# Patient Record
Sex: FEMALE | Race: WHITE | Employment: OTHER | URBAN - METROPOLITAN AREA
[De-identification: names, ages, dates, MRNs, and addresses within clinical notes are randomized per-mention and may not be internally consistent; named-entity substitution may affect disease eponyms.]

---

## 2019-01-01 ENCOUNTER — APPOINTMENT (OUTPATIENT)
Dept: GENERAL RADIOLOGY | Age: 59
DRG: 025 | End: 2019-01-01
Payer: COMMERCIAL

## 2019-01-01 ENCOUNTER — HOSPITAL ENCOUNTER (INPATIENT)
Age: 59
LOS: 11 days | DRG: 025 | End: 2019-04-22
Attending: EMERGENCY MEDICINE | Admitting: SURGERY
Payer: COMMERCIAL

## 2019-01-01 ENCOUNTER — APPOINTMENT (OUTPATIENT)
Dept: GENERAL RADIOLOGY | Age: 59
End: 2019-01-01
Payer: COMMERCIAL

## 2019-01-01 ENCOUNTER — HOSPITAL ENCOUNTER (INPATIENT)
Age: 59
LOS: 2 days | Discharge: PSYCHIATRIC OR SUBSTANCE USE FACILITY | DRG: 084 | End: 2019-04-06
Attending: EMERGENCY MEDICINE | Admitting: SURGERY
Payer: COMMERCIAL

## 2019-01-01 ENCOUNTER — APPOINTMENT (OUTPATIENT)
Dept: CT IMAGING | Age: 59
DRG: 025 | End: 2019-01-01
Payer: COMMERCIAL

## 2019-01-01 ENCOUNTER — HOSPITAL ENCOUNTER (OUTPATIENT)
Age: 59
Discharge: HOME | End: 2019-04-03
Payer: COMMERCIAL

## 2019-01-01 ENCOUNTER — APPOINTMENT (OUTPATIENT)
Dept: CT IMAGING | Age: 59
DRG: 084 | End: 2019-01-01
Payer: COMMERCIAL

## 2019-01-01 ENCOUNTER — HOSPITAL ENCOUNTER (INPATIENT)
Age: 59
LOS: 4 days | Discharge: HOME | DRG: 881 | End: 2019-04-10
Attending: PSYCHIATRY & NEUROLOGY | Admitting: PSYCHIATRY & NEUROLOGY
Payer: COMMERCIAL

## 2019-01-01 ENCOUNTER — APPOINTMENT (OUTPATIENT)
Dept: GENERAL RADIOLOGY | Age: 59
DRG: 084 | End: 2019-01-01
Payer: COMMERCIAL

## 2019-01-01 ENCOUNTER — APPOINTMENT (OUTPATIENT)
Dept: CT IMAGING | Age: 59
End: 2019-01-01
Payer: COMMERCIAL

## 2019-01-01 ENCOUNTER — HOSPITAL ENCOUNTER (EMERGENCY)
Age: 59
Discharge: ANOTHER HOSPITAL | End: 2019-04-04
Attending: EMERGENCY MEDICINE
Payer: COMMERCIAL

## 2019-01-01 ENCOUNTER — ANESTHESIA (OUTPATIENT)
Dept: OPERATING ROOM | Age: 59
DRG: 025 | End: 2019-01-01
Payer: COMMERCIAL

## 2019-01-01 ENCOUNTER — ANESTHESIA EVENT (OUTPATIENT)
Dept: OPERATING ROOM | Age: 59
DRG: 025 | End: 2019-01-01
Payer: COMMERCIAL

## 2019-01-01 VITALS
BODY MASS INDEX: 21.74 KG/M2 | HEIGHT: 66 IN | WEIGHT: 135.3 LBS | RESPIRATION RATE: 50 BRPM | OXYGEN SATURATION: 88 % | HEART RATE: 129 BPM | SYSTOLIC BLOOD PRESSURE: 102 MMHG | DIASTOLIC BLOOD PRESSURE: 64 MMHG | TEMPERATURE: 100 F

## 2019-01-01 VITALS
RESPIRATION RATE: 18 BRPM | BODY MASS INDEX: 21.69 KG/M2 | SYSTOLIC BLOOD PRESSURE: 152 MMHG | TEMPERATURE: 98.6 F | HEIGHT: 66 IN | OXYGEN SATURATION: 100 % | WEIGHT: 135 LBS | HEART RATE: 70 BPM | DIASTOLIC BLOOD PRESSURE: 93 MMHG

## 2019-01-01 VITALS
TEMPERATURE: 97.2 F | OXYGEN SATURATION: 100 % | DIASTOLIC BLOOD PRESSURE: 83 MMHG | SYSTOLIC BLOOD PRESSURE: 121 MMHG | RESPIRATION RATE: 11 BRPM

## 2019-01-01 VITALS
WEIGHT: 135 LBS | SYSTOLIC BLOOD PRESSURE: 139 MMHG | TEMPERATURE: 99.2 F | OXYGEN SATURATION: 96 % | RESPIRATION RATE: 18 BRPM | BODY MASS INDEX: 21.69 KG/M2 | HEIGHT: 66 IN | DIASTOLIC BLOOD PRESSURE: 85 MMHG | HEART RATE: 92 BPM

## 2019-01-01 VITALS
HEART RATE: 81 BPM | SYSTOLIC BLOOD PRESSURE: 96 MMHG | HEIGHT: 66 IN | RESPIRATION RATE: 18 BRPM | OXYGEN SATURATION: 96 % | BODY MASS INDEX: 21.69 KG/M2 | TEMPERATURE: 97.3 F | DIASTOLIC BLOOD PRESSURE: 63 MMHG | WEIGHT: 135 LBS

## 2019-01-01 DIAGNOSIS — T07.XXXA MULTIPLE TRAUMA: Primary | ICD-10-CM

## 2019-01-01 DIAGNOSIS — S06.5XAA SDH (SUBDURAL HEMATOMA): Primary | ICD-10-CM

## 2019-01-01 DIAGNOSIS — S06.5XAA SUBDURAL HEMATOMA: ICD-10-CM

## 2019-01-01 DIAGNOSIS — J96.01 ACUTE RESPIRATORY FAILURE WITH HYPOXIA (HCC): ICD-10-CM

## 2019-01-01 DIAGNOSIS — J96.01 ACUTE RESPIRATORY FAILURE WITH HYPOXIA AND HYPERCAPNIA (HCC): ICD-10-CM

## 2019-01-01 DIAGNOSIS — S06.5XAA SUBDURAL HEMATOMA: Primary | ICD-10-CM

## 2019-01-01 DIAGNOSIS — J96.00 ACUTE RESPIRATORY FAILURE, UNSPECIFIED WHETHER WITH HYPOXIA OR HYPERCAPNIA (HCC): ICD-10-CM

## 2019-01-01 DIAGNOSIS — J96.02 ACUTE RESPIRATORY FAILURE WITH HYPOXIA AND HYPERCAPNIA (HCC): ICD-10-CM

## 2019-01-01 LAB
AADO2: 100.8 MMHG
AADO2: 101.4 MMHG
AADO2: 103.2 MMHG
AADO2: 114.4 MMHG
AADO2: 115.8 MMHG
AADO2: 173.7 MMHG
AADO2: 183.9 MMHG
AADO2: 54.1 MMHG
AADO2: 58.3 MMHG
AADO2: 63 MMHG
AADO2: 65 MMHG
AADO2: 69.5 MMHG
AADO2: 71.1 MMHG
AADO2: 73.9 MMHG
AADO2: 83.9 MMHG
AADO2: 90.3 MMHG
AADO2: 91.2 MMHG
AADO2: 92.2 MMHG
AADO2: 92.7 MMHG
AADO2: 95.7 MMHG
AADO2: 96.1 MMHG
ABO/RH: NORMAL
ACETAMINOPHEN LEVEL: <5 MCG/ML (ref 10–30)
ALBUMIN SERPL-MCNC: 2.3 G/DL (ref 3.5–5.2)
ALBUMIN SERPL-MCNC: 2.4 G/DL (ref 3.5–5.2)
ALBUMIN SERPL-MCNC: 2.4 G/DL (ref 3.5–5.2)
ALBUMIN SERPL-MCNC: 2.5 G/DL (ref 3.5–5.2)
ALBUMIN SERPL-MCNC: 2.6 G/DL (ref 3.5–5.2)
ALBUMIN SERPL-MCNC: 2.7 G/DL (ref 3.5–5.2)
ALBUMIN SERPL-MCNC: 2.8 G/DL (ref 3.5–5.2)
ALBUMIN SERPL-MCNC: 2.9 G/DL (ref 3.5–5.2)
ALBUMIN SERPL-MCNC: 3.8 G/DL (ref 3.5–5.2)
ALBUMIN SERPL-MCNC: 4 G/DL (ref 3.5–5.2)
ALP BLD-CCNC: 106 U/L (ref 35–104)
ALP BLD-CCNC: 115 U/L (ref 35–104)
ALP BLD-CCNC: 117 U/L (ref 35–104)
ALP BLD-CCNC: 126 U/L (ref 35–104)
ALP BLD-CCNC: 126 U/L (ref 35–104)
ALP BLD-CCNC: 128 U/L (ref 35–104)
ALP BLD-CCNC: 128 U/L (ref 35–104)
ALP BLD-CCNC: 132 U/L (ref 35–104)
ALP BLD-CCNC: 132 U/L (ref 35–104)
ALP BLD-CCNC: 133 U/L (ref 35–104)
ALP BLD-CCNC: 134 U/L (ref 35–104)
ALP BLD-CCNC: 70 U/L (ref 35–104)
ALP BLD-CCNC: 73 U/L (ref 35–104)
ALP BLD-CCNC: 79 U/L (ref 35–104)
ALP BLD-CCNC: 84 U/L (ref 35–104)
ALP BLD-CCNC: 87 U/L (ref 35–104)
ALP BLD-CCNC: 90 U/L (ref 35–104)
ALP BLD-CCNC: 96 U/L (ref 35–104)
ALT SERPL-CCNC: 16 U/L (ref 0–32)
ALT SERPL-CCNC: 17 U/L (ref 0–32)
ALT SERPL-CCNC: 18 U/L (ref 0–32)
ALT SERPL-CCNC: 18 U/L (ref 0–32)
ALT SERPL-CCNC: 19 U/L (ref 0–32)
ALT SERPL-CCNC: 20 U/L (ref 0–32)
ALT SERPL-CCNC: 20 U/L (ref 0–32)
ALT SERPL-CCNC: 21 U/L (ref 0–32)
ALT SERPL-CCNC: 23 U/L (ref 0–32)
ALT SERPL-CCNC: 23 U/L (ref 0–32)
ALT SERPL-CCNC: 24 U/L (ref 0–32)
ALT SERPL-CCNC: 25 U/L (ref 0–32)
ALT SERPL-CCNC: 27 U/L (ref 0–32)
ALT SERPL-CCNC: 28 U/L (ref 0–32)
AMOBARBITAL URINE QUANT: <50 NG/ML
AMPHETAMINE SCREEN, URINE: NOT DETECTED
ANGLE (CLOT STRENGTH): 75.8 DEGREE (ref 59–74)
ANION GAP SERPL CALCULATED.3IONS-SCNC: 10 MMOL/L (ref 7–16)
ANION GAP SERPL CALCULATED.3IONS-SCNC: 10 MMOL/L (ref 7–16)
ANION GAP SERPL CALCULATED.3IONS-SCNC: 11 MMOL/L (ref 7–16)
ANION GAP SERPL CALCULATED.3IONS-SCNC: 13 MMOL/L (ref 7–16)
ANION GAP SERPL CALCULATED.3IONS-SCNC: 14 MMOL/L (ref 7–16)
ANION GAP SERPL CALCULATED.3IONS-SCNC: 16 MMOL/L (ref 7–16)
ANION GAP SERPL CALCULATED.3IONS-SCNC: 5 MMOL/L (ref 7–16)
ANION GAP SERPL CALCULATED.3IONS-SCNC: 6 MMOL/L (ref 7–16)
ANION GAP SERPL CALCULATED.3IONS-SCNC: 7 MMOL/L (ref 7–16)
ANION GAP SERPL CALCULATED.3IONS-SCNC: 8 MMOL/L (ref 7–16)
ANION GAP SERPL CALCULATED.3IONS-SCNC: 8 MMOL/L (ref 7–16)
ANION GAP SERPL CALCULATED.3IONS-SCNC: 9 MMOL/L (ref 7–16)
ANISOCYTOSIS: ABNORMAL
ANTIBODY SCREEN: NORMAL
APTT: 28.5 SEC (ref 24.5–35.1)
APTT: 29.1 SEC (ref 24.5–35.1)
APTT: 29.6 SEC (ref 24.5–35.1)
AST SERPL-CCNC: 31 U/L (ref 0–31)
AST SERPL-CCNC: 33 U/L (ref 0–31)
AST SERPL-CCNC: 34 U/L (ref 0–31)
AST SERPL-CCNC: 34 U/L (ref 0–31)
AST SERPL-CCNC: 37 U/L (ref 0–31)
AST SERPL-CCNC: 37 U/L (ref 0–31)
AST SERPL-CCNC: 38 U/L (ref 0–31)
AST SERPL-CCNC: 38 U/L (ref 0–31)
AST SERPL-CCNC: 40 U/L (ref 0–31)
AST SERPL-CCNC: 41 U/L (ref 0–31)
AST SERPL-CCNC: 42 U/L (ref 0–31)
AST SERPL-CCNC: 43 U/L (ref 0–31)
AST SERPL-CCNC: 47 U/L (ref 0–31)
AST SERPL-CCNC: 52 U/L (ref 0–31)
AST SERPL-CCNC: 54 U/L (ref 0–31)
AST SERPL-CCNC: 54 U/L (ref 0–31)
B.E.: -0.2 MMOL/L (ref -3–3)
B.E.: -0.4 MMOL/L (ref -3–3)
B.E.: -0.6 MMOL/L (ref -3–3)
B.E.: -0.9 MMOL/L (ref -3–3)
B.E.: -1 MMOL/L (ref -3–3)
B.E.: -1.1 MMOL/L (ref -3–3)
B.E.: -1.3 MMOL/L (ref -3–3)
B.E.: -2.1 MMOL/L (ref -3–3)
B.E.: -2.4 MMOL/L (ref -3–0)
B.E.: -2.6 MMOL/L (ref -3–3)
B.E.: -3.7 MMOL/L (ref -3–3)
B.E.: 0 MMOL/L (ref -3–3)
B.E.: 0.4 MMOL/L (ref -3–3)
B.E.: 0.6 MMOL/L (ref -3–3)
B.E.: 1.4 MMOL/L (ref -3–3)
B.E.: 1.5 MMOL/L (ref -3–3)
B.E.: 1.6 MMOL/L (ref -3–3)
B.E.: 1.8 MMOL/L (ref -3–3)
B.E.: 1.9 MMOL/L (ref -3–3)
B.E.: 2.6 MMOL/L (ref -3–3)
B.E.: 2.7 MMOL/L (ref -3–0)
B.E.: 3.8 MMOL/L (ref -3–3)
B.E.: 4.4 MMOL/L (ref -3–3)
BACTERIA: ABNORMAL /HPF
BARBITURATE SCREEN URINE: POSITIVE
BASOPHILS ABSOLUTE: 0.03 E9/L (ref 0–0.2)
BASOPHILS ABSOLUTE: 0.03 E9/L (ref 0–0.2)
BASOPHILS ABSOLUTE: 0.04 E9/L (ref 0–0.2)
BASOPHILS ABSOLUTE: 0.05 E9/L (ref 0–0.2)
BASOPHILS ABSOLUTE: 0.06 E9/L (ref 0–0.2)
BASOPHILS ABSOLUTE: 0.06 E9/L (ref 0–0.2)
BASOPHILS ABSOLUTE: 0.08 E9/L (ref 0–0.2)
BASOPHILS RELATIVE PERCENT: 0.3 % (ref 0–2)
BASOPHILS RELATIVE PERCENT: 0.4 % (ref 0–2)
BASOPHILS RELATIVE PERCENT: 0.5 % (ref 0–2)
BASOPHILS RELATIVE PERCENT: 0.6 % (ref 0–2)
BASOPHILS RELATIVE PERCENT: 0.6 % (ref 0–2)
BASOPHILS RELATIVE PERCENT: 0.8 % (ref 0–2)
BENZODIAZEPINE SCREEN, URINE: NOT DETECTED
BILIRUB SERPL-MCNC: 0.3 MG/DL (ref 0–1.2)
BILIRUB SERPL-MCNC: 0.3 MG/DL (ref 0–1.2)
BILIRUB SERPL-MCNC: <0.2 MG/DL (ref 0–1.2)
BILIRUBIN DIRECT: <0.2 MG/DL (ref 0–0.3)
BILIRUBIN URINE: NEGATIVE
BILIRUBIN, INDIRECT: ABNORMAL MG/DL (ref 0–1)
BLOOD BANK DISPENSE STATUS: NORMAL
BLOOD BANK DISPENSE STATUS: NORMAL
BLOOD BANK PRODUCT CODE: NORMAL
BLOOD BANK PRODUCT CODE: NORMAL
BLOOD, URINE: ABNORMAL
BLOOD, URINE: NEGATIVE
BPU ID: NORMAL
BPU ID: NORMAL
BUN BLDV-MCNC: 10 MG/DL (ref 6–20)
BUN BLDV-MCNC: 10 MG/DL (ref 6–20)
BUN BLDV-MCNC: 12 MG/DL (ref 6–20)
BUN BLDV-MCNC: 13 MG/DL (ref 6–20)
BUN BLDV-MCNC: 14 MG/DL (ref 6–20)
BUN BLDV-MCNC: 15 MG/DL (ref 6–20)
BUN BLDV-MCNC: 16 MG/DL (ref 6–20)
BUN BLDV-MCNC: 17 MG/DL (ref 6–20)
BUN BLDV-MCNC: 17 MG/DL (ref 6–20)
BUN BLDV-MCNC: 28 MG/DL (ref 6–20)
BUN BLDV-MCNC: 7 MG/DL (ref 6–20)
BUN BLDV-MCNC: 8 MG/DL (ref 6–20)
BUN BLDV-MCNC: 8 MG/DL (ref 6–20)
BUTALBITAL URINE QUANT: 3454 NG/ML
CALCIUM IONIZED: 1.07 MMOL/L (ref 1.15–1.33)
CALCIUM IONIZED: 1.18 MMOL/L (ref 1.15–1.33)
CALCIUM IONIZED: 1.2 MMOL/L (ref 1.15–1.33)
CALCIUM IONIZED: 1.2 MMOL/L (ref 1.15–1.33)
CALCIUM IONIZED: 1.21 MMOL/L (ref 1.15–1.33)
CALCIUM IONIZED: 1.22 MMOL/L (ref 1.15–1.33)
CALCIUM IONIZED: 1.22 MMOL/L (ref 1.15–1.33)
CALCIUM IONIZED: 1.24 MMOL/L (ref 1.15–1.33)
CALCIUM IONIZED: 1.25 MMOL/L (ref 1.15–1.33)
CALCIUM IONIZED: 1.27 MMOL/L (ref 1.15–1.33)
CALCIUM SERPL-MCNC: 6.7 MG/DL (ref 8.6–10.2)
CALCIUM SERPL-MCNC: 7 MG/DL (ref 8.6–10.2)
CALCIUM SERPL-MCNC: 7.5 MG/DL (ref 8.6–10.2)
CALCIUM SERPL-MCNC: 8.1 MG/DL (ref 8.6–10.2)
CALCIUM SERPL-MCNC: 8.2 MG/DL (ref 8.6–10.2)
CALCIUM SERPL-MCNC: 8.3 MG/DL (ref 8.6–10.2)
CALCIUM SERPL-MCNC: 8.4 MG/DL (ref 8.6–10.2)
CALCIUM SERPL-MCNC: 8.5 MG/DL (ref 8.6–10.2)
CALCIUM SERPL-MCNC: 8.5 MG/DL (ref 8.6–10.2)
CALCIUM SERPL-MCNC: 8.6 MG/DL (ref 8.6–10.2)
CALCIUM SERPL-MCNC: 8.6 MG/DL (ref 8.6–10.2)
CALCIUM SERPL-MCNC: 9.4 MG/DL (ref 8.6–10.2)
CANNABINOID SCREEN URINE: NOT DETECTED
CARDIOPULMONARY BYPASS: NO
CHLORIDE BLD-SCNC: 101 MMOL/L (ref 98–107)
CHLORIDE BLD-SCNC: 103 MMOL/L (ref 98–107)
CHLORIDE BLD-SCNC: 104 MMOL/L (ref 98–107)
CHLORIDE BLD-SCNC: 105 MMOL/L (ref 98–107)
CHLORIDE BLD-SCNC: 105 MMOL/L (ref 98–107)
CHLORIDE BLD-SCNC: 107 MMOL/L (ref 98–107)
CHLORIDE BLD-SCNC: 108 MMOL/L (ref 98–107)
CHLORIDE BLD-SCNC: 108 MMOL/L (ref 98–107)
CHLORIDE BLD-SCNC: 109 MMOL/L (ref 98–107)
CHLORIDE BLD-SCNC: 109 MMOL/L (ref 98–107)
CHLORIDE BLD-SCNC: 110 MMOL/L (ref 98–107)
CHLORIDE BLD-SCNC: 110 MMOL/L (ref 98–107)
CHLORIDE BLD-SCNC: 111 MMOL/L (ref 98–107)
CHLORIDE BLD-SCNC: 113 MMOL/L (ref 98–107)
CHLORIDE BLD-SCNC: 116 MMOL/L (ref 98–107)
CHLORIDE BLD-SCNC: 118 MMOL/L (ref 98–107)
CHLORIDE BLD-SCNC: 92 MMOL/L (ref 98–107)
CHLORIDE BLD-SCNC: 96 MMOL/L (ref 98–107)
CHLORIDE BLD-SCNC: 98 MMOL/L (ref 98–107)
CLARITY: ABNORMAL
CLARITY: CLEAR
CO2: 18 MMOL/L (ref 22–29)
CO2: 22 MMOL/L (ref 22–29)
CO2: 24 MMOL/L (ref 22–29)
CO2: 25 MMOL/L (ref 22–29)
CO2: 25 MMOL/L (ref 22–29)
CO2: 26 MMOL/L (ref 22–29)
CO2: 27 MMOL/L (ref 22–29)
CO2: 28 MMOL/L (ref 22–29)
CO2: 32 MMOL/L (ref 22–29)
COCAINE METABOLITE SCREEN URINE: NOT DETECTED
COHB: 0 % (ref 0–1.5)
COHB: 0.2 % (ref 0–1.5)
COHB: 0.2 % (ref 0–1.5)
COHB: 0.3 % (ref 0–1.5)
COHB: 0.3 % (ref 0–1.5)
COHB: 0.5 % (ref 0–1.5)
COHB: 0.6 % (ref 0–1.5)
COHB: 0.7 % (ref 0–1.5)
COHB: 0.9 % (ref 0–1.5)
COHB: 1 % (ref 0–1.5)
COHB: 1.1 % (ref 0–1.5)
COHB: 1.1 % (ref 0–1.5)
COHB: 1.2 % (ref 0–1.5)
COLOR: YELLOW
CREAT SERPL-MCNC: 0.4 MG/DL (ref 0.5–1)
CREAT SERPL-MCNC: 0.5 MG/DL (ref 0.5–1)
CREAT SERPL-MCNC: 0.6 MG/DL (ref 0.5–1)
CREAT SERPL-MCNC: 1.2 MG/DL (ref 0.5–1)
CRITICAL: ABNORMAL
CULTURE, RESPIRATORY: ABNORMAL
DATE ANALYZED: ABNORMAL
DATE OF COLLECTION: ABNORMAL
DESCRIPTION BLOOD BANK: NORMAL
DESCRIPTION BLOOD BANK: NORMAL
DEVICE: ABNORMAL
DEVICE: ABNORMAL
EKG ATRIAL RATE: 76 BPM
EKG P AXIS: 62 DEGREES
EKG P-R INTERVAL: 138 MS
EKG Q-T INTERVAL: 386 MS
EKG QRS DURATION: 86 MS
EKG QTC CALCULATION (BAZETT): 434 MS
EKG R AXIS: 43 DEGREES
EKG T AXIS: 64 DEGREES
EKG VENTRICULAR RATE: 76 BPM
EOSINOPHILS ABSOLUTE: 0.27 E9/L (ref 0.05–0.5)
EOSINOPHILS ABSOLUTE: 0.45 E9/L (ref 0.05–0.5)
EOSINOPHILS ABSOLUTE: 0.52 E9/L (ref 0.05–0.5)
EOSINOPHILS ABSOLUTE: 0.64 E9/L (ref 0.05–0.5)
EOSINOPHILS ABSOLUTE: 0.65 E9/L (ref 0.05–0.5)
EOSINOPHILS ABSOLUTE: 0.73 E9/L (ref 0.05–0.5)
EOSINOPHILS ABSOLUTE: 0.77 E9/L (ref 0.05–0.5)
EOSINOPHILS ABSOLUTE: 0.85 E9/L (ref 0.05–0.5)
EOSINOPHILS ABSOLUTE: 0.9 E9/L (ref 0.05–0.5)
EOSINOPHILS ABSOLUTE: 0.97 E9/L (ref 0.05–0.5)
EOSINOPHILS RELATIVE PERCENT: 10.2 % (ref 0–6)
EOSINOPHILS RELATIVE PERCENT: 10.5 % (ref 0–6)
EOSINOPHILS RELATIVE PERCENT: 13 % (ref 0–6)
EOSINOPHILS RELATIVE PERCENT: 13.9 % (ref 0–6)
EOSINOPHILS RELATIVE PERCENT: 2.5 % (ref 0–6)
EOSINOPHILS RELATIVE PERCENT: 3.9 % (ref 0–6)
EOSINOPHILS RELATIVE PERCENT: 4.2 % (ref 0–6)
EOSINOPHILS RELATIVE PERCENT: 4.4 % (ref 0–6)
EOSINOPHILS RELATIVE PERCENT: 6.5 % (ref 0–6)
EOSINOPHILS RELATIVE PERCENT: 9.1 % (ref 0–6)
ETHANOL: <10 MG/DL (ref 0–0.08)
FIO2 ARTERIAL: 100
FIO2 ARTERIAL: 40
FIO2: 100 %
FIO2: 100 %
FIO2: 40 %
G-TEG: 12.4 K D/SC (ref 4.5–11)
GFR AFRICAN AMERICAN: 56
GFR AFRICAN AMERICAN: >60
GFR NON-AFRICAN AMERICAN: 46 ML/MIN/1.73
GFR NON-AFRICAN AMERICAN: >60 ML/MIN/1.73
GLUCOSE BLD-MCNC: 100 MG/DL (ref 74–99)
GLUCOSE BLD-MCNC: 101 MG/DL (ref 74–99)
GLUCOSE BLD-MCNC: 102 MG/DL (ref 74–99)
GLUCOSE BLD-MCNC: 104 MG/DL (ref 74–99)
GLUCOSE BLD-MCNC: 105 MG/DL (ref 74–99)
GLUCOSE BLD-MCNC: 106 MG/DL (ref 74–99)
GLUCOSE BLD-MCNC: 107 MG/DL (ref 74–99)
GLUCOSE BLD-MCNC: 107 MG/DL (ref 74–99)
GLUCOSE BLD-MCNC: 115 MG/DL (ref 74–99)
GLUCOSE BLD-MCNC: 115 MG/DL (ref 74–99)
GLUCOSE BLD-MCNC: 118 MG/DL (ref 74–99)
GLUCOSE BLD-MCNC: 118 MG/DL (ref 74–99)
GLUCOSE BLD-MCNC: 119 MG/DL (ref 74–99)
GLUCOSE BLD-MCNC: 130 MG/DL (ref 74–99)
GLUCOSE BLD-MCNC: 154 MG/DL (ref 74–99)
GLUCOSE BLD-MCNC: 85 MG/DL (ref 74–99)
GLUCOSE BLD-MCNC: 90 MG/DL (ref 74–99)
GLUCOSE BLD-MCNC: 95 MG/DL (ref 74–99)
GLUCOSE BLD-MCNC: 99 MG/DL (ref 74–99)
GLUCOSE URINE: NEGATIVE MG/DL
GRAM STAIN ORDERABLE: NORMAL
HCG(URINE) PREGNANCY TEST: NEGATIVE
HCO3 ARTERIAL: 20.7 MMOL/L (ref 22–26)
HCO3 ARTERIAL: 27.1 MMOL/L (ref 22–26)
HCO3: 19.1 MMOL/L (ref 22–26)
HCO3: 19.3 MMOL/L (ref 22–26)
HCO3: 21.1 MMOL/L (ref 22–26)
HCO3: 21.4 MMOL/L (ref 22–26)
HCO3: 21.5 MMOL/L (ref 22–26)
HCO3: 21.8 MMOL/L (ref 22–26)
HCO3: 22.2 MMOL/L (ref 22–26)
HCO3: 22.2 MMOL/L (ref 22–26)
HCO3: 22.5 MMOL/L (ref 22–26)
HCO3: 22.9 MMOL/L (ref 22–26)
HCO3: 23.2 MMOL/L (ref 22–26)
HCO3: 24.4 MMOL/L (ref 22–26)
HCO3: 24.8 MMOL/L (ref 22–26)
HCO3: 24.8 MMOL/L (ref 22–26)
HCO3: 25.7 MMOL/L (ref 22–26)
HCO3: 25.7 MMOL/L (ref 22–26)
HCO3: 25.8 MMOL/L (ref 22–26)
HCO3: 26 MMOL/L (ref 22–26)
HCO3: 26.3 MMOL/L (ref 22–26)
HCO3: 28.3 MMOL/L (ref 22–26)
HCO3: 28.9 MMOL/L (ref 22–26)
HCT VFR BLD CALC: 20.9 % (ref 34–48)
HCT VFR BLD CALC: 22.2 % (ref 34–48)
HCT VFR BLD CALC: 22.5 % (ref 34–48)
HCT VFR BLD CALC: 22.8 % (ref 34–48)
HCT VFR BLD CALC: 23.2 % (ref 34–48)
HCT VFR BLD CALC: 23.8 % (ref 34–48)
HCT VFR BLD CALC: 24.4 % (ref 34–48)
HCT VFR BLD CALC: 24.6 % (ref 34–48)
HCT VFR BLD CALC: 24.6 % (ref 34–48)
HCT VFR BLD CALC: 25.1 % (ref 34–48)
HCT VFR BLD CALC: 25.2 % (ref 34–48)
HCT VFR BLD CALC: 25.3 % (ref 34–48)
HCT VFR BLD CALC: 25.3 % (ref 34–48)
HCT VFR BLD CALC: 25.9 % (ref 34–48)
HCT VFR BLD CALC: 26.6 % (ref 34–48)
HCT VFR BLD CALC: 26.7 % (ref 34–48)
HCT VFR BLD CALC: 27.2 % (ref 34–48)
HCT VFR BLD CALC: 27.8 % (ref 34–48)
HCT VFR BLD CALC: 29.5 % (ref 34–48)
HCT VFR BLD CALC: 31 % (ref 34–48)
HCT VFR BLD CALC: 33.3 % (ref 34–48)
HCT VFR BLD CALC: 37.5 % (ref 34–48)
HCT,ARTERIAL: 17 % (ref 34–48)
HEMOGLOBIN: 10.9 G/DL (ref 11.5–15.5)
HEMOGLOBIN: 12.3 G/DL (ref 11.5–15.5)
HEMOGLOBIN: 6.6 G/DL (ref 11.5–15.5)
HEMOGLOBIN: 6.8 G/DL (ref 11.5–15.5)
HEMOGLOBIN: 6.9 G/DL (ref 11.5–15.5)
HEMOGLOBIN: 7.1 G/DL (ref 11.5–15.5)
HEMOGLOBIN: 7.1 G/DL (ref 11.5–15.5)
HEMOGLOBIN: 7.4 G/DL (ref 11.5–15.5)
HEMOGLOBIN: 7.6 G/DL (ref 11.5–15.5)
HEMOGLOBIN: 7.9 G/DL (ref 11.5–15.5)
HEMOGLOBIN: 8 G/DL (ref 11.5–15.5)
HEMOGLOBIN: 8 G/DL (ref 11.5–15.5)
HEMOGLOBIN: 8.2 G/DL (ref 11.5–15.5)
HEMOGLOBIN: 8.2 G/DL (ref 11.5–15.5)
HEMOGLOBIN: 8.5 G/DL (ref 11.5–15.5)
HEMOGLOBIN: 8.7 G/DL (ref 11.5–15.5)
HEMOGLOBIN: 8.9 G/DL (ref 11.5–15.5)
HEMOGLOBIN: 8.9 G/DL (ref 11.5–15.5)
HEMOGLOBIN: 9.5 G/DL (ref 11.5–15.5)
HEMOGLOBIN: 9.7 G/DL (ref 11.5–15.5)
HGB, ARTERIAL: 5.8 G/DL (ref 11.5–15.5)
HHB: 0.2 % (ref 0–5)
HHB: 0.3 % (ref 0–5)
HHB: 0.6 % (ref 0–5)
HHB: 0.8 % (ref 0–5)
HHB: 0.8 % (ref 0–5)
HHB: 0.9 % (ref 0–5)
HHB: 1 % (ref 0–5)
HHB: 1 % (ref 0–5)
HHB: 1.1 % (ref 0–5)
HHB: 1.1 % (ref 0–5)
HHB: 1.2 % (ref 0–5)
HHB: 1.3 % (ref 0–5)
HHB: 1.4 % (ref 0–5)
HHB: 1.4 % (ref 0–5)
HHB: 1.5 % (ref 0–5)
HHB: 1.6 % (ref 0–5)
HHB: 1.6 % (ref 0–5)
HHB: 1.8 % (ref 0–5)
HHB: 2.4 % (ref 0–5)
IMMATURE GRANULOCYTES #: 0.01 E9/L
IMMATURE GRANULOCYTES #: 0.02 E9/L
IMMATURE GRANULOCYTES #: 0.02 E9/L
IMMATURE GRANULOCYTES #: 0.03 E9/L
IMMATURE GRANULOCYTES #: 0.03 E9/L
IMMATURE GRANULOCYTES #: 0.1 E9/L
IMMATURE GRANULOCYTES #: 0.11 E9/L
IMMATURE GRANULOCYTES #: 0.12 E9/L
IMMATURE GRANULOCYTES #: 0.12 E9/L
IMMATURE GRANULOCYTES #: 0.19 E9/L
IMMATURE GRANULOCYTES %: 0.2 % (ref 0–5)
IMMATURE GRANULOCYTES %: 0.3 % (ref 0–5)
IMMATURE GRANULOCYTES %: 0.4 % (ref 0–5)
IMMATURE GRANULOCYTES %: 0.6 % (ref 0–5)
IMMATURE GRANULOCYTES %: 0.9 % (ref 0–5)
IMMATURE GRANULOCYTES %: 0.9 % (ref 0–5)
IMMATURE GRANULOCYTES %: 1.1 % (ref 0–5)
IMMATURE GRANULOCYTES %: 1.9 % (ref 0–5)
INR BLD: 1.1
INR BLD: 1.2
INR BLD: 1.2
K (CLOTTING TIME): 0.9 MIN (ref 1–3)
KETONES, URINE: ABNORMAL MG/DL
KETONES, URINE: NEGATIVE MG/DL
LAB: ABNORMAL
LACTIC ACID: 0.6 MMOL/L (ref 0.5–2.2)
LACTIC ACID: 0.7 MMOL/L (ref 0.5–2.2)
LACTIC ACID: 0.8 MMOL/L (ref 0.5–2.2)
LACTIC ACID: 0.9 MMOL/L (ref 0.5–2.2)
LACTIC ACID: 1 MMOL/L (ref 0.5–2.2)
LACTIC ACID: 1 MMOL/L (ref 0.5–2.2)
LACTIC ACID: 1.1 MMOL/L (ref 0.5–2.2)
LACTIC ACID: 1.2 MMOL/L (ref 0.5–2.2)
LACTIC ACID: 1.3 MMOL/L (ref 0.5–2.2)
LACTIC ACID: 1.5 MMOL/L (ref 0.5–2.2)
LACTIC ACID: 1.5 MMOL/L (ref 0.5–2.2)
LEUKOCYTE ESTERASE, URINE: ABNORMAL
LEUKOCYTE ESTERASE, URINE: NEGATIVE
LYMPHOCYTES ABSOLUTE: 1.16 E9/L (ref 1.5–4)
LYMPHOCYTES ABSOLUTE: 1.49 E9/L (ref 1.5–4)
LYMPHOCYTES ABSOLUTE: 1.61 E9/L (ref 1.5–4)
LYMPHOCYTES ABSOLUTE: 1.85 E9/L (ref 1.5–4)
LYMPHOCYTES ABSOLUTE: 1.87 E9/L (ref 1.5–4)
LYMPHOCYTES ABSOLUTE: 2.09 E9/L (ref 1.5–4)
LYMPHOCYTES ABSOLUTE: 2.12 E9/L (ref 1.5–4)
LYMPHOCYTES ABSOLUTE: 2.34 E9/L (ref 1.5–4)
LYMPHOCYTES ABSOLUTE: 3.08 E9/L (ref 1.5–4)
LYMPHOCYTES ABSOLUTE: 3.2 E9/L (ref 1.5–4)
LYMPHOCYTES RELATIVE PERCENT: 10.8 % (ref 20–42)
LYMPHOCYTES RELATIVE PERCENT: 14 % (ref 20–42)
LYMPHOCYTES RELATIVE PERCENT: 15.9 % (ref 20–42)
LYMPHOCYTES RELATIVE PERCENT: 16.1 % (ref 20–42)
LYMPHOCYTES RELATIVE PERCENT: 16.4 % (ref 20–42)
LYMPHOCYTES RELATIVE PERCENT: 26.7 % (ref 20–42)
LYMPHOCYTES RELATIVE PERCENT: 31.4 % (ref 20–42)
LYMPHOCYTES RELATIVE PERCENT: 32.7 % (ref 20–42)
LYMPHOCYTES RELATIVE PERCENT: 33 % (ref 20–42)
LYMPHOCYTES RELATIVE PERCENT: 33.3 % (ref 20–42)
Lab: ABNORMAL
MA (MAX AMPLITUDE): 71.3 MM (ref 50–70)
MAGNESIUM: 1.9 MG/DL (ref 1.6–2.6)
MAGNESIUM: 1.9 MG/DL (ref 1.6–2.6)
MAGNESIUM: 2 MG/DL (ref 1.6–2.6)
MAGNESIUM: 2.1 MG/DL (ref 1.6–2.6)
MAGNESIUM: 2.3 MG/DL (ref 1.6–2.6)
MAGNESIUM: 2.5 MG/DL (ref 1.6–2.6)
MAGNESIUM: 2.8 MG/DL (ref 1.6–2.6)
MCH RBC QN AUTO: 28.1 PG (ref 26–35)
MCH RBC QN AUTO: 28.2 PG (ref 26–35)
MCH RBC QN AUTO: 28.3 PG (ref 26–35)
MCH RBC QN AUTO: 28.4 PG (ref 26–35)
MCH RBC QN AUTO: 28.5 PG (ref 26–35)
MCH RBC QN AUTO: 28.5 PG (ref 26–35)
MCH RBC QN AUTO: 28.6 PG (ref 26–35)
MCH RBC QN AUTO: 28.6 PG (ref 26–35)
MCH RBC QN AUTO: 28.7 PG (ref 26–35)
MCH RBC QN AUTO: 28.8 PG (ref 26–35)
MCH RBC QN AUTO: 29 PG (ref 26–35)
MCH RBC QN AUTO: 29 PG (ref 26–35)
MCH RBC QN AUTO: 29.1 PG (ref 26–35)
MCH RBC QN AUTO: 29.2 PG (ref 26–35)
MCH RBC QN AUTO: 29.5 PG (ref 26–35)
MCH RBC QN AUTO: 29.6 PG (ref 26–35)
MCHC RBC AUTO-ENTMCNC: 30.6 % (ref 32–34.5)
MCHC RBC AUTO-ENTMCNC: 30.6 % (ref 32–34.5)
MCHC RBC AUTO-ENTMCNC: 30.7 % (ref 32–34.5)
MCHC RBC AUTO-ENTMCNC: 31.1 % (ref 32–34.5)
MCHC RBC AUTO-ENTMCNC: 31.3 % (ref 32–34.5)
MCHC RBC AUTO-ENTMCNC: 31.5 % (ref 32–34.5)
MCHC RBC AUTO-ENTMCNC: 31.6 % (ref 32–34.5)
MCHC RBC AUTO-ENTMCNC: 31.6 % (ref 32–34.5)
MCHC RBC AUTO-ENTMCNC: 31.7 % (ref 32–34.5)
MCHC RBC AUTO-ENTMCNC: 31.7 % (ref 32–34.5)
MCHC RBC AUTO-ENTMCNC: 31.8 % (ref 32–34.5)
MCHC RBC AUTO-ENTMCNC: 32 % (ref 32–34.5)
MCHC RBC AUTO-ENTMCNC: 32.1 % (ref 32–34.5)
MCHC RBC AUTO-ENTMCNC: 32.2 % (ref 32–34.5)
MCHC RBC AUTO-ENTMCNC: 32.7 % (ref 32–34.5)
MCHC RBC AUTO-ENTMCNC: 32.8 % (ref 32–34.5)
MCHC RBC AUTO-ENTMCNC: 33.3 % (ref 32–34.5)
MCV RBC AUTO: 87.2 FL (ref 80–99.9)
MCV RBC AUTO: 88 FL (ref 80–99.9)
MCV RBC AUTO: 88.4 FL (ref 80–99.9)
MCV RBC AUTO: 88.7 FL (ref 80–99.9)
MCV RBC AUTO: 88.8 FL (ref 80–99.9)
MCV RBC AUTO: 89.3 FL (ref 80–99.9)
MCV RBC AUTO: 89.9 FL (ref 80–99.9)
MCV RBC AUTO: 90 FL (ref 80–99.9)
MCV RBC AUTO: 90.2 FL (ref 80–99.9)
MCV RBC AUTO: 90.2 FL (ref 80–99.9)
MCV RBC AUTO: 90.4 FL (ref 80–99.9)
MCV RBC AUTO: 90.4 FL (ref 80–99.9)
MCV RBC AUTO: 91.1 FL (ref 80–99.9)
MCV RBC AUTO: 91.2 FL (ref 80–99.9)
MCV RBC AUTO: 91.4 FL (ref 80–99.9)
MCV RBC AUTO: 91.8 FL (ref 80–99.9)
MCV RBC AUTO: 91.9 FL (ref 80–99.9)
MCV RBC AUTO: 92.1 FL (ref 80–99.9)
MCV RBC AUTO: 92.1 FL (ref 80–99.9)
MCV RBC AUTO: 92.5 FL (ref 80–99.9)
MCV RBC AUTO: 93.1 FL (ref 80–99.9)
METHADONE SCREEN, URINE: NOT DETECTED
METHB: 0 % (ref 0–1.5)
METHB: 0.2 % (ref 0–1.5)
METHB: 0.3 % (ref 0–1.5)
METHB: 0.4 % (ref 0–1.5)
METHB: 0.5 % (ref 0–1.5)
METHB: 0.6 % (ref 0–1.5)
METHB: 0.7 % (ref 0–1.5)
MODE: AC
MONOCYTES ABSOLUTE: 0.78 E9/L (ref 0.1–0.95)
MONOCYTES ABSOLUTE: 0.96 E9/L (ref 0.1–0.95)
MONOCYTES ABSOLUTE: 1.03 E9/L (ref 0.1–0.95)
MONOCYTES ABSOLUTE: 1.14 E9/L (ref 0.1–0.95)
MONOCYTES ABSOLUTE: 1.22 E9/L (ref 0.1–0.95)
MONOCYTES ABSOLUTE: 1.23 E9/L (ref 0.1–0.95)
MONOCYTES ABSOLUTE: 1.34 E9/L (ref 0.1–0.95)
MONOCYTES ABSOLUTE: 1.35 E9/L (ref 0.1–0.95)
MONOCYTES ABSOLUTE: 1.4 E9/L (ref 0.1–0.95)
MONOCYTES ABSOLUTE: 1.74 E9/L (ref 0.1–0.95)
MONOCYTES RELATIVE PERCENT: 11.5 % (ref 2–12)
MONOCYTES RELATIVE PERCENT: 11.5 % (ref 2–12)
MONOCYTES RELATIVE PERCENT: 12.4 % (ref 2–12)
MONOCYTES RELATIVE PERCENT: 12.5 % (ref 2–12)
MONOCYTES RELATIVE PERCENT: 13.2 % (ref 2–12)
MONOCYTES RELATIVE PERCENT: 13.9 % (ref 2–12)
MONOCYTES RELATIVE PERCENT: 14 % (ref 2–12)
MONOCYTES RELATIVE PERCENT: 15.3 % (ref 2–12)
MONOCYTES RELATIVE PERCENT: 15.9 % (ref 2–12)
MONOCYTES RELATIVE PERCENT: 8.9 % (ref 2–12)
MRSA CULTURE ONLY: NORMAL
MV: 8
MV: 8
NEUTROPHILS ABSOLUTE: 13.62 E9/L (ref 1.8–7.3)
NEUTROPHILS ABSOLUTE: 2.37 E9/L (ref 1.8–7.3)
NEUTROPHILS ABSOLUTE: 2.72 E9/L (ref 1.8–7.3)
NEUTROPHILS ABSOLUTE: 2.94 E9/L (ref 1.8–7.3)
NEUTROPHILS ABSOLUTE: 3.33 E9/L (ref 1.8–7.3)
NEUTROPHILS ABSOLUTE: 4.08 E9/L (ref 1.8–7.3)
NEUTROPHILS ABSOLUTE: 6.09 E9/L (ref 1.8–7.3)
NEUTROPHILS ABSOLUTE: 7.33 E9/L (ref 1.8–7.3)
NEUTROPHILS ABSOLUTE: 7.83 E9/L (ref 1.8–7.3)
NEUTROPHILS ABSOLUTE: 7.87 E9/L (ref 1.8–7.3)
NEUTROPHILS RELATIVE PERCENT: 36.5 % (ref 43–80)
NEUTROPHILS RELATIVE PERCENT: 39.4 % (ref 43–80)
NEUTROPHILS RELATIVE PERCENT: 43.3 % (ref 43–80)
NEUTROPHILS RELATIVE PERCENT: 43.8 % (ref 43–80)
NEUTROPHILS RELATIVE PERCENT: 48 % (ref 43–80)
NEUTROPHILS RELATIVE PERCENT: 61.2 % (ref 43–80)
NEUTROPHILS RELATIVE PERCENT: 66.8 % (ref 43–80)
NEUTROPHILS RELATIVE PERCENT: 69 % (ref 43–80)
NEUTROPHILS RELATIVE PERCENT: 69.8 % (ref 43–80)
NEUTROPHILS RELATIVE PERCENT: 72.7 % (ref 43–80)
NITRITE, URINE: NEGATIVE
NITRITE, URINE: POSITIVE
O2 CONTENT: 16.3 ML/DL
O2 SATURATION: 97.6 % (ref 92–98.5)
O2 SATURATION: 98.2 % (ref 92–98.5)
O2 SATURATION: 98.4 % (ref 92–98.5)
O2 SATURATION: 98.4 % (ref 92–98.5)
O2 SATURATION: 98.5 % (ref 92–98.5)
O2 SATURATION: 98.6 % (ref 92–98.5)
O2 SATURATION: 98.7 % (ref 92–98.5)
O2 SATURATION: 98.8 % (ref 92–98.5)
O2 SATURATION: 98.9 % (ref 92–98.5)
O2 SATURATION: 98.9 % (ref 92–98.5)
O2 SATURATION: 99 % (ref 92–98.5)
O2 SATURATION: 99 % (ref 92–98.5)
O2 SATURATION: 99.1 % (ref 92–98.5)
O2 SATURATION: 99.2 % (ref 92–98.5)
O2 SATURATION: 99.2 % (ref 92–98.5)
O2 SATURATION: 99.4 % (ref 92–98.5)
O2 SATURATION: 99.7 % (ref 92–98.5)
O2 SATURATION: 99.8 % (ref 92–98.5)
O2 SATURATION: 99.9 % (ref 92–98.5)
O2HB: 96.3 % (ref 94–97)
O2HB: 96.5 % (ref 94–97)
O2HB: 97.1 % (ref 94–97)
O2HB: 97.2 % (ref 94–97)
O2HB: 97.3 % (ref 94–97)
O2HB: 97.4 % (ref 94–97)
O2HB: 97.4 % (ref 94–97)
O2HB: 97.7 % (ref 94–97)
O2HB: 97.7 % (ref 94–97)
O2HB: 97.8 % (ref 94–97)
O2HB: 97.9 % (ref 94–97)
O2HB: 97.9 % (ref 94–97)
O2HB: 98 % (ref 94–97)
O2HB: 98.2 % (ref 94–97)
O2HB: 98.2 % (ref 94–97)
O2HB: 98.3 % (ref 94–97)
O2HB: 98.3 % (ref 94–97)
O2HB: 98.5 % (ref 94–97)
O2HB: 99.7 % (ref 94–97)
OPERATOR ID: 101
OPERATOR ID: 1353
OPERATOR ID: 1661
OPERATOR ID: 1768
OPERATOR ID: 1893
OPERATOR ID: 2464
OPERATOR ID: 2577
OPERATOR ID: 3114
OPERATOR ID: 3114
OPERATOR ID: 359
OPERATOR ID: 7490
OPERATOR ID: ABNORMAL
OPIATE SCREEN URINE: NOT DETECTED
ORGANISM: ABNORMAL
OSMOLALITY URINE: 707 MOSM/KG (ref 300–900)
OSMOLALITY: 281 MOSM/KG (ref 285–310)
OVALOCYTES: ABNORMAL
PATIENT TEMP: 37 C
PCO2 ARTERIAL: 26.7 MMHG (ref 35–45)
PCO2 ARTERIAL: 39.8 MMHG (ref 35–45)
PCO2: 24.2 MMHG (ref 35–45)
PCO2: 26.1 MMHG (ref 35–45)
PCO2: 26.7 MMHG (ref 35–45)
PCO2: 26.8 MMHG (ref 35–45)
PCO2: 28 MMHG (ref 35–45)
PCO2: 30.7 MMHG (ref 35–45)
PCO2: 30.7 MMHG (ref 35–45)
PCO2: 31.8 MMHG (ref 35–45)
PCO2: 31.9 MMHG (ref 35–45)
PCO2: 32.3 MMHG (ref 35–45)
PCO2: 32.5 MMHG (ref 35–45)
PCO2: 33.6 MMHG (ref 35–45)
PCO2: 34 MMHG (ref 35–45)
PCO2: 37.7 MMHG (ref 35–45)
PCO2: 38.4 MMHG (ref 35–45)
PCO2: 38.6 MMHG (ref 35–45)
PCO2: 39 MMHG (ref 35–45)
PCO2: 39.3 MMHG (ref 35–45)
PCO2: 40.3 MMHG (ref 35–45)
PCO2: 42.3 MMHG (ref 35–45)
PCO2: 42.8 MMHG (ref 35–45)
PDW BLD-RTO: 12.6 FL (ref 11.5–15)
PDW BLD-RTO: 12.8 FL (ref 11.5–15)
PDW BLD-RTO: 13 FL (ref 11.5–15)
PDW BLD-RTO: 13.1 FL (ref 11.5–15)
PDW BLD-RTO: 13.2 FL (ref 11.5–15)
PDW BLD-RTO: 13.3 FL (ref 11.5–15)
PDW BLD-RTO: 13.5 FL (ref 11.5–15)
PDW BLD-RTO: 13.6 FL (ref 11.5–15)
PDW BLD-RTO: 13.7 FL (ref 11.5–15)
PDW BLD-RTO: 14 FL (ref 11.5–15)
PDW BLD-RTO: 14.5 FL (ref 11.5–15)
PEEP/CPAP: 5 CMH2O
PENTOBARBITAL URINE QUANT: <50 NG/ML
PFO2: 2.8 MMHG/%
PFO2: 2.88 MMHG/%
PFO2: 3.22 MMHG/%
PFO2: 3.25 MMHG/%
PFO2: 3.25 MMHG/%
PFO2: 3.4 MMHG/%
PFO2: 3.45 MMHG/%
PFO2: 3.58 MMHG/%
PFO2: 3.66 MMHG/%
PFO2: 3.72 MMHG/%
PFO2: 3.74 MMHG/%
PFO2: 3.85 MMHG/%
PFO2: 4.19 MMHG/%
PFO2: 4.23 MMHG/%
PFO2: 4.33 MMHG/%
PFO2: 4.39 MMHG/%
PFO2: 4.45 MMHG/%
PFO2: 4.49 MMHG/%
PFO2: 4.64 MMHG/%
PFO2: 4.7 MMHG/%
PFO2: 4.81 MMHG/%
PH BLOOD GAS: 7.42 (ref 7.35–7.45)
PH BLOOD GAS: 7.43 (ref 7.35–7.45)
PH BLOOD GAS: 7.43 (ref 7.35–7.45)
PH BLOOD GAS: 7.44 (ref 7.35–7.45)
PH BLOOD GAS: 7.45 (ref 7.35–7.45)
PH BLOOD GAS: 7.46 (ref 7.35–7.45)
PH BLOOD GAS: 7.48 (ref 7.35–7.45)
PH BLOOD GAS: 7.5 (ref 7.35–7.45)
PH BLOOD GAS: 7.51 (ref 7.35–7.45)
PH BLOOD GAS: 7.52 (ref 7.35–7.45)
PH BLOOD GAS: 7.53 (ref 7.35–7.45)
PH BLOOD GAS: 7.53 (ref 7.35–7.45)
PH UA: 5.5 (ref 5–9)
PH UA: 8 (ref 5–9)
PHENCYCLIDINE SCREEN URINE: NOT DETECTED
PHENOBARBITAL URINE QUANT: <50 NG/ML
PHOSPHORUS: 1.4 MG/DL (ref 2.5–4.5)
PHOSPHORUS: 1.8 MG/DL (ref 2.5–4.5)
PHOSPHORUS: 3.2 MG/DL (ref 2.5–4.5)
PHOSPHORUS: 3.7 MG/DL (ref 2.5–4.5)
PHOSPHORUS: 3.8 MG/DL (ref 2.5–4.5)
PHOSPHORUS: 4.1 MG/DL (ref 2.5–4.5)
PHOSPHORUS: 4.2 MG/DL (ref 2.5–4.5)
PHOSPHORUS: 4.2 MG/DL (ref 2.5–4.5)
PHOSPHORUS: 4.4 MG/DL (ref 2.5–4.5)
PHOSPHORUS: 4.5 MG/DL (ref 2.5–4.5)
PHOSPHORUS: 4.5 MG/DL (ref 2.5–4.5)
PHOSPHORUS: 4.6 MG/DL (ref 2.5–4.5)
PHOSPHORUS: 4.7 MG/DL (ref 2.5–4.5)
PIP: 17 CMH2O
PIP: 18 CMH2O
PLATELET # BLD: 185 E9/L (ref 130–450)
PLATELET # BLD: 205 E9/L (ref 130–450)
PLATELET # BLD: 248 E9/L (ref 130–450)
PLATELET # BLD: 258 E9/L (ref 130–450)
PLATELET # BLD: 270 E9/L (ref 130–450)
PLATELET # BLD: 279 E9/L (ref 130–450)
PLATELET # BLD: 287 E9/L (ref 130–450)
PLATELET # BLD: 316 E9/L (ref 130–450)
PLATELET # BLD: 340 E9/L (ref 130–450)
PLATELET # BLD: 377 E9/L (ref 130–450)
PLATELET # BLD: 382 E9/L (ref 130–450)
PLATELET # BLD: 410 E9/L (ref 130–450)
PLATELET # BLD: 429 E9/L (ref 130–450)
PLATELET # BLD: 455 E9/L (ref 130–450)
PLATELET # BLD: 469 E9/L (ref 130–450)
PLATELET # BLD: 479 E9/L (ref 130–450)
PLATELET # BLD: 481 E9/L (ref 130–450)
PLATELET # BLD: 494 E9/L (ref 130–450)
PLATELET # BLD: 518 E9/L (ref 130–450)
PLATELET # BLD: 523 E9/L (ref 130–450)
PLATELET # BLD: 530 E9/L (ref 130–450)
PMV BLD AUTO: 10.1 FL (ref 7–12)
PMV BLD AUTO: 10.3 FL (ref 7–12)
PMV BLD AUTO: 10.4 FL (ref 7–12)
PMV BLD AUTO: 10.5 FL (ref 7–12)
PMV BLD AUTO: 10.6 FL (ref 7–12)
PMV BLD AUTO: 10.7 FL (ref 7–12)
PMV BLD AUTO: 10.8 FL (ref 7–12)
PMV BLD AUTO: 10.8 FL (ref 7–12)
PMV BLD AUTO: 9.3 FL (ref 7–12)
PMV BLD AUTO: 9.4 FL (ref 7–12)
PMV BLD AUTO: 9.5 FL (ref 7–12)
PMV BLD AUTO: 9.5 FL (ref 7–12)
PMV BLD AUTO: 9.6 FL (ref 7–12)
PMV BLD AUTO: 9.7 FL (ref 7–12)
PMV BLD AUTO: 9.7 FL (ref 7–12)
PMV BLD AUTO: 9.8 FL (ref 7–12)
PMV BLD AUTO: 9.9 FL (ref 7–12)
PO2 ARTERIAL: 104.4 MMHG (ref 80–100)
PO2 ARTERIAL: 345.5 MMHG (ref 80–100)
PO2: 112.2 MMHG (ref 60–100)
PO2: 115.2 MMHG (ref 60–100)
PO2: 128.6 MMHG (ref 60–100)
PO2: 129.9 MMHG (ref 60–100)
PO2: 130 MMHG (ref 60–100)
PO2: 136.2 MMHG (ref 60–100)
PO2: 137.8 MMHG (ref 60–100)
PO2: 143.1 MMHG (ref 60–100)
PO2: 146.5 MMHG (ref 60–100)
PO2: 148.7 MMHG (ref 60–100)
PO2: 149.5 MMHG (ref 60–100)
PO2: 154.1 MMHG (ref 60–100)
PO2: 167.5 MMHG (ref 60–100)
PO2: 169.1 MMHG (ref 60–100)
PO2: 173.1 MMHG (ref 60–100)
PO2: 175.5 MMHG (ref 60–100)
PO2: 177.9 MMHG (ref 60–100)
PO2: 179.5 MMHG (ref 60–100)
PO2: 185.8 MMHG (ref 60–100)
PO2: 470.1 MMHG (ref 60–100)
PO2: 480.7 MMHG (ref 60–100)
POIKILOCYTES: ABNORMAL
POSITIVE END EXP PRESS: 5 CMH2O
POTASSIUM REFLEX MAGNESIUM: 4.2 MMOL/L (ref 3.5–5)
POTASSIUM SERPL-SCNC: 2.9 MMOL/L (ref 3.5–5)
POTASSIUM SERPL-SCNC: 3.6 MMOL/L (ref 3.5–5.5)
POTASSIUM SERPL-SCNC: 3.64 MMOL/L (ref 3.3–5.1)
POTASSIUM SERPL-SCNC: 3.7 MMOL/L (ref 3.5–5)
POTASSIUM SERPL-SCNC: 3.7 MMOL/L (ref 3.5–5)
POTASSIUM SERPL-SCNC: 3.8 MMOL/L (ref 3.5–5)
POTASSIUM SERPL-SCNC: 3.9 MMOL/L (ref 3.5–5)
POTASSIUM SERPL-SCNC: 4 MMOL/L (ref 3.5–5)
POTASSIUM SERPL-SCNC: 4 MMOL/L (ref 3.5–5)
POTASSIUM SERPL-SCNC: 4.2 MMOL/L (ref 3.5–5)
PROPOXYPHENE SCREEN: NOT DETECTED
PROTEIN UA: NEGATIVE MG/DL
PROTHROMBIN TIME: 12.5 SEC (ref 9.3–12.4)
PROTHROMBIN TIME: 13.5 SEC (ref 9.3–12.4)
PROTHROMBIN TIME: 14 SEC (ref 9.3–12.4)
PS: 5 CMH20
R (REACTION TIME): 4.2 MIN (ref 5–10)
RBC # BLD: 2.26 E12/L (ref 3.5–5.5)
RBC # BLD: 2.41 E12/L (ref 3.5–5.5)
RBC # BLD: 2.45 E12/L (ref 3.5–5.5)
RBC # BLD: 2.45 E12/L (ref 3.5–5.5)
RBC # BLD: 2.52 E12/L (ref 3.5–5.5)
RBC # BLD: 2.61 E12/L (ref 3.5–5.5)
RBC # BLD: 2.7 E12/L (ref 3.5–5.5)
RBC # BLD: 2.77 E12/L (ref 3.5–5.5)
RBC # BLD: 2.79 E12/L (ref 3.5–5.5)
RBC # BLD: 2.8 E12/L (ref 3.5–5.5)
RBC # BLD: 2.82 E12/L (ref 3.5–5.5)
RBC # BLD: 2.84 E12/L (ref 3.5–5.5)
RBC # BLD: 2.88 E12/L (ref 3.5–5.5)
RBC # BLD: 2.95 E12/L (ref 3.5–5.5)
RBC # BLD: 2.96 E12/L (ref 3.5–5.5)
RBC # BLD: 3.05 E12/L (ref 3.5–5.5)
RBC # BLD: 3.09 E12/L (ref 3.5–5.5)
RBC # BLD: 3.21 E12/L (ref 3.5–5.5)
RBC # BLD: 3.39 E12/L (ref 3.5–5.5)
RBC # BLD: 3.73 E12/L (ref 3.5–5.5)
RBC # BLD: 4.24 E12/L (ref 3.5–5.5)
RBC UA: ABNORMAL /HPF (ref 0–2)
RESPIRATORY RATE: 12 B/MIN
RI(T): 0.29
RI(T): 0.32
RI(T): 0.36
RI(T): 0.36
RI(T): 0.38
RI(T): 0.39
RI(T): 0.42
RI(T): 0.44
RI(T): 0.57
RI(T): 0.59
RI(T): 0.61
RI(T): 0.64
RI(T): 0.67
RI(T): 0.68
RI(T): 0.71
RI(T): 0.74
RI(T): 0.78
RI(T): 0.8
RI(T): 1.01
RI(T): 1.02
RI(T): 39 %
RR MECHANICAL: 12 B/MIN
RR MECHANICAL: 14 B/MIN
RR MECHANICAL: 14 B/MIN
SALICYLATE, SERUM: <0.3 MG/DL (ref 0–30)
SECOBARBITAL URINE QUANT: <50 NG/ML
SMEAR, RESPIRATORY: ABNORMAL
SMEAR, RESPIRATORY: ABNORMAL
SODIUM BLD-SCNC: 132 MMOL/L (ref 132–146)
SODIUM BLD-SCNC: 133 MMOL/L (ref 132–146)
SODIUM BLD-SCNC: 134 MMOL/L (ref 132–146)
SODIUM BLD-SCNC: 134 MMOL/L (ref 132–146)
SODIUM BLD-SCNC: 135 MMOL/L (ref 132–146)
SODIUM BLD-SCNC: 136 MMOL/L (ref 132–146)
SODIUM BLD-SCNC: 137 MMOL/L (ref 132–146)
SODIUM BLD-SCNC: 138 MMOL/L (ref 132–146)
SODIUM BLD-SCNC: 139 MMOL/L (ref 132–146)
SODIUM BLD-SCNC: 140 MMOL/L (ref 132–146)
SODIUM BLD-SCNC: 140 MMOL/L (ref 132–146)
SODIUM BLD-SCNC: 141 MMOL/L (ref 132–146)
SODIUM BLD-SCNC: 141 MMOL/L (ref 132–146)
SODIUM BLD-SCNC: 143 MMOL/L (ref 132–146)
SODIUM BLD-SCNC: 144 MMOL/L (ref 132–146)
SODIUM BLD-SCNC: 145 MMOL/L (ref 132–146)
SODIUM BLD-SCNC: 146 MMOL/L (ref 132–146)
SODIUM BLD-SCNC: 147 MMOL/L (ref 132–146)
SODIUM BLD-SCNC: 149 MMOL/L (ref 132–146)
SODIUM URINE: 225 MMOL/L
SOURCE, BLOOD GAS: ABNORMAL
SPECIFIC GRAVITY UA: 1.01 (ref 1–1.03)
SPECIFIC GRAVITY UA: 1.02 (ref 1–1.03)
THB: 10.5 G/DL (ref 11.5–16.5)
THB: 10.7 G/DL (ref 11.5–16.5)
THB: 10.8 G/DL (ref 11.5–16.5)
THB: 11.5 G/DL (ref 11.5–16.5)
THB: 7.3 G/DL (ref 11.5–16.5)
THB: 7.3 G/DL (ref 11.5–16.5)
THB: 7.4 G/DL (ref 11.5–16.5)
THB: 7.7 G/DL (ref 11.5–16.5)
THB: 7.8 G/DL (ref 11.5–16.5)
THB: 7.9 G/DL (ref 11.5–16.5)
THB: 7.9 G/DL (ref 11.5–16.5)
THB: 8.1 G/DL (ref 11.5–16.5)
THB: 8.2 G/DL (ref 11.5–16.5)
THB: 8.5 G/DL (ref 11.5–16.5)
THB: 8.7 G/DL (ref 11.5–16.5)
THB: 8.9 G/DL (ref 11.5–16.5)
THB: 9 G/DL (ref 11.5–16.5)
THB: 9.1 G/DL (ref 11.5–16.5)
THB: 9.4 G/DL (ref 11.5–16.5)
THB: 9.7 G/DL (ref 11.5–16.5)
THB: 9.9 G/DL (ref 11.5–16.5)
TIDAL VOLUME: 450 ML
TIME ANALYZED: 1012
TIME ANALYZED: 102
TIME ANALYZED: 1154
TIME ANALYZED: 1238
TIME ANALYZED: 1653
TIME ANALYZED: 1759
TIME ANALYZED: 2011
TIME ANALYZED: 28
TIME ANALYZED: 417
TIME ANALYZED: 418
TIME ANALYZED: 441
TIME ANALYZED: 445
TIME ANALYZED: 450
TIME ANALYZED: 49
TIME ANALYZED: 520
TIME ANALYZED: 523
TIME ANALYZED: 524
TIME ANALYZED: 538
TIME ANALYZED: 548
TIME ANALYZED: 601
TIME ANALYZED: 841
TOTAL PROTEIN: 5 G/DL (ref 6.4–8.3)
TOTAL PROTEIN: 5.1 G/DL (ref 6.4–8.3)
TOTAL PROTEIN: 5.1 G/DL (ref 6.4–8.3)
TOTAL PROTEIN: 5.5 G/DL (ref 6.4–8.3)
TOTAL PROTEIN: 5.6 G/DL (ref 6.4–8.3)
TOTAL PROTEIN: 5.7 G/DL (ref 6.4–8.3)
TOTAL PROTEIN: 5.8 G/DL (ref 6.4–8.3)
TOTAL PROTEIN: 5.9 G/DL (ref 6.4–8.3)
TOTAL PROTEIN: 6.1 G/DL (ref 6.4–8.3)
TOTAL PROTEIN: 6.3 G/DL (ref 6.4–8.3)
TOTAL PROTEIN: 7.8 G/DL (ref 6.4–8.3)
TRICYCLIC ANTIDEPRESSANTS SCREEN SERUM: NEGATIVE NG/ML
TRIGL SERPL-MCNC: 59 MG/DL (ref 0–149)
TROPONIN: <0.01 NG/ML (ref 0–0.03)
UROBILINOGEN, URINE: 0.2 E.U./DL
VT MECHANICAL: 400 ML
VT MECHANICAL: 450 ML
WBC # BLD: 10 E9/L (ref 4.5–11.5)
WBC # BLD: 10.3 E9/L (ref 4.5–11.5)
WBC # BLD: 10.6 E9/L (ref 4.5–11.5)
WBC # BLD: 10.8 E9/L (ref 4.5–11.5)
WBC # BLD: 10.9 E9/L (ref 4.5–11.5)
WBC # BLD: 11 E9/L (ref 4.5–11.5)
WBC # BLD: 11.3 E9/L (ref 4.5–11.5)
WBC # BLD: 11.8 E9/L (ref 4.5–11.5)
WBC # BLD: 13.2 E9/L (ref 4.5–11.5)
WBC # BLD: 19.5 E9/L (ref 4.5–11.5)
WBC # BLD: 6.3 E9/L (ref 4.5–11.5)
WBC # BLD: 6.5 E9/L (ref 4.5–11.5)
WBC # BLD: 6.9 E9/L (ref 4.5–11.5)
WBC # BLD: 7.5 E9/L (ref 4.5–11.5)
WBC # BLD: 7.9 E9/L (ref 4.5–11.5)
WBC # BLD: 8.8 E9/L (ref 4.5–11.5)
WBC # BLD: 8.9 E9/L (ref 4.5–11.5)
WBC # BLD: 9.3 E9/L (ref 4.5–11.5)
WBC # BLD: 9.5 E9/L (ref 4.5–11.5)
WBC # BLD: 9.8 E9/L (ref 4.5–11.5)
WBC # BLD: 9.8 E9/L (ref 4.5–11.5)
WBC UA: >20 /HPF (ref 0–5)

## 2019-01-01 PROCEDURE — 2500000003 HC RX 250 WO HCPCS: Performed by: SURGERY

## 2019-01-01 PROCEDURE — 87206 SMEAR FLUORESCENT/ACID STAI: CPT

## 2019-01-01 PROCEDURE — 6370000000 HC RX 637 (ALT 250 FOR IP): Performed by: STUDENT IN AN ORGANIZED HEALTH CARE EDUCATION/TRAINING PROGRAM

## 2019-01-01 PROCEDURE — 5A1955Z RESPIRATORY VENTILATION, GREATER THAN 96 CONSECUTIVE HOURS: ICD-10-PCS | Performed by: EMERGENCY MEDICINE

## 2019-01-01 PROCEDURE — 85576 BLOOD PLATELET AGGREGATION: CPT

## 2019-01-01 PROCEDURE — 99238 HOSP IP/OBS DSCHRG MGMT 30/<: CPT | Performed by: SURGERY

## 2019-01-01 PROCEDURE — 6360000002 HC RX W HCPCS: Performed by: NURSE PRACTITIONER

## 2019-01-01 PROCEDURE — 2580000003 HC RX 258: Performed by: SURGERY

## 2019-01-01 PROCEDURE — 87205 SMEAR GRAM STAIN: CPT

## 2019-01-01 PROCEDURE — 87077 CULTURE AEROBIC IDENTIFY: CPT

## 2019-01-01 PROCEDURE — 80053 COMPREHEN METABOLIC PANEL: CPT

## 2019-01-01 PROCEDURE — 81003 URINALYSIS AUTO W/O SCOPE: CPT

## 2019-01-01 PROCEDURE — 6360000002 HC RX W HCPCS: Performed by: NEUROLOGICAL SURGERY

## 2019-01-01 PROCEDURE — 2500000003 HC RX 250 WO HCPCS: Performed by: NURSE PRACTITIONER

## 2019-01-01 PROCEDURE — 84100 ASSAY OF PHOSPHORUS: CPT

## 2019-01-01 PROCEDURE — 99221 1ST HOSP IP/OBS SF/LOW 40: CPT | Performed by: NURSE PRACTITIONER

## 2019-01-01 PROCEDURE — 6370000000 HC RX 637 (ALT 250 FOR IP): Performed by: NURSE PRACTITIONER

## 2019-01-01 PROCEDURE — 71045 X-RAY EXAM CHEST 1 VIEW: CPT

## 2019-01-01 PROCEDURE — 2000000000 HC ICU R&B

## 2019-01-01 PROCEDURE — 85027 COMPLETE CBC AUTOMATED: CPT

## 2019-01-01 PROCEDURE — 85730 THROMBOPLASTIN TIME PARTIAL: CPT

## 2019-01-01 PROCEDURE — 99291 CRITICAL CARE FIRST HOUR: CPT | Performed by: NURSE PRACTITIONER

## 2019-01-01 PROCEDURE — 86900 BLOOD TYPING SEROLOGIC ABO: CPT

## 2019-01-01 PROCEDURE — 2580000003 HC RX 258: Performed by: CLINICAL NURSE SPECIALIST

## 2019-01-01 PROCEDURE — 82805 BLOOD GASES W/O2 SATURATION: CPT

## 2019-01-01 PROCEDURE — 6370000000 HC RX 637 (ALT 250 FOR IP): Performed by: SURGERY

## 2019-01-01 PROCEDURE — 94640 AIRWAY INHALATION TREATMENT: CPT

## 2019-01-01 PROCEDURE — 36415 COLL VENOUS BLD VENIPUNCTURE: CPT

## 2019-01-01 PROCEDURE — 86923 COMPATIBILITY TEST ELECTRIC: CPT

## 2019-01-01 PROCEDURE — 83930 ASSAY OF BLOOD OSMOLALITY: CPT

## 2019-01-01 PROCEDURE — 2709999900 HC NON-CHARGEABLE SUPPLY

## 2019-01-01 PROCEDURE — 85014 HEMATOCRIT: CPT

## 2019-01-01 PROCEDURE — 94002 VENT MGMT INPAT INIT DAY: CPT

## 2019-01-01 PROCEDURE — 6370000000 HC RX 637 (ALT 250 FOR IP): Performed by: PSYCHIATRY & NEUROLOGY

## 2019-01-01 PROCEDURE — 72170 X-RAY EXAM OF PELVIS: CPT

## 2019-01-01 PROCEDURE — 2580000003 HC RX 258: Performed by: STUDENT IN AN ORGANIZED HEALTH CARE EDUCATION/TRAINING PROGRAM

## 2019-01-01 PROCEDURE — 99291 CRITICAL CARE FIRST HOUR: CPT | Performed by: SURGERY

## 2019-01-01 PROCEDURE — 96374 THER/PROPH/DIAG INJ IV PUSH: CPT

## 2019-01-01 PROCEDURE — 99285 EMERGENCY DEPT VISIT HI MDM: CPT

## 2019-01-01 PROCEDURE — 82330 ASSAY OF CALCIUM: CPT

## 2019-01-01 PROCEDURE — 70450 CT HEAD/BRAIN W/O DYE: CPT

## 2019-01-01 PROCEDURE — 3600000015 HC SURGERY LEVEL 5 ADDTL 15MIN: Performed by: NEUROLOGICAL SURGERY

## 2019-01-01 PROCEDURE — 6360000002 HC RX W HCPCS: Performed by: EMERGENCY MEDICINE

## 2019-01-01 PROCEDURE — 85025 COMPLETE CBC W/AUTO DIFF WBC: CPT

## 2019-01-01 PROCEDURE — 2500000003 HC RX 250 WO HCPCS

## 2019-01-01 PROCEDURE — 86850 RBC ANTIBODY SCREEN: CPT

## 2019-01-01 PROCEDURE — A0425 GROUND MILEAGE: HCPCS

## 2019-01-01 PROCEDURE — 81025 URINE PREGNANCY TEST: CPT

## 2019-01-01 PROCEDURE — 99231 SBSQ HOSP IP/OBS SF/LOW 25: CPT | Performed by: PSYCHIATRY & NEUROLOGY

## 2019-01-01 PROCEDURE — G0480 DRUG TEST DEF 1-7 CLASSES: HCPCS

## 2019-01-01 PROCEDURE — 83735 ASSAY OF MAGNESIUM: CPT

## 2019-01-01 PROCEDURE — 99232 SBSQ HOSP IP/OBS MODERATE 35: CPT | Performed by: SURGERY

## 2019-01-01 PROCEDURE — 36430 TRANSFUSION BLD/BLD COMPNT: CPT

## 2019-01-01 PROCEDURE — 36620 INSERTION CATHETER ARTERY: CPT

## 2019-01-01 PROCEDURE — 2060000000 HC ICU INTERMEDIATE R&B

## 2019-01-01 PROCEDURE — 37799 UNLISTED PX VASCULAR SURGERY: CPT

## 2019-01-01 PROCEDURE — 6360000002 HC RX W HCPCS: Performed by: CLINICAL NURSE SPECIALIST

## 2019-01-01 PROCEDURE — 83605 ASSAY OF LACTIC ACID: CPT

## 2019-01-01 PROCEDURE — 7100000000 HC PACU RECOVERY - FIRST 15 MIN: Performed by: NEUROLOGICAL SURGERY

## 2019-01-01 PROCEDURE — 2580000003 HC RX 258: Performed by: NEUROLOGICAL SURGERY

## 2019-01-01 PROCEDURE — 87186 SC STD MICRODIL/AGAR DIL: CPT

## 2019-01-01 PROCEDURE — 3600000014 HC SURGERY LEVEL 4 ADDTL 15MIN

## 2019-01-01 PROCEDURE — 94003 VENT MGMT INPAT SUBQ DAY: CPT

## 2019-01-01 PROCEDURE — 72125 CT NECK SPINE W/O DYE: CPT

## 2019-01-01 PROCEDURE — 2580000003 HC RX 258: Performed by: NURSE PRACTITIONER

## 2019-01-01 PROCEDURE — 82803 BLOOD GASES ANY COMBINATION: CPT

## 2019-01-01 PROCEDURE — 1240000000 HC EMOTIONAL WELLNESS R&B

## 2019-01-01 PROCEDURE — 6360000002 HC RX W HCPCS: Performed by: STUDENT IN AN ORGANIZED HEALTH CARE EDUCATION/TRAINING PROGRAM

## 2019-01-01 PROCEDURE — 73030 X-RAY EXAM OF SHOULDER: CPT

## 2019-01-01 PROCEDURE — 80048 BASIC METABOLIC PNL TOTAL CA: CPT

## 2019-01-01 PROCEDURE — 99232 SBSQ HOSP IP/OBS MODERATE 35: CPT | Performed by: PHYSICIAN ASSISTANT

## 2019-01-01 PROCEDURE — 93005 ELECTROCARDIOGRAM TRACING: CPT | Performed by: EMERGENCY MEDICINE

## 2019-01-01 PROCEDURE — 73060 X-RAY EXAM OF HUMERUS: CPT

## 2019-01-01 PROCEDURE — 96375 TX/PRO/DX INJ NEW DRUG ADDON: CPT

## 2019-01-01 PROCEDURE — 94664 DEMO&/EVAL PT USE INHALER: CPT

## 2019-01-01 PROCEDURE — 86901 BLOOD TYPING SEROLOGIC RH(D): CPT

## 2019-01-01 PROCEDURE — 85384 FIBRINOGEN ACTIVITY: CPT

## 2019-01-01 PROCEDURE — 84484 ASSAY OF TROPONIN QUANT: CPT

## 2019-01-01 PROCEDURE — 90715 TDAP VACCINE 7 YRS/> IM: CPT | Performed by: EMERGENCY MEDICINE

## 2019-01-01 PROCEDURE — 99232 SBSQ HOSP IP/OBS MODERATE 35: CPT | Performed by: NURSE PRACTITIONER

## 2019-01-01 PROCEDURE — 97530 THERAPEUTIC ACTIVITIES: CPT

## 2019-01-01 PROCEDURE — 2580000003 HC RX 258: Performed by: EMERGENCY MEDICINE

## 2019-01-01 PROCEDURE — 3600000004 HC SURGERY LEVEL 4 BASE

## 2019-01-01 PROCEDURE — 84295 ASSAY OF SERUM SODIUM: CPT

## 2019-01-01 PROCEDURE — 2500000003 HC RX 250 WO HCPCS: Performed by: NEUROLOGICAL SURGERY

## 2019-01-01 PROCEDURE — 7100000001 HC PACU RECOVERY - ADDTL 15 MIN: Performed by: NEUROLOGICAL SURGERY

## 2019-01-01 PROCEDURE — 74018 RADEX ABDOMEN 1 VIEW: CPT

## 2019-01-01 PROCEDURE — 85610 PROTHROMBIN TIME: CPT

## 2019-01-01 PROCEDURE — 97165 OT EVAL LOW COMPLEX 30 MIN: CPT

## 2019-01-01 PROCEDURE — 02HV33Z INSERTION OF INFUSION DEVICE INTO SUPERIOR VENA CAVA, PERCUTANEOUS APPROACH: ICD-10-PCS | Performed by: SURGERY

## 2019-01-01 PROCEDURE — 80076 HEPATIC FUNCTION PANEL: CPT

## 2019-01-01 PROCEDURE — 85347 COAGULATION TIME ACTIVATED: CPT

## 2019-01-01 PROCEDURE — 87070 CULTURE OTHR SPECIMN AEROBIC: CPT

## 2019-01-01 PROCEDURE — 2500000003 HC RX 250 WO HCPCS: Performed by: REGISTERED NURSE

## 2019-01-01 PROCEDURE — 84478 ASSAY OF TRIGLYCERIDES: CPT

## 2019-01-01 PROCEDURE — 2709999900 HC NON-CHARGEABLE SUPPLY: Performed by: NEUROLOGICAL SURGERY

## 2019-01-01 PROCEDURE — 1200000000 HC SEMI PRIVATE

## 2019-01-01 PROCEDURE — 36556 INSERT NON-TUNNEL CV CATH: CPT

## 2019-01-01 PROCEDURE — 99232 SBSQ HOSP IP/OBS MODERATE 35: CPT | Performed by: CLINICAL NURSE SPECIALIST

## 2019-01-01 PROCEDURE — 81001 URINALYSIS AUTO W/SCOPE: CPT

## 2019-01-01 PROCEDURE — P9016 RBC LEUKOCYTES REDUCED: HCPCS

## 2019-01-01 PROCEDURE — 99238 HOSP IP/OBS DSCHRG MGMT 30/<: CPT | Performed by: NURSE PRACTITIONER

## 2019-01-01 PROCEDURE — C1713 ANCHOR/SCREW BN/BN,TIS/BN: HCPCS | Performed by: NEUROLOGICAL SURGERY

## 2019-01-01 PROCEDURE — 6370000000 HC RX 637 (ALT 250 FOR IP): Performed by: NEUROLOGICAL SURGERY

## 2019-01-01 PROCEDURE — A0426 ALS 1: HCPCS

## 2019-01-01 PROCEDURE — 2500000003 HC RX 250 WO HCPCS: Performed by: EMERGENCY MEDICINE

## 2019-01-01 PROCEDURE — 6360000002 HC RX W HCPCS

## 2019-01-01 PROCEDURE — 31500 INSERT EMERGENCY AIRWAY: CPT

## 2019-01-01 PROCEDURE — 3700000001 HC ADD 15 MINUTES (ANESTHESIA): Performed by: NEUROLOGICAL SURGERY

## 2019-01-01 PROCEDURE — 87081 CULTURE SCREEN ONLY: CPT

## 2019-01-01 PROCEDURE — 6360000002 HC RX W HCPCS: Performed by: REGISTERED NURSE

## 2019-01-01 PROCEDURE — 6360000002 HC RX W HCPCS: Performed by: NURSE ANESTHETIST, CERTIFIED REGISTERED

## 2019-01-01 PROCEDURE — 99223 1ST HOSP IP/OBS HIGH 75: CPT | Performed by: SURGERY

## 2019-01-01 PROCEDURE — 85018 HEMOGLOBIN: CPT

## 2019-01-01 PROCEDURE — 88304 TISSUE EXAM BY PATHOLOGIST: CPT

## 2019-01-01 PROCEDURE — 97162 PT EVAL MOD COMPLEX 30 MIN: CPT

## 2019-01-01 PROCEDURE — APPSS30 APP SPLIT SHARED TIME 16-30 MINUTES: Performed by: NURSE PRACTITIONER

## 2019-01-01 PROCEDURE — 83935 ASSAY OF URINE OSMOLALITY: CPT

## 2019-01-01 PROCEDURE — 3600000005 HC SURGERY LEVEL 5 BASE: Performed by: NEUROLOGICAL SURGERY

## 2019-01-01 PROCEDURE — 2700000000 HC OXYGEN THERAPY PER DAY

## 2019-01-01 PROCEDURE — 3700000000 HC ANESTHESIA ATTENDED CARE: Performed by: NEUROLOGICAL SURGERY

## 2019-01-01 PROCEDURE — 73552 X-RAY EXAM OF FEMUR 2/>: CPT

## 2019-01-01 PROCEDURE — 0BJ08ZZ INSPECTION OF TRACHEOBRONCHIAL TREE, VIA NATURAL OR ARTIFICIAL OPENING ENDOSCOPIC: ICD-10-PCS | Performed by: STUDENT IN AN ORGANIZED HEALTH CARE EDUCATION/TRAINING PROGRAM

## 2019-01-01 PROCEDURE — 2580000003 HC RX 258: Performed by: REGISTERED NURSE

## 2019-01-01 PROCEDURE — 84300 ASSAY OF URINE SODIUM: CPT

## 2019-01-01 PROCEDURE — APPSS45 APP SPLIT SHARED TIME 31-45 MINUTES: Performed by: NURSE PRACTITIONER

## 2019-01-01 PROCEDURE — 0BH18EZ INSERTION OF ENDOTRACHEAL AIRWAY INTO TRACHEA, VIA NATURAL OR ARTIFICIAL OPENING ENDOSCOPIC: ICD-10-PCS | Performed by: EMERGENCY MEDICINE

## 2019-01-01 PROCEDURE — 6370000000 HC RX 637 (ALT 250 FOR IP)

## 2019-01-01 PROCEDURE — 70486 CT MAXILLOFACIAL W/O DYE: CPT

## 2019-01-01 PROCEDURE — 2720000010 HC SURG SUPPLY STERILE: Performed by: NEUROLOGICAL SURGERY

## 2019-01-01 PROCEDURE — P9040 RBC LEUKOREDUCED IRRADIATED: HCPCS

## 2019-01-01 PROCEDURE — 84132 ASSAY OF SERUM POTASSIUM: CPT

## 2019-01-01 PROCEDURE — 99223 1ST HOSP IP/OBS HIGH 75: CPT | Performed by: PHYSICIAN ASSISTANT

## 2019-01-01 PROCEDURE — 00C40ZZ EXTIRPATION OF MATTER FROM INTRACRANIAL SUBDURAL SPACE, OPEN APPROACH: ICD-10-PCS | Performed by: NEUROLOGICAL SURGERY

## 2019-01-01 PROCEDURE — 90471 IMMUNIZATION ADMIN: CPT | Performed by: EMERGENCY MEDICINE

## 2019-01-01 PROCEDURE — 80307 DRUG TEST PRSMV CHEM ANLYZR: CPT

## 2019-01-01 DEVICE — SCREW, AXS, SELF-TAPPING
Type: IMPLANTABLE DEVICE | Site: CRANIAL | Status: FUNCTIONAL
Brand: UNIVERSAL NEURO 3

## 2019-01-01 DEVICE — PLATE, RIGID
Type: IMPLANTABLE DEVICE | Site: CRANIAL | Status: FUNCTIONAL
Brand: UNIVERSAL NEURO 2, QUIKFLAP

## 2019-01-01 DEVICE — LOW PROFILE BURR HOLE COVER, W/TAB, 10MM
Type: IMPLANTABLE DEVICE | Site: CRANIAL | Status: FUNCTIONAL
Brand: UNIVERSAL NEURO 2

## 2019-01-01 RX ORDER — ACETAMINOPHEN 650 MG/1
650 SUPPOSITORY RECTAL EVERY 4 HOURS
Status: DISCONTINUED | OUTPATIENT
Start: 2019-01-01 | End: 2019-01-01

## 2019-01-01 RX ORDER — HYDROCODONE BITARTRATE AND ACETAMINOPHEN 5; 325 MG/1; MG/1
2 TABLET ORAL EVERY 4 HOURS PRN
Status: DISCONTINUED | OUTPATIENT
Start: 2019-01-01 | End: 2019-01-01 | Stop reason: HOSPADM

## 2019-01-01 RX ORDER — DIVALPROEX SODIUM 500 MG/1
500 TABLET, EXTENDED RELEASE ORAL DAILY
Status: CANCELLED | OUTPATIENT
Start: 2019-01-01

## 2019-01-01 RX ORDER — SENNA AND DOCUSATE SODIUM 50; 8.6 MG/1; MG/1
2 TABLET, FILM COATED ORAL DAILY PRN
Status: DISCONTINUED | OUTPATIENT
Start: 2019-01-01 | End: 2019-01-01 | Stop reason: HOSPADM

## 2019-01-01 RX ORDER — PROPOFOL 10 MG/ML
INJECTION, EMULSION INTRAVENOUS PRN
Status: DISCONTINUED | OUTPATIENT
Start: 2019-01-01 | End: 2019-01-01 | Stop reason: SDUPTHER

## 2019-01-01 RX ORDER — 3% SODIUM CHLORIDE 3 G/100ML
125 INJECTION, SOLUTION INTRAVENOUS CONTINUOUS
Status: DISCONTINUED | OUTPATIENT
Start: 2019-01-01 | End: 2019-01-01

## 2019-01-01 RX ORDER — MIDAZOLAM HYDROCHLORIDE 1 MG/ML
INJECTION INTRAMUSCULAR; INTRAVENOUS
Status: DISCONTINUED
Start: 2019-01-01 | End: 2019-01-01

## 2019-01-01 RX ORDER — SODIUM CHLORIDE 0.9 % (FLUSH) 0.9 %
10 SYRINGE (ML) INJECTION EVERY 12 HOURS SCHEDULED
Status: DISCONTINUED | OUTPATIENT
Start: 2019-01-01 | End: 2019-01-01 | Stop reason: HOSPADM

## 2019-01-01 RX ORDER — ROSUVASTATIN CALCIUM 5 MG/1
5 TABLET, COATED ORAL DAILY
Status: DISCONTINUED | OUTPATIENT
Start: 2019-01-01 | End: 2019-01-01

## 2019-01-01 RX ORDER — LIDOCAINE HYDROCHLORIDE 10 MG/ML
INJECTION, SOLUTION INFILTRATION; PERINEURAL
Status: DISPENSED
Start: 2019-01-01 | End: 2019-01-01

## 2019-01-01 RX ORDER — 3% SODIUM CHLORIDE 3 G/100ML
25 INJECTION, SOLUTION INTRAVENOUS CONTINUOUS
Status: DISPENSED | OUTPATIENT
Start: 2019-01-01 | End: 2019-01-01

## 2019-01-01 RX ORDER — GABAPENTIN 300 MG/1
600 CAPSULE ORAL 3 TIMES DAILY
Status: DISCONTINUED | OUTPATIENT
Start: 2019-01-01 | End: 2019-01-01 | Stop reason: HOSPADM

## 2019-01-01 RX ORDER — SODIUM CHLORIDE 9 MG/ML
INJECTION, SOLUTION INTRAVENOUS CONTINUOUS
Status: DISCONTINUED | OUTPATIENT
Start: 2019-01-01 | End: 2019-01-01

## 2019-01-01 RX ORDER — SUCRALFATE 1 G/1
1 TABLET ORAL 3 TIMES DAILY
Status: DISCONTINUED | OUTPATIENT
Start: 2019-01-01 | End: 2019-01-01 | Stop reason: HOSPADM

## 2019-01-01 RX ORDER — LIDOCAINE HYDROCHLORIDE AND EPINEPHRINE BITARTRATE 20; .01 MG/ML; MG/ML
INJECTION, SOLUTION SUBCUTANEOUS PRN
Status: DISCONTINUED | OUTPATIENT
Start: 2019-01-01 | End: 2019-01-01 | Stop reason: ALTCHOICE

## 2019-01-01 RX ORDER — 0.9 % SODIUM CHLORIDE 0.9 %
1000 INTRAVENOUS SOLUTION INTRAVENOUS ONCE
Status: COMPLETED | OUTPATIENT
Start: 2019-01-01 | End: 2019-01-01

## 2019-01-01 RX ORDER — PANTOPRAZOLE SODIUM 40 MG/1
40 TABLET, DELAYED RELEASE ORAL
Status: DISCONTINUED | OUTPATIENT
Start: 2019-01-01 | End: 2019-01-01 | Stop reason: HOSPADM

## 2019-01-01 RX ORDER — LEVETIRACETAM 5 MG/ML
500 INJECTION INTRAVASCULAR EVERY 12 HOURS
Status: DISCONTINUED | OUTPATIENT
Start: 2019-01-01 | End: 2019-01-01

## 2019-01-01 RX ORDER — ALBUTEROL SULFATE 90 UG/1
2 AEROSOL, METERED RESPIRATORY (INHALATION) EVERY 6 HOURS PRN
Status: DISCONTINUED | OUTPATIENT
Start: 2019-01-01 | End: 2019-01-01

## 2019-01-01 RX ORDER — ACETAMINOPHEN 650 MG/1
SUPPOSITORY RECTAL
Status: COMPLETED
Start: 2019-01-01 | End: 2019-01-01

## 2019-01-01 RX ORDER — MORPHINE SULFATE 2 MG/ML
INJECTION, SOLUTION INTRAMUSCULAR; INTRAVENOUS
Status: DISCONTINUED
Start: 2019-01-01 | End: 2019-01-01

## 2019-01-01 RX ORDER — GABAPENTIN 400 MG/1
400 CAPSULE ORAL 3 TIMES DAILY
Status: DISCONTINUED | OUTPATIENT
Start: 2019-01-01 | End: 2019-01-01

## 2019-01-01 RX ORDER — HYDROCODONE BITARTRATE AND ACETAMINOPHEN 5; 325 MG/1; MG/1
1 TABLET ORAL EVERY 4 HOURS PRN
Status: DISCONTINUED | OUTPATIENT
Start: 2019-01-01 | End: 2019-01-01 | Stop reason: HOSPADM

## 2019-01-01 RX ORDER — ACETAMINOPHEN 325 MG/1
650 TABLET ORAL EVERY 4 HOURS PRN
Status: DISCONTINUED | OUTPATIENT
Start: 2019-01-01 | End: 2019-01-01

## 2019-01-01 RX ORDER — SODIUM CHLORIDE 1000 MG
2 TABLET, SOLUBLE MISCELLANEOUS
Status: DISCONTINUED | OUTPATIENT
Start: 2019-01-01 | End: 2019-01-01

## 2019-01-01 RX ORDER — 3% SODIUM CHLORIDE 3 G/100ML
25 INJECTION, SOLUTION INTRAVENOUS CONTINUOUS
Status: DISCONTINUED | OUTPATIENT
Start: 2019-01-01 | End: 2019-01-01

## 2019-01-01 RX ORDER — ALBUTEROL SULFATE 90 UG/1
2 AEROSOL, METERED RESPIRATORY (INHALATION) EVERY 6 HOURS PRN
Status: DISCONTINUED | OUTPATIENT
Start: 2019-01-01 | End: 2019-01-01 | Stop reason: HOSPADM

## 2019-01-01 RX ORDER — ONDANSETRON 2 MG/ML
4 INJECTION INTRAMUSCULAR; INTRAVENOUS EVERY 6 HOURS PRN
Status: DISCONTINUED | OUTPATIENT
Start: 2019-01-01 | End: 2019-01-01 | Stop reason: HOSPADM

## 2019-01-01 RX ORDER — LORAZEPAM 2 MG/ML
INJECTION INTRAMUSCULAR
Status: DISCONTINUED
Start: 2019-01-01 | End: 2019-01-01

## 2019-01-01 RX ORDER — POLYVINYL ALCOHOL 14 MG/ML
1 SOLUTION/ DROPS OPHTHALMIC
Status: DISCONTINUED | OUTPATIENT
Start: 2019-01-01 | End: 2019-01-01

## 2019-01-01 RX ORDER — SODIUM CHLORIDE 9 MG/ML
INJECTION, SOLUTION INTRAVENOUS ONCE
Status: COMPLETED | OUTPATIENT
Start: 2019-01-01 | End: 2019-01-01

## 2019-01-01 RX ORDER — FORMOTEROL FUMARATE 20 UG/2ML
20 SOLUTION RESPIRATORY (INHALATION) 2 TIMES DAILY
Status: DISCONTINUED | OUTPATIENT
Start: 2019-01-01 | End: 2019-01-01

## 2019-01-01 RX ORDER — PROCHLORPERAZINE MALEATE 10 MG
10 TABLET ORAL DAILY PRN
Status: CANCELLED | OUTPATIENT
Start: 2019-01-01

## 2019-01-01 RX ORDER — LEVETIRACETAM 500 MG/1
500 TABLET ORAL 2 TIMES DAILY
Status: DISCONTINUED | OUTPATIENT
Start: 2019-01-01 | End: 2019-01-01 | Stop reason: HOSPADM

## 2019-01-01 RX ORDER — SODIUM CHLORIDE 0.9 % (FLUSH) 0.9 %
10 SYRINGE (ML) INJECTION EVERY 12 HOURS SCHEDULED
Status: CANCELLED | OUTPATIENT
Start: 2019-01-01

## 2019-01-01 RX ORDER — FLUDROCORTISONE ACETATE 0.1 MG/1
0.1 TABLET ORAL DAILY
Status: DISCONTINUED | OUTPATIENT
Start: 2019-01-01 | End: 2019-01-01

## 2019-01-01 RX ORDER — SODIUM CHLORIDE 0.9 % (FLUSH) 0.9 %
10 SYRINGE (ML) INJECTION EVERY 12 HOURS SCHEDULED
Status: DISCONTINUED | OUTPATIENT
Start: 2019-01-01 | End: 2019-01-01

## 2019-01-01 RX ORDER — ESCITALOPRAM OXALATE 10 MG/1
10 TABLET ORAL DAILY
Qty: 30 TABLET | Refills: 0 | Status: SHIPPED | OUTPATIENT
Start: 2019-01-01

## 2019-01-01 RX ORDER — LORAZEPAM 2 MG/ML
1 INJECTION INTRAMUSCULAR
Status: DISCONTINUED | OUTPATIENT
Start: 2019-01-01 | End: 2019-04-23 | Stop reason: HOSPADM

## 2019-01-01 RX ORDER — SUCRALFATE 1 G/1
1 TABLET ORAL 3 TIMES DAILY
Status: DISCONTINUED | OUTPATIENT
Start: 2019-01-01 | End: 2019-01-01

## 2019-01-01 RX ORDER — SODIUM CHLORIDE 9 MG/ML
INJECTION, SOLUTION INTRAVENOUS CONTINUOUS PRN
Status: DISCONTINUED | OUTPATIENT
Start: 2019-01-01 | End: 2019-01-01 | Stop reason: SDUPTHER

## 2019-01-01 RX ORDER — LACTULOSE 10 G/15ML
20 SOLUTION ORAL 2 TIMES DAILY
Status: DISCONTINUED | OUTPATIENT
Start: 2019-01-01 | End: 2019-01-01

## 2019-01-01 RX ORDER — LORAZEPAM 2 MG/ML
0.5 INJECTION INTRAMUSCULAR
Status: DISCONTINUED | OUTPATIENT
Start: 2019-01-01 | End: 2019-04-23 | Stop reason: HOSPADM

## 2019-01-01 RX ORDER — ESCITALOPRAM OXALATE 10 MG/1
10 TABLET ORAL DAILY
Status: DISCONTINUED | OUTPATIENT
Start: 2019-01-01 | End: 2019-01-01

## 2019-01-01 RX ORDER — ROSUVASTATIN CALCIUM 5 MG/1
5 TABLET, COATED ORAL DAILY
Status: DISCONTINUED | OUTPATIENT
Start: 2019-01-01 | End: 2019-01-01 | Stop reason: HOSPADM

## 2019-01-01 RX ORDER — ACETAMINOPHEN 325 MG/1
650 TABLET ORAL EVERY 4 HOURS PRN
Status: CANCELLED | OUTPATIENT
Start: 2019-01-01

## 2019-01-01 RX ORDER — FLUVOXAMINE MALEATE 50 MG/1
200 TABLET, COATED ORAL NIGHTLY
Status: DISCONTINUED | OUTPATIENT
Start: 2019-01-01 | End: 2019-01-01

## 2019-01-01 RX ORDER — MINERAL OIL AND WHITE PETROLATUM 150; 830 MG/G; MG/G
OINTMENT OPHTHALMIC
Status: DISCONTINUED | OUTPATIENT
Start: 2019-01-01 | End: 2019-01-01

## 2019-01-01 RX ORDER — 0.9 % SODIUM CHLORIDE 0.9 %
500 INTRAVENOUS SOLUTION INTRAVENOUS ONCE
Status: COMPLETED | OUTPATIENT
Start: 2019-01-01 | End: 2019-01-01

## 2019-01-01 RX ORDER — PANTOPRAZOLE SODIUM 40 MG/1
40 TABLET, DELAYED RELEASE ORAL
Status: CANCELLED | OUTPATIENT
Start: 2019-01-01

## 2019-01-01 RX ORDER — PROCHLORPERAZINE MALEATE 10 MG
10 TABLET ORAL DAILY PRN
Status: DISCONTINUED | OUTPATIENT
Start: 2019-01-01 | End: 2019-01-01 | Stop reason: HOSPADM

## 2019-01-01 RX ORDER — ZOLPIDEM TARTRATE 5 MG/1
5 TABLET ORAL
Status: ON HOLD | COMMUNITY
End: 2019-01-01 | Stop reason: HOSPADM

## 2019-01-01 RX ORDER — LEVETIRACETAM 100 MG/ML
500 SOLUTION ORAL 2 TIMES DAILY
Status: DISCONTINUED | OUTPATIENT
Start: 2019-01-01 | End: 2019-01-01

## 2019-01-01 RX ORDER — DOCUSATE SODIUM 50 MG/5ML
100 LIQUID ORAL DAILY
Status: DISCONTINUED | OUTPATIENT
Start: 2019-01-01 | End: 2019-01-01

## 2019-01-01 RX ORDER — 0.9 % SODIUM CHLORIDE 0.9 %
250 INTRAVENOUS SOLUTION INTRAVENOUS ONCE
Status: COMPLETED | OUTPATIENT
Start: 2019-01-01 | End: 2019-01-01

## 2019-01-01 RX ORDER — BUDESONIDE 0.5 MG/2ML
0.5 INHALANT ORAL 2 TIMES DAILY
Status: DISCONTINUED | OUTPATIENT
Start: 2019-01-01 | End: 2019-01-01

## 2019-01-01 RX ORDER — 3% SODIUM CHLORIDE 3 G/100ML
25 INJECTION, SOLUTION INTRAVENOUS CONTINUOUS
Status: ACTIVE | OUTPATIENT
Start: 2019-01-01 | End: 2019-01-01

## 2019-01-01 RX ORDER — SENNA AND DOCUSATE SODIUM 50; 8.6 MG/1; MG/1
2 TABLET, FILM COATED ORAL DAILY PRN
Status: CANCELLED | OUTPATIENT
Start: 2019-01-01

## 2019-01-01 RX ORDER — 3% SODIUM CHLORIDE 3 G/100ML
12 INJECTION, SOLUTION INTRAVENOUS CONTINUOUS
Status: DISCONTINUED | OUTPATIENT
Start: 2019-01-01 | End: 2019-01-01

## 2019-01-01 RX ORDER — GABAPENTIN 300 MG/1
600 CAPSULE ORAL 3 TIMES DAILY
Status: DISCONTINUED | OUTPATIENT
Start: 2019-01-01 | End: 2019-01-01

## 2019-01-01 RX ORDER — DEXAMETHASONE SODIUM PHOSPHATE 10 MG/ML
INJECTION INTRAMUSCULAR; INTRAVENOUS PRN
Status: DISCONTINUED | OUTPATIENT
Start: 2019-01-01 | End: 2019-01-01 | Stop reason: SDUPTHER

## 2019-01-01 RX ORDER — SODIUM CHLORIDE 0.9 % (FLUSH) 0.9 %
10 SYRINGE (ML) INJECTION PRN
Status: CANCELLED | OUTPATIENT
Start: 2019-01-01

## 2019-01-01 RX ORDER — SUCRALFATE 1 G/1
1 TABLET ORAL 3 TIMES DAILY
Status: CANCELLED | OUTPATIENT
Start: 2019-01-01

## 2019-01-01 RX ORDER — BUTALBITAL, ACETAMINOPHEN AND CAFFEINE 50; 325; 40 MG/1; MG/1; MG/1
1 TABLET ORAL EVERY 12 HOURS PRN
Status: ON HOLD | COMMUNITY
End: 2019-01-01 | Stop reason: HOSPADM

## 2019-01-01 RX ORDER — MORPHINE SULFATE 4 MG/ML
10 INJECTION, SOLUTION INTRAMUSCULAR; INTRAVENOUS
Status: DISCONTINUED | OUTPATIENT
Start: 2019-01-01 | End: 2019-01-01

## 2019-01-01 RX ORDER — SUCCINYLCHOLINE CHLORIDE 20 MG/ML
100 INJECTION INTRAMUSCULAR; INTRAVENOUS ONCE
Status: COMPLETED | OUTPATIENT
Start: 2019-01-01 | End: 2019-01-01

## 2019-01-01 RX ORDER — LABETALOL HYDROCHLORIDE 5 MG/ML
10 INJECTION, SOLUTION INTRAVENOUS EVERY 10 MIN PRN
Status: DISCONTINUED | OUTPATIENT
Start: 2019-01-01 | End: 2019-04-23 | Stop reason: HOSPADM

## 2019-01-01 RX ORDER — SODIUM CHLORIDE 0.9 % (FLUSH) 0.9 %
10 SYRINGE (ML) INJECTION PRN
Status: DISCONTINUED | OUTPATIENT
Start: 2019-01-01 | End: 2019-04-23 | Stop reason: HOSPADM

## 2019-01-01 RX ORDER — GLYCOPYRROLATE 0.2 MG/ML
0.1 INJECTION INTRAMUSCULAR; INTRAVENOUS
Status: DISCONTINUED | OUTPATIENT
Start: 2019-01-01 | End: 2019-01-01

## 2019-01-01 RX ORDER — ETOMIDATE 2 MG/ML
15 INJECTION INTRAVENOUS ONCE
Status: COMPLETED | OUTPATIENT
Start: 2019-01-01 | End: 2019-01-01

## 2019-01-01 RX ORDER — SUMATRIPTAN 100 MG/1
100 TABLET, FILM COATED ORAL
Status: ON HOLD | COMMUNITY
End: 2019-01-01 | Stop reason: HOSPADM

## 2019-01-01 RX ORDER — VANCOMYCIN HYDROCHLORIDE 500 MG/10ML
INJECTION, POWDER, LYOPHILIZED, FOR SOLUTION INTRAVENOUS PRN
Status: DISCONTINUED | OUTPATIENT
Start: 2019-01-01 | End: 2019-01-01 | Stop reason: ALTCHOICE

## 2019-01-01 RX ORDER — HYDROCODONE BITARTRATE AND ACETAMINOPHEN 5; 325 MG/1; MG/1
1 TABLET ORAL EVERY 4 HOURS PRN
Status: CANCELLED | OUTPATIENT
Start: 2019-01-01

## 2019-01-01 RX ORDER — BACLOFEN 10 MG/1
10 TABLET ORAL 2 TIMES DAILY
Status: DISCONTINUED | OUTPATIENT
Start: 2019-01-01 | End: 2019-01-01 | Stop reason: HOSPADM

## 2019-01-01 RX ORDER — POTASSIUM BICARBONATE 25 MEQ/1
50 TABLET, EFFERVESCENT ORAL ONCE
Status: DISCONTINUED | OUTPATIENT
Start: 2019-01-01 | End: 2019-01-01 | Stop reason: CLARIF

## 2019-01-01 RX ORDER — FUROSEMIDE 10 MG/ML
INJECTION INTRAMUSCULAR; INTRAVENOUS PRN
Status: DISCONTINUED | OUTPATIENT
Start: 2019-01-01 | End: 2019-01-01 | Stop reason: SDUPTHER

## 2019-01-01 RX ORDER — FENTANYL CITRATE 50 UG/ML
25 INJECTION, SOLUTION INTRAMUSCULAR; INTRAVENOUS
Status: DISCONTINUED | OUTPATIENT
Start: 2019-01-01 | End: 2019-01-01

## 2019-01-01 RX ORDER — GLYCOPYRROLATE 0.2 MG/ML
0.2 INJECTION INTRAMUSCULAR; INTRAVENOUS EVERY 4 HOURS PRN
Status: DISCONTINUED | OUTPATIENT
Start: 2019-01-01 | End: 2019-04-23 | Stop reason: HOSPADM

## 2019-01-01 RX ORDER — DOCUSATE SODIUM 100 MG/1
100 CAPSULE, LIQUID FILLED ORAL DAILY
Status: DISCONTINUED | OUTPATIENT
Start: 2019-01-01 | End: 2019-01-01 | Stop reason: HOSPADM

## 2019-01-01 RX ORDER — DIAPER,BRIEF,INFANT-TODD,DISP
EACH MISCELLANEOUS PRN
Status: DISCONTINUED | OUTPATIENT
Start: 2019-01-01 | End: 2019-01-01 | Stop reason: ALTCHOICE

## 2019-01-01 RX ORDER — TOPIRAMATE 25 MG/1
50 TABLET ORAL 2 TIMES DAILY
Status: DISCONTINUED | OUTPATIENT
Start: 2019-01-01 | End: 2019-01-01

## 2019-01-01 RX ORDER — ACETAMINOPHEN 325 MG/1
650 TABLET ORAL EVERY 4 HOURS PRN
Status: DISCONTINUED | OUTPATIENT
Start: 2019-01-01 | End: 2019-01-01 | Stop reason: HOSPADM

## 2019-01-01 RX ORDER — HYDROXYZINE PAMOATE 25 MG/1
25 CAPSULE ORAL 2 TIMES DAILY
Status: DISCONTINUED | OUTPATIENT
Start: 2019-01-01 | End: 2019-01-01 | Stop reason: HOSPADM

## 2019-01-01 RX ORDER — PROPOFOL 10 MG/ML
INJECTION, EMULSION INTRAVENOUS CONTINUOUS PRN
Status: DISCONTINUED | OUTPATIENT
Start: 2019-01-01 | End: 2019-01-01 | Stop reason: SDUPTHER

## 2019-01-01 RX ORDER — POLYETHYLENE GLYCOL 3350 17 G/17G
17 POWDER, FOR SOLUTION ORAL DAILY
Status: DISCONTINUED | OUTPATIENT
Start: 2019-01-01 | End: 2019-01-01

## 2019-01-01 RX ORDER — FENTANYL CITRATE 50 UG/ML
INJECTION, SOLUTION INTRAMUSCULAR; INTRAVENOUS PRN
Status: DISCONTINUED | OUTPATIENT
Start: 2019-01-01 | End: 2019-01-01 | Stop reason: SDUPTHER

## 2019-01-01 RX ORDER — HYDRALAZINE HYDROCHLORIDE 20 MG/ML
10 INJECTION INTRAMUSCULAR; INTRAVENOUS EVERY 10 MIN PRN
Status: DISCONTINUED | OUTPATIENT
Start: 2019-01-01 | End: 2019-04-23 | Stop reason: HOSPADM

## 2019-01-01 RX ORDER — HYDROXYZINE HYDROCHLORIDE 25 MG/1
25 TABLET, FILM COATED ORAL 3 TIMES DAILY PRN
Status: ON HOLD | COMMUNITY
End: 2019-01-01 | Stop reason: HOSPADM

## 2019-01-01 RX ORDER — DOCUSATE SODIUM 100 MG/1
100 CAPSULE, LIQUID FILLED ORAL DAILY
Status: CANCELLED | OUTPATIENT
Start: 2019-01-01

## 2019-01-01 RX ORDER — DEXTROSE AND SODIUM CHLORIDE 5; .45 G/100ML; G/100ML
INJECTION, SOLUTION INTRAVENOUS CONTINUOUS
Status: DISCONTINUED | OUTPATIENT
Start: 2019-01-01 | End: 2019-01-01

## 2019-01-01 RX ORDER — BACLOFEN 10 MG/1
10 TABLET ORAL 2 TIMES DAILY
Status: CANCELLED | OUTPATIENT
Start: 2019-01-01

## 2019-01-01 RX ORDER — GABAPENTIN 600 MG/1
600 TABLET ORAL 3 TIMES DAILY
Status: DISCONTINUED | OUTPATIENT
Start: 2019-01-01 | End: 2019-01-01 | Stop reason: HOSPADM

## 2019-01-01 RX ORDER — HYDRALAZINE HYDROCHLORIDE 20 MG/ML
10 INJECTION INTRAMUSCULAR; INTRAVENOUS ONCE
Status: COMPLETED | OUTPATIENT
Start: 2019-01-01 | End: 2019-01-01

## 2019-01-01 RX ORDER — SODIUM CHLORIDE 0.9 % (FLUSH) 0.9 %
10 SYRINGE (ML) INJECTION PRN
Status: DISCONTINUED | OUTPATIENT
Start: 2019-01-01 | End: 2019-01-01 | Stop reason: HOSPADM

## 2019-01-01 RX ORDER — ONDANSETRON 2 MG/ML
4 INJECTION INTRAMUSCULAR; INTRAVENOUS EVERY 6 HOURS PRN
Status: CANCELLED | OUTPATIENT
Start: 2019-01-01

## 2019-01-01 RX ORDER — QUETIAPINE FUMARATE 100 MG/1
400 TABLET, FILM COATED ORAL NIGHTLY
Status: DISCONTINUED | OUTPATIENT
Start: 2019-01-01 | End: 2019-01-01 | Stop reason: HOSPADM

## 2019-01-01 RX ORDER — CEFAZOLIN SODIUM 2 G/50ML
SOLUTION INTRAVENOUS PRN
Status: DISCONTINUED | OUTPATIENT
Start: 2019-01-01 | End: 2019-01-01 | Stop reason: SDUPTHER

## 2019-01-01 RX ORDER — SODIUM CHLORIDE 0.9 % (FLUSH) 0.9 %
10 SYRINGE (ML) INJECTION 2 TIMES DAILY
Status: DISCONTINUED | OUTPATIENT
Start: 2019-01-01 | End: 2019-01-01 | Stop reason: HOSPADM

## 2019-01-01 RX ORDER — GABAPENTIN 600 MG/1
600 TABLET ORAL 3 TIMES DAILY
Status: CANCELLED | OUTPATIENT
Start: 2019-01-01

## 2019-01-01 RX ORDER — DIVALPROEX SODIUM 500 MG/1
500 TABLET, EXTENDED RELEASE ORAL 2 TIMES DAILY
Status: DISCONTINUED | OUTPATIENT
Start: 2019-01-01 | End: 2019-01-01

## 2019-01-01 RX ORDER — CEFAZOLIN SODIUM 1 G/50ML
1 SOLUTION INTRAVENOUS EVERY 8 HOURS
Status: COMPLETED | OUTPATIENT
Start: 2019-01-01 | End: 2019-01-01

## 2019-01-01 RX ORDER — ESCITALOPRAM OXALATE 10 MG/1
10 TABLET ORAL DAILY
Status: DISCONTINUED | OUTPATIENT
Start: 2019-01-01 | End: 2019-01-01 | Stop reason: HOSPADM

## 2019-01-01 RX ORDER — PROPOFOL 10 MG/ML
20 INJECTION, EMULSION INTRAVENOUS CONTINUOUS
Status: DISCONTINUED | OUTPATIENT
Start: 2019-01-01 | End: 2019-01-01

## 2019-01-01 RX ORDER — ACETAMINOPHEN 160 MG/5ML
650 SOLUTION ORAL EVERY 4 HOURS
Status: DISCONTINUED | OUTPATIENT
Start: 2019-01-01 | End: 2019-01-01

## 2019-01-01 RX ORDER — FAMOTIDINE 20 MG/1
20 TABLET, FILM COATED ORAL 2 TIMES DAILY
Status: DISCONTINUED | OUTPATIENT
Start: 2019-01-01 | End: 2019-01-01

## 2019-01-01 RX ORDER — QUETIAPINE FUMARATE 300 MG/1
300 TABLET, FILM COATED ORAL NIGHTLY
Status: DISCONTINUED | OUTPATIENT
Start: 2019-01-01 | End: 2019-01-01

## 2019-01-01 RX ORDER — ROSUVASTATIN CALCIUM 10 MG/1
5 TABLET, COATED ORAL DAILY
Status: CANCELLED | OUTPATIENT
Start: 2019-01-01

## 2019-01-01 RX ORDER — POLYETHYLENE GLYCOL 3350 17 G/17G
17 POWDER, FOR SOLUTION ORAL DAILY PRN
Status: DISCONTINUED | OUTPATIENT
Start: 2019-01-01 | End: 2019-01-01

## 2019-01-01 RX ORDER — ESCITALOPRAM OXALATE 10 MG/1
5 TABLET ORAL DAILY
Status: DISCONTINUED | OUTPATIENT
Start: 2019-01-01 | End: 2019-01-01

## 2019-01-01 RX ORDER — LEVETIRACETAM 500 MG/1
500 TABLET ORAL 2 TIMES DAILY
Status: CANCELLED | OUTPATIENT
Start: 2019-01-01

## 2019-01-01 RX ORDER — ACETAMINOPHEN 160 MG/5ML
650 SOLUTION ORAL EVERY 4 HOURS PRN
Status: DISCONTINUED | OUTPATIENT
Start: 2019-01-01 | End: 2019-04-23 | Stop reason: HOSPADM

## 2019-01-01 RX ORDER — ESCITALOPRAM OXALATE 10 MG/1
5 TABLET ORAL NIGHTLY
Status: DISCONTINUED | OUTPATIENT
Start: 2019-01-01 | End: 2019-01-01

## 2019-01-01 RX ORDER — CHLORHEXIDINE GLUCONATE 0.12 MG/ML
15 RINSE ORAL 2 TIMES DAILY
Status: DISCONTINUED | OUTPATIENT
Start: 2019-01-01 | End: 2019-01-01

## 2019-01-01 RX ORDER — DIVALPROEX SODIUM 500 MG/1
500 TABLET, EXTENDED RELEASE ORAL DAILY
Status: DISCONTINUED | OUTPATIENT
Start: 2019-01-01 | End: 2019-01-01

## 2019-01-01 RX ORDER — FENTANYL CITRATE 50 UG/ML
50 INJECTION, SOLUTION INTRAMUSCULAR; INTRAVENOUS
Status: DISCONTINUED | OUTPATIENT
Start: 2019-01-01 | End: 2019-01-01

## 2019-01-01 RX ORDER — ACETAMINOPHEN 160 MG/5ML
650 SOLUTION ORAL EVERY 4 HOURS PRN
Status: DISCONTINUED | OUTPATIENT
Start: 2019-01-01 | End: 2019-01-01

## 2019-01-01 RX ORDER — 3% SODIUM CHLORIDE 3 G/100ML
125 INJECTION, SOLUTION INTRAVENOUS CONTINUOUS
Status: DISPENSED | OUTPATIENT
Start: 2019-01-01 | End: 2019-01-01

## 2019-01-01 RX ORDER — MORPHINE SULFATE 4 MG/ML
INJECTION, SOLUTION INTRAMUSCULAR; INTRAVENOUS
Status: DISCONTINUED
Start: 2019-01-01 | End: 2019-01-01

## 2019-01-01 RX ORDER — GLYCOPYRROLATE 0.2 MG/ML
INJECTION INTRAMUSCULAR; INTRAVENOUS
Status: DISCONTINUED
Start: 2019-01-01 | End: 2019-01-01

## 2019-01-01 RX ORDER — DEXAMETHASONE SODIUM PHOSPHATE 4 MG/ML
10 INJECTION, SOLUTION INTRA-ARTICULAR; INTRALESIONAL; INTRAMUSCULAR; INTRAVENOUS; SOFT TISSUE EVERY 6 HOURS
Status: DISCONTINUED | OUTPATIENT
Start: 2019-01-01 | End: 2019-01-01

## 2019-01-01 RX ORDER — MORPHINE SULFATE 4 MG/ML
5 INJECTION, SOLUTION INTRAMUSCULAR; INTRAVENOUS ONCE
Status: COMPLETED | OUTPATIENT
Start: 2019-01-01 | End: 2019-01-01

## 2019-01-01 RX ORDER — 0.9 % SODIUM CHLORIDE 0.9 %
1000 INTRAVENOUS SOLUTION INTRAVENOUS ONCE
Status: DISCONTINUED | OUTPATIENT
Start: 2019-01-01 | End: 2019-01-01

## 2019-01-01 RX ORDER — ALBUTEROL SULFATE 90 UG/1
2 AEROSOL, METERED RESPIRATORY (INHALATION) EVERY 6 HOURS PRN
Status: CANCELLED | OUTPATIENT
Start: 2019-01-01

## 2019-01-01 RX ORDER — LORAZEPAM 2 MG/ML
2 INJECTION INTRAMUSCULAR
Status: DISCONTINUED | OUTPATIENT
Start: 2019-01-01 | End: 2019-01-01

## 2019-01-01 RX ORDER — ACETAMINOPHEN 650 MG/1
650 SUPPOSITORY RECTAL EVERY 4 HOURS PRN
Status: DISCONTINUED | OUTPATIENT
Start: 2019-01-01 | End: 2019-01-01

## 2019-01-01 RX ORDER — ROCURONIUM BROMIDE 10 MG/ML
INJECTION, SOLUTION INTRAVENOUS PRN
Status: DISCONTINUED | OUTPATIENT
Start: 2019-01-01 | End: 2019-01-01 | Stop reason: SDUPTHER

## 2019-01-01 RX ORDER — HYDROCODONE BITARTRATE AND ACETAMINOPHEN 5; 325 MG/1; MG/1
2 TABLET ORAL EVERY 4 HOURS PRN
Status: CANCELLED | OUTPATIENT
Start: 2019-01-01

## 2019-01-01 RX ORDER — DIVALPROEX SODIUM 500 MG/1
500 TABLET, EXTENDED RELEASE ORAL DAILY
Status: DISCONTINUED | OUTPATIENT
Start: 2019-01-01 | End: 2019-01-01 | Stop reason: HOSPADM

## 2019-01-01 RX ORDER — MORPHINE SULFATE 2 MG/ML
2 INJECTION, SOLUTION INTRAMUSCULAR; INTRAVENOUS
Status: DISCONTINUED | OUTPATIENT
Start: 2019-01-01 | End: 2019-01-01

## 2019-01-01 RX ORDER — LOSARTAN POTASSIUM 25 MG/1
25 TABLET ORAL DAILY
Status: DISCONTINUED | OUTPATIENT
Start: 2019-01-01 | End: 2019-01-01

## 2019-01-01 RX ORDER — LIDOCAINE HYDROCHLORIDE 10 MG/ML
INJECTION, SOLUTION INFILTRATION; PERINEURAL
Status: COMPLETED
Start: 2019-01-01 | End: 2019-01-01

## 2019-01-01 RX ORDER — ONDANSETRON 2 MG/ML
4 INJECTION INTRAMUSCULAR; INTRAVENOUS EVERY 6 HOURS PRN
Status: DISCONTINUED | OUTPATIENT
Start: 2019-01-01 | End: 2019-04-23 | Stop reason: HOSPADM

## 2019-01-01 RX ORDER — POTASSIUM CHLORIDE 7.45 MG/ML
10 INJECTION INTRAVENOUS
Status: COMPLETED | OUTPATIENT
Start: 2019-01-01 | End: 2019-01-01

## 2019-01-01 RX ADMIN — ROSUVASTATIN CALCIUM 5 MG: 5 TABLET, FILM COATED ORAL at 08:56

## 2019-01-01 RX ADMIN — TOPIRAMATE 50 MG: 25 TABLET, FILM COATED ORAL at 09:54

## 2019-01-01 RX ADMIN — LACTULOSE 20 G: 20 SOLUTION ORAL at 08:28

## 2019-01-01 RX ADMIN — LEVETIRACETAM 500 MG: 500 SOLUTION ORAL at 07:48

## 2019-01-01 RX ADMIN — LEVETIRACETAM 500 MG: 500 SOLUTION ORAL at 20:40

## 2019-01-01 RX ADMIN — FENTANYL CITRATE 50 MCG: 50 INJECTION, SOLUTION INTRAMUSCULAR; INTRAVENOUS at 08:39

## 2019-01-01 RX ADMIN — ACETAMINOPHEN 650 MG: 650 SOLUTION ORAL at 12:14

## 2019-01-01 RX ADMIN — FENTANYL CITRATE 50 MCG: 50 INJECTION, SOLUTION INTRAMUSCULAR; INTRAVENOUS at 21:32

## 2019-01-01 RX ADMIN — SUCRALFATE 1 G: 1 TABLET ORAL at 20:25

## 2019-01-01 RX ADMIN — HYDROCODONE BITARTRATE AND ACETAMINOPHEN 2 TABLET: 5; 325 TABLET ORAL at 10:04

## 2019-01-01 RX ADMIN — Medication 2 G: at 01:15

## 2019-01-01 RX ADMIN — GABAPENTIN 600 MG: 300 CAPSULE ORAL at 20:30

## 2019-01-01 RX ADMIN — HYDROXYZINE PAMOATE 25 MG: 25 CAPSULE ORAL at 20:46

## 2019-01-01 RX ADMIN — GABAPENTIN 600 MG: 300 CAPSULE ORAL at 21:23

## 2019-01-01 RX ADMIN — GABAPENTIN 600 MG: 600 TABLET, FILM COATED ORAL at 09:55

## 2019-01-01 RX ADMIN — MOMETASONE FUROATE AND FORMOTEROL FUMARATE DIHYDRATE 2 PUFF: 200; 5 AEROSOL RESPIRATORY (INHALATION) at 20:49

## 2019-01-01 RX ADMIN — SUCRALFATE 1 G: 1 TABLET ORAL at 08:56

## 2019-01-01 RX ADMIN — LEVETIRACETAM 500 MG: 500 TABLET, FILM COATED ORAL at 20:46

## 2019-01-01 RX ADMIN — FENTANYL CITRATE 25 MCG: 50 INJECTION, SOLUTION INTRAMUSCULAR; INTRAVENOUS at 18:29

## 2019-01-01 RX ADMIN — ACETAMINOPHEN 650 MG: 160 SOLUTION ORAL at 12:15

## 2019-01-01 RX ADMIN — SUCRALFATE 1 G: 1 TABLET ORAL at 09:41

## 2019-01-01 RX ADMIN — GABAPENTIN 600 MG: 300 CAPSULE ORAL at 08:46

## 2019-01-01 RX ADMIN — LORAZEPAM 1 MG: 2 INJECTION INTRAMUSCULAR; INTRAVENOUS at 10:11

## 2019-01-01 RX ADMIN — QUETIAPINE FUMARATE 300 MG: 300 TABLET ORAL at 20:30

## 2019-01-01 RX ADMIN — Medication 2 G: at 07:54

## 2019-01-01 RX ADMIN — SUCRALFATE 1 G: 1 TABLET ORAL at 14:00

## 2019-01-01 RX ADMIN — MOMETASONE FUROATE AND FORMOTEROL FUMARATE DIHYDRATE 2 PUFF: 200; 5 AEROSOL RESPIRATORY (INHALATION) at 20:52

## 2019-01-01 RX ADMIN — SUCRALFATE 1 G: 1 TABLET ORAL at 14:08

## 2019-01-01 RX ADMIN — MORPHINE SULFATE 2 MG: 4 INJECTION INTRAVENOUS at 10:12

## 2019-01-01 RX ADMIN — SUCRALFATE 1 G: 1 TABLET ORAL at 20:55

## 2019-01-01 RX ADMIN — FAMOTIDINE 20 MG: 10 INJECTION, SOLUTION INTRAVENOUS at 08:13

## 2019-01-01 RX ADMIN — GABAPENTIN 600 MG: 600 TABLET, FILM COATED ORAL at 13:06

## 2019-01-01 RX ADMIN — PANTOPRAZOLE SODIUM 40 MG: 40 TABLET, DELAYED RELEASE ORAL at 06:53

## 2019-01-01 RX ADMIN — SUCRALFATE 1 G: 1 TABLET ORAL at 08:13

## 2019-01-01 RX ADMIN — ESCITALOPRAM OXALATE 10 MG: 10 TABLET, FILM COATED ORAL at 08:12

## 2019-01-01 RX ADMIN — ACETAMINOPHEN 650 MG: 160 SOLUTION ORAL at 07:59

## 2019-01-01 RX ADMIN — ACETAMINOPHEN 650 MG: 650 SOLUTION ORAL at 08:39

## 2019-01-01 RX ADMIN — ESCITALOPRAM OXALATE 5 MG: 10 TABLET, FILM COATED ORAL at 07:48

## 2019-01-01 RX ADMIN — FAMOTIDINE 20 MG: 20 TABLET ORAL at 08:28

## 2019-01-01 RX ADMIN — SODIUM CHLORIDE 12 ML/HR: 3 INJECTION, SOLUTION INTRAVENOUS at 11:37

## 2019-01-01 RX ADMIN — DIVALPROEX SODIUM 500 MG: 500 TABLET, EXTENDED RELEASE ORAL at 20:51

## 2019-01-01 RX ADMIN — GABAPENTIN 600 MG: 300 CAPSULE ORAL at 20:45

## 2019-01-01 RX ADMIN — DOCUSATE SODIUM 100 MG: 50 LIQUID ORAL at 21:21

## 2019-01-01 RX ADMIN — SUCRALFATE 1 G: 1 TABLET ORAL at 20:21

## 2019-01-01 RX ADMIN — ACETAMINOPHEN 650 MG: 650 SUPPOSITORY RECTAL at 04:10

## 2019-01-01 RX ADMIN — SODIUM CHLORIDE TAB 1 GM 2 G: 1 TAB at 07:49

## 2019-01-01 RX ADMIN — LORAZEPAM 0.5 MG: 2 INJECTION INTRAMUSCULAR; INTRAVENOUS at 08:08

## 2019-01-01 RX ADMIN — SODIUM CHLORIDE 500 ML: 9 INJECTION, SOLUTION INTRAVENOUS at 21:50

## 2019-01-01 RX ADMIN — PANTOPRAZOLE SODIUM 40 MG: 40 TABLET, DELAYED RELEASE ORAL at 09:55

## 2019-01-01 RX ADMIN — LEVETIRACETAM 500 MG: 500 SOLUTION ORAL at 08:15

## 2019-01-01 RX ADMIN — LACTULOSE 20 G: 20 SOLUTION ORAL at 20:34

## 2019-01-01 RX ADMIN — POTASSIUM, SODIUM PHOSPHATES 280 MG-160 MG-250 MG ORAL POWDER PACKET 250 MG: POWDER IN PACKET at 08:31

## 2019-01-01 RX ADMIN — BUDESONIDE 500 MCG: 0.5 SUSPENSION RESPIRATORY (INHALATION) at 09:10

## 2019-01-01 RX ADMIN — ENOXAPARIN SODIUM 30 MG: 30 INJECTION SUBCUTANEOUS at 20:28

## 2019-01-01 RX ADMIN — LEVETIRACETAM 500 MG: 500 TABLET, FILM COATED ORAL at 21:46

## 2019-01-01 RX ADMIN — SUCRALFATE 1 G: 1 TABLET ORAL at 21:33

## 2019-01-01 RX ADMIN — PANTOPRAZOLE SODIUM 40 MG: 40 TABLET, DELAYED RELEASE ORAL at 06:38

## 2019-01-01 RX ADMIN — POLYVINYL ALCOHOL 1 DROP: 14 SOLUTION/ DROPS OPHTHALMIC at 11:37

## 2019-01-01 RX ADMIN — SUCRALFATE 1 G: 1 TABLET ORAL at 20:51

## 2019-01-01 RX ADMIN — ACETAMINOPHEN 650 MG: 650 SOLUTION ORAL at 06:15

## 2019-01-01 RX ADMIN — FORMOTEROL FUMARATE DIHYDRATE 20 MCG: 20 SOLUTION RESPIRATORY (INHALATION) at 08:20

## 2019-01-01 RX ADMIN — MORPHINE SULFATE 2 MG: 2 INJECTION, SOLUTION INTRAMUSCULAR; INTRAVENOUS at 18:04

## 2019-01-01 RX ADMIN — SUCRALFATE 1 G: 1 TABLET ORAL at 09:21

## 2019-01-01 RX ADMIN — HYDROCODONE BITARTRATE AND ACETAMINOPHEN 2 TABLET: 5; 325 TABLET ORAL at 03:32

## 2019-01-01 RX ADMIN — QUETIAPINE FUMARATE 400 MG: 100 TABLET ORAL at 20:51

## 2019-01-01 RX ADMIN — MOMETASONE FUROATE AND FORMOTEROL FUMARATE DIHYDRATE 2 PUFF: 200; 5 AEROSOL RESPIRATORY (INHALATION) at 09:35

## 2019-01-01 RX ADMIN — BUDESONIDE 500 MCG: 0.5 SUSPENSION RESPIRATORY (INHALATION) at 20:29

## 2019-01-01 RX ADMIN — LEVETIRACETAM 500 MG: 500 TABLET, FILM COATED ORAL at 09:21

## 2019-01-01 RX ADMIN — SUCRALFATE 1 G: 1 TABLET ORAL at 21:31

## 2019-01-01 RX ADMIN — MORPHINE SULFATE 2 MG: 4 INJECTION INTRAVENOUS at 10:34

## 2019-01-01 RX ADMIN — Medication 10 ML: at 08:15

## 2019-01-01 RX ADMIN — Medication 10 ML: at 08:37

## 2019-01-01 RX ADMIN — HYDROCODONE BITARTRATE AND ACETAMINOPHEN 2 TABLET: 5; 325 TABLET ORAL at 05:41

## 2019-01-01 RX ADMIN — BUDESONIDE 500 MCG: 0.5 SUSPENSION RESPIRATORY (INHALATION) at 20:52

## 2019-01-01 RX ADMIN — FAMOTIDINE 20 MG: 20 TABLET ORAL at 11:36

## 2019-01-01 RX ADMIN — CHLORHEXIDINE GLUCONATE 0.12% ORAL RINSE 15 ML: 1.2 LIQUID ORAL at 08:25

## 2019-01-01 RX ADMIN — HYDRALAZINE HYDROCHLORIDE 10 MG: 20 INJECTION INTRAMUSCULAR; INTRAVENOUS at 16:01

## 2019-01-01 RX ADMIN — PANTOPRAZOLE SODIUM 40 MG: 40 TABLET, DELAYED RELEASE ORAL at 05:23

## 2019-01-01 RX ADMIN — BUDESONIDE 500 MCG: 0.5 SUSPENSION RESPIRATORY (INHALATION) at 08:41

## 2019-01-01 RX ADMIN — MORPHINE SULFATE 2 MG: 2 INJECTION, SOLUTION INTRAMUSCULAR; INTRAVENOUS at 23:37

## 2019-01-01 RX ADMIN — FORMOTEROL FUMARATE DIHYDRATE 20 MCG: 20 SOLUTION RESPIRATORY (INHALATION) at 19:32

## 2019-01-01 RX ADMIN — ACETAMINOPHEN 650 MG: 160 SOLUTION ORAL at 11:36

## 2019-01-01 RX ADMIN — POTASSIUM, SODIUM PHOSPHATES 280 MG-160 MG-250 MG ORAL POWDER PACKET 250 MG: POWDER IN PACKET at 20:38

## 2019-01-01 RX ADMIN — CEFAZOLIN SODIUM 2 G: 2 SOLUTION INTRAVENOUS at 08:44

## 2019-01-01 RX ADMIN — SUCRALFATE 1 G: 1 TABLET ORAL at 16:19

## 2019-01-01 RX ADMIN — SODIUM CHLORIDE TAB 1 GM 2 G: 1 TAB at 08:16

## 2019-01-01 RX ADMIN — FENTANYL CITRATE 50 MCG: 50 INJECTION, SOLUTION INTRAMUSCULAR; INTRAVENOUS at 08:25

## 2019-01-01 RX ADMIN — GABAPENTIN 400 MG: 400 CAPSULE ORAL at 13:30

## 2019-01-01 RX ADMIN — FORMOTEROL FUMARATE DIHYDRATE 20 MCG: 20 SOLUTION RESPIRATORY (INHALATION) at 09:04

## 2019-01-01 RX ADMIN — HYDROCODONE BITARTRATE AND ACETAMINOPHEN 2 TABLET: 5; 325 TABLET ORAL at 14:53

## 2019-01-01 RX ADMIN — GABAPENTIN 600 MG: 600 TABLET, FILM COATED ORAL at 09:41

## 2019-01-01 RX ADMIN — HYDROCODONE BITARTRATE AND ACETAMINOPHEN 2 TABLET: 5; 325 TABLET ORAL at 07:44

## 2019-01-01 RX ADMIN — GABAPENTIN 600 MG: 300 CAPSULE ORAL at 08:30

## 2019-01-01 RX ADMIN — DOCUSATE SODIUM 100 MG: 100 CAPSULE, LIQUID FILLED ORAL at 09:41

## 2019-01-01 RX ADMIN — LORAZEPAM 1 MG: 2 INJECTION INTRAMUSCULAR; INTRAVENOUS at 10:22

## 2019-01-01 RX ADMIN — FORMOTEROL FUMARATE DIHYDRATE 20 MCG: 20 SOLUTION RESPIRATORY (INHALATION) at 09:10

## 2019-01-01 RX ADMIN — MOMETASONE FUROATE AND FORMOTEROL FUMARATE DIHYDRATE 2 PUFF: 200; 5 AEROSOL RESPIRATORY (INHALATION) at 09:55

## 2019-01-01 RX ADMIN — GABAPENTIN 600 MG: 600 TABLET, FILM COATED ORAL at 20:09

## 2019-01-01 RX ADMIN — CHLORHEXIDINE GLUCONATE 0.12% ORAL RINSE 15 ML: 1.2 LIQUID ORAL at 20:34

## 2019-01-01 RX ADMIN — ACETAMINOPHEN 650 MG: 160 SOLUTION ORAL at 02:13

## 2019-01-01 RX ADMIN — AMPICILLIN SODIUM AND SULBACTAM SODIUM 3 G: 2; 1 INJECTION, POWDER, FOR SOLUTION INTRAMUSCULAR; INTRAVENOUS at 19:45

## 2019-01-01 RX ADMIN — PROPOFOL 20 MCG/KG/MIN: 10 INJECTION, EMULSION INTRAVENOUS at 22:49

## 2019-01-01 RX ADMIN — ROSUVASTATIN CALCIUM 5 MG: 5 TABLET, FILM COATED ORAL at 08:58

## 2019-01-01 RX ADMIN — GABAPENTIN 600 MG: 300 CAPSULE ORAL at 14:00

## 2019-01-01 RX ADMIN — LABETALOL HYDROCHLORIDE 10 MG: 5 INJECTION INTRAVENOUS at 16:07

## 2019-01-01 RX ADMIN — CHLORHEXIDINE GLUCONATE 0.12% ORAL RINSE 15 ML: 1.2 LIQUID ORAL at 08:29

## 2019-01-01 RX ADMIN — ACETAMINOPHEN 650 MG: 160 SOLUTION ORAL at 12:42

## 2019-01-01 RX ADMIN — ENOXAPARIN SODIUM 30 MG: 30 INJECTION SUBCUTANEOUS at 09:41

## 2019-01-01 RX ADMIN — LEVETIRACETAM 500 MG: 5 INJECTION INTRAVENOUS at 01:05

## 2019-01-01 RX ADMIN — TETANUS TOXOID, REDUCED DIPHTHERIA TOXOID AND ACELLULAR PERTUSSIS VACCINE, ADSORBED 0.5 ML: 5; 2.5; 8; 8; 2.5 SUSPENSION INTRAMUSCULAR at 22:20

## 2019-01-01 RX ADMIN — MINERAL OIL AND WHITE PETROLATUM: 150; 830 OINTMENT OPHTHALMIC at 20:25

## 2019-01-01 RX ADMIN — HYDROCODONE BITARTRATE AND ACETAMINOPHEN 2 TABLET: 5; 325 TABLET ORAL at 12:18

## 2019-01-01 RX ADMIN — DIVALPROEX SODIUM 500 MG: 500 TABLET, EXTENDED RELEASE ORAL at 09:21

## 2019-01-01 RX ADMIN — LEVETIRACETAM 500 MG: 500 SOLUTION ORAL at 20:37

## 2019-01-01 RX ADMIN — FAMOTIDINE 20 MG: 20 TABLET ORAL at 20:54

## 2019-01-01 RX ADMIN — Medication 2 G: at 16:55

## 2019-01-01 RX ADMIN — ACETAMINOPHEN 650 MG: 160 SOLUTION ORAL at 08:15

## 2019-01-01 RX ADMIN — GABAPENTIN 600 MG: 300 CAPSULE ORAL at 09:21

## 2019-01-01 RX ADMIN — GLYCOPYRROLATE 0.2 MG: 0.2 INJECTION, SOLUTION INTRAMUSCULAR; INTRAVENOUS at 10:11

## 2019-01-01 RX ADMIN — SUCRALFATE 1 G: 1 TABLET ORAL at 09:05

## 2019-01-01 RX ADMIN — FAMOTIDINE 20 MG: 10 INJECTION, SOLUTION INTRAVENOUS at 12:09

## 2019-01-01 RX ADMIN — SENNOSIDES A AND B 5 ML: 415.36 LIQUID ORAL at 20:55

## 2019-01-01 RX ADMIN — ACETAMINOPHEN 650 MG: 160 SOLUTION ORAL at 06:58

## 2019-01-01 RX ADMIN — ROSUVASTATIN CALCIUM 5 MG: 5 TABLET, FILM COATED ORAL at 11:36

## 2019-01-01 RX ADMIN — MOMETASONE FUROATE AND FORMOTEROL FUMARATE DIHYDRATE 2 PUFF: 200; 5 AEROSOL RESPIRATORY (INHALATION) at 09:41

## 2019-01-01 RX ADMIN — CALCIUM GLUCONATE 2 G: 98 INJECTION, SOLUTION INTRAVENOUS at 08:08

## 2019-01-01 RX ADMIN — FAMOTIDINE 20 MG: 10 INJECTION, SOLUTION INTRAVENOUS at 08:56

## 2019-01-01 RX ADMIN — ACETAMINOPHEN 650 MG: 160 SOLUTION ORAL at 17:43

## 2019-01-01 RX ADMIN — POTASSIUM, SODIUM PHOSPHATES 280 MG-160 MG-250 MG ORAL POWDER PACKET 250 MG: POWDER IN PACKET at 20:54

## 2019-01-01 RX ADMIN — DOCUSATE SODIUM 100 MG: 50 LIQUID ORAL at 08:25

## 2019-01-01 RX ADMIN — FAMOTIDINE 20 MG: 20 TABLET ORAL at 20:21

## 2019-01-01 RX ADMIN — LIDOCAINE HYDROCHLORIDE 10 MG: 10 INJECTION, SOLUTION INFILTRATION; PERINEURAL at 17:06

## 2019-01-01 RX ADMIN — SENNOSIDES A AND B 5 ML: 415.36 LIQUID ORAL at 21:20

## 2019-01-01 RX ADMIN — POTASSIUM BICARBONATE 40 MEQ: 782 TABLET, EFFERVESCENT ORAL at 08:13

## 2019-01-01 RX ADMIN — LEVETIRACETAM 500 MG: 5 INJECTION INTRAVENOUS at 11:52

## 2019-01-01 RX ADMIN — ROSUVASTATIN CALCIUM 5 MG: 5 TABLET, FILM COATED ORAL at 08:25

## 2019-01-01 RX ADMIN — PHENYLEPHRINE HYDROCHLORIDE 100 MCG: 10 INJECTION INTRAVENOUS at 08:53

## 2019-01-01 RX ADMIN — HYDROCODONE BITARTRATE AND ACETAMINOPHEN 2 TABLET: 5; 325 TABLET ORAL at 14:07

## 2019-01-01 RX ADMIN — SODIUM CHLORIDE TAB 1 GM 2 G: 1 TAB at 16:32

## 2019-01-01 RX ADMIN — HYDRALAZINE HYDROCHLORIDE 10 MG: 20 INJECTION INTRAMUSCULAR; INTRAVENOUS at 21:58

## 2019-01-01 RX ADMIN — FAMOTIDINE 20 MG: 20 TABLET ORAL at 08:32

## 2019-01-01 RX ADMIN — ACETAMINOPHEN 650 MG: 160 SOLUTION ORAL at 16:32

## 2019-01-01 RX ADMIN — HYDROCODONE BITARTRATE AND ACETAMINOPHEN 2 TABLET: 5; 325 TABLET ORAL at 21:03

## 2019-01-01 RX ADMIN — LEVETIRACETAM 500 MG: 500 SOLUTION ORAL at 11:35

## 2019-01-01 RX ADMIN — LABETALOL HYDROCHLORIDE 10 MG: 5 INJECTION INTRAVENOUS at 00:14

## 2019-01-01 RX ADMIN — HYDROXYZINE PAMOATE 25 MG: 25 CAPSULE ORAL at 22:06

## 2019-01-01 RX ADMIN — POLYETHYLENE GLYCOL 3350 17 G: 17 POWDER, FOR SOLUTION ORAL at 08:00

## 2019-01-01 RX ADMIN — LACTULOSE 20 G: 20 SOLUTION ORAL at 20:44

## 2019-01-01 RX ADMIN — BACLOFEN 10 MG: 10 TABLET ORAL at 08:58

## 2019-01-01 RX ADMIN — PANTOPRAZOLE SODIUM 40 MG: 40 TABLET, DELAYED RELEASE ORAL at 09:12

## 2019-01-01 RX ADMIN — SUCRALFATE 1 G: 1 TABLET ORAL at 15:17

## 2019-01-01 RX ADMIN — GABAPENTIN 600 MG: 300 CAPSULE ORAL at 14:02

## 2019-01-01 RX ADMIN — HYDROCODONE BITARTRATE AND ACETAMINOPHEN 2 TABLET: 5; 325 TABLET ORAL at 03:27

## 2019-01-01 RX ADMIN — ACETAMINOPHEN 650 MG: 650 SUPPOSITORY RECTAL at 19:29

## 2019-01-01 RX ADMIN — LOSARTAN POTASSIUM 25 MG: 25 TABLET, FILM COATED ORAL at 08:16

## 2019-01-01 RX ADMIN — FAMOTIDINE 20 MG: 20 TABLET ORAL at 07:48

## 2019-01-01 RX ADMIN — ACETAMINOPHEN 650 MG: 160 SOLUTION ORAL at 00:00

## 2019-01-01 RX ADMIN — ACETAMINOPHEN 650 MG: 650 SUPPOSITORY RECTAL at 16:14

## 2019-01-01 RX ADMIN — ACETAMINOPHEN 650 MG: 160 SOLUTION ORAL at 07:02

## 2019-01-01 RX ADMIN — SUCRALFATE 1 G: 1 TABLET ORAL at 13:06

## 2019-01-01 RX ADMIN — Medication 10 ML: at 20:26

## 2019-01-01 RX ADMIN — PANTOPRAZOLE SODIUM 40 MG: 40 TABLET, DELAYED RELEASE ORAL at 06:34

## 2019-01-01 RX ADMIN — FORMOTEROL FUMARATE DIHYDRATE 20 MCG: 20 SOLUTION RESPIRATORY (INHALATION) at 20:18

## 2019-01-01 RX ADMIN — BACLOFEN 10 MG: 10 TABLET ORAL at 20:46

## 2019-01-01 RX ADMIN — GABAPENTIN 600 MG: 300 CAPSULE ORAL at 08:58

## 2019-01-01 RX ADMIN — ENOXAPARIN SODIUM 30 MG: 30 INJECTION SUBCUTANEOUS at 21:57

## 2019-01-01 RX ADMIN — POLYVINYL ALCOHOL 1 DROP: 14 SOLUTION/ DROPS OPHTHALMIC at 20:55

## 2019-01-01 RX ADMIN — MOMETASONE FUROATE AND FORMOTEROL FUMARATE DIHYDRATE 2 PUFF: 200; 5 AEROSOL RESPIRATORY (INHALATION) at 08:59

## 2019-01-01 RX ADMIN — PROPOFOL 75 MCG/KG/MIN: 10 INJECTION, EMULSION INTRAVENOUS at 08:46

## 2019-01-01 RX ADMIN — LEVETIRACETAM 500 MG: 5 INJECTION INTRAVENOUS at 23:54

## 2019-01-01 RX ADMIN — GABAPENTIN 600 MG: 300 CAPSULE ORAL at 21:46

## 2019-01-01 RX ADMIN — ACETAMINOPHEN 650 MG: 650 SUPPOSITORY RECTAL at 01:16

## 2019-01-01 RX ADMIN — BUDESONIDE 500 MCG: 0.5 SUSPENSION RESPIRATORY (INHALATION) at 05:42

## 2019-01-01 RX ADMIN — GABAPENTIN 400 MG: 400 CAPSULE ORAL at 11:36

## 2019-01-01 RX ADMIN — FORMOTEROL FUMARATE DIHYDRATE 20 MCG: 20 SOLUTION RESPIRATORY (INHALATION) at 22:21

## 2019-01-01 RX ADMIN — CHLORHEXIDINE GLUCONATE 0.12% ORAL RINSE 15 ML: 1.2 LIQUID ORAL at 21:33

## 2019-01-01 RX ADMIN — POLYVINYL ALCOHOL 1 DROP: 14 SOLUTION/ DROPS OPHTHALMIC at 07:49

## 2019-01-01 RX ADMIN — Medication 2 MCG/MIN: at 04:00

## 2019-01-01 RX ADMIN — ESCITALOPRAM OXALATE 10 MG: 10 TABLET, FILM COATED ORAL at 08:56

## 2019-01-01 RX ADMIN — LEVETIRACETAM 500 MG: 5 INJECTION INTRAVENOUS at 12:02

## 2019-01-01 RX ADMIN — LACTULOSE 20 G: 20 SOLUTION ORAL at 07:54

## 2019-01-01 RX ADMIN — SUCRALFATE 1 G: 1 TABLET ORAL at 20:46

## 2019-01-01 RX ADMIN — SODIUM CHLORIDE 25 ML/HR: 3 INJECTION, SOLUTION INTRAVENOUS at 23:51

## 2019-01-01 RX ADMIN — SODIUM CHLORIDE: 9 INJECTION, SOLUTION INTRAVENOUS at 08:25

## 2019-01-01 RX ADMIN — ROSUVASTATIN CALCIUM 5 MG: 5 TABLET, FILM COATED ORAL at 09:21

## 2019-01-01 RX ADMIN — LEVETIRACETAM 500 MG: 5 INJECTION INTRAVENOUS at 01:36

## 2019-01-01 RX ADMIN — DIVALPROEX SODIUM 500 MG: 500 TABLET, EXTENDED RELEASE ORAL at 09:41

## 2019-01-01 RX ADMIN — BUDESONIDE 500 MCG: 0.5 SUSPENSION RESPIRATORY (INHALATION) at 08:35

## 2019-01-01 RX ADMIN — POTASSIUM, SODIUM PHOSPHATES 280 MG-160 MG-250 MG ORAL POWDER PACKET 250 MG: POWDER IN PACKET at 17:59

## 2019-01-01 RX ADMIN — POLYETHYLENE GLYCOL 3350 17 G: 17 POWDER, FOR SOLUTION ORAL at 08:41

## 2019-01-01 RX ADMIN — SODIUM CHLORIDE 1000 ML: 9 INJECTION, SOLUTION INTRAVENOUS at 21:28

## 2019-01-01 RX ADMIN — FORMOTEROL FUMARATE DIHYDRATE 20 MCG: 20 SOLUTION RESPIRATORY (INHALATION) at 20:09

## 2019-01-01 RX ADMIN — SODIUM CHLORIDE 25 ML/HR: 3 INJECTION, SOLUTION INTRAVENOUS at 11:05

## 2019-01-01 RX ADMIN — BACLOFEN 10 MG: 10 TABLET ORAL at 09:41

## 2019-01-01 RX ADMIN — LACTULOSE 20 G: 20 SOLUTION ORAL at 21:00

## 2019-01-01 RX ADMIN — GABAPENTIN 600 MG: 300 CAPSULE ORAL at 13:36

## 2019-01-01 RX ADMIN — CHLORHEXIDINE GLUCONATE 0.12% ORAL RINSE 15 ML: 1.2 LIQUID ORAL at 14:24

## 2019-01-01 RX ADMIN — GABAPENTIN 600 MG: 300 CAPSULE ORAL at 14:08

## 2019-01-01 RX ADMIN — POTASSIUM, SODIUM PHOSPHATES 280 MG-160 MG-250 MG ORAL POWDER PACKET 250 MG: POWDER IN PACKET at 08:27

## 2019-01-01 RX ADMIN — HYDROCODONE BITARTRATE AND ACETAMINOPHEN 2 TABLET: 5; 325 TABLET ORAL at 20:10

## 2019-01-01 RX ADMIN — QUETIAPINE FUMARATE 300 MG: 300 TABLET ORAL at 20:35

## 2019-01-01 RX ADMIN — LEVETIRACETAM 500 MG: 500 SOLUTION ORAL at 21:21

## 2019-01-01 RX ADMIN — HYDROCODONE BITARTRATE AND ACETAMINOPHEN 2 TABLET: 5; 325 TABLET ORAL at 14:02

## 2019-01-01 RX ADMIN — CHLORHEXIDINE GLUCONATE 0.12% ORAL RINSE 15 ML: 1.2 LIQUID ORAL at 09:18

## 2019-01-01 RX ADMIN — CHLORHEXIDINE GLUCONATE 0.12% ORAL RINSE 15 ML: 1.2 LIQUID ORAL at 20:54

## 2019-01-01 RX ADMIN — SUCRALFATE 1 G: 1 TABLET ORAL at 14:01

## 2019-01-01 RX ADMIN — LOSARTAN POTASSIUM 25 MG: 25 TABLET, FILM COATED ORAL at 08:56

## 2019-01-01 RX ADMIN — SODIUM CHLORIDE 12 ML/HR: 3 INJECTION, SOLUTION INTRAVENOUS at 04:40

## 2019-01-01 RX ADMIN — LOSARTAN POTASSIUM 25 MG: 25 TABLET, FILM COATED ORAL at 11:35

## 2019-01-01 RX ADMIN — ESCITALOPRAM OXALATE 5 MG: 10 TABLET, FILM COATED ORAL at 14:08

## 2019-01-01 RX ADMIN — Medication 2 G: at 01:25

## 2019-01-01 RX ADMIN — SODIUM CHLORIDE 250 ML: 9 INJECTION, SOLUTION INTRAVENOUS at 13:40

## 2019-01-01 RX ADMIN — ACETAMINOPHEN 650 MG: 160 SOLUTION ORAL at 15:38

## 2019-01-01 RX ADMIN — DOCUSATE SODIUM 100 MG: 100 CAPSULE, LIQUID FILLED ORAL at 08:46

## 2019-01-01 RX ADMIN — BACLOFEN 10 MG: 10 TABLET ORAL at 09:04

## 2019-01-01 RX ADMIN — FORMOTEROL FUMARATE DIHYDRATE 20 MCG: 20 SOLUTION RESPIRATORY (INHALATION) at 20:01

## 2019-01-01 RX ADMIN — LACTULOSE 20 G: 20 SOLUTION ORAL at 09:24

## 2019-01-01 RX ADMIN — SODIUM CHLORIDE: 900 INJECTION INTRAVENOUS at 14:12

## 2019-01-01 RX ADMIN — LACTULOSE 20 G: 20 SOLUTION ORAL at 11:51

## 2019-01-01 RX ADMIN — FENTANYL CITRATE 50 MCG: 50 INJECTION, SOLUTION INTRAMUSCULAR; INTRAVENOUS at 12:00

## 2019-01-01 RX ADMIN — Medication 10 ML: at 20:40

## 2019-01-01 RX ADMIN — MORPHINE SULFATE 2 MG: 2 INJECTION, SOLUTION INTRAMUSCULAR; INTRAVENOUS at 11:12

## 2019-01-01 RX ADMIN — ENOXAPARIN SODIUM 30 MG: 30 INJECTION SUBCUTANEOUS at 22:09

## 2019-01-01 RX ADMIN — GABAPENTIN 600 MG: 300 CAPSULE ORAL at 13:34

## 2019-01-01 RX ADMIN — SENNOSIDES AND DOCUSATE SODIUM 2 TABLET: 8.6; 5 TABLET ORAL at 21:23

## 2019-01-01 RX ADMIN — DOCUSATE SODIUM 100 MG: 100 CAPSULE, LIQUID FILLED ORAL at 08:58

## 2019-01-01 RX ADMIN — BACLOFEN 10 MG: 10 TABLET ORAL at 20:51

## 2019-01-01 RX ADMIN — HYDROCODONE BITARTRATE AND ACETAMINOPHEN 2 TABLET: 5; 325 TABLET ORAL at 09:47

## 2019-01-01 RX ADMIN — ACETAMINOPHEN 650 MG: 650 SUPPOSITORY RECTAL at 00:15

## 2019-01-01 RX ADMIN — MORPHINE SULFATE 2 MG: 2 INJECTION, SOLUTION INTRAMUSCULAR; INTRAVENOUS at 01:27

## 2019-01-01 RX ADMIN — HYDROCODONE BITARTRATE AND ACETAMINOPHEN 2 TABLET: 5; 325 TABLET ORAL at 23:34

## 2019-01-01 RX ADMIN — GLYCOPYRROLATE 0.2 MG: 0.2 INJECTION, SOLUTION INTRAMUSCULAR; INTRAVENOUS at 05:25

## 2019-01-01 RX ADMIN — DIVALPROEX SODIUM 500 MG: 500 TABLET, EXTENDED RELEASE ORAL at 09:55

## 2019-01-01 RX ADMIN — ESCITALOPRAM OXALATE 5 MG: 10 TABLET, FILM COATED ORAL at 08:00

## 2019-01-01 RX ADMIN — SUCRALFATE 1 G: 1 TABLET ORAL at 08:15

## 2019-01-01 RX ADMIN — GABAPENTIN 600 MG: 300 CAPSULE ORAL at 20:54

## 2019-01-01 RX ADMIN — LEVETIRACETAM 500 MG: 5 INJECTION INTRAVENOUS at 01:51

## 2019-01-01 RX ADMIN — MORPHINE SULFATE 2 MG: 2 INJECTION, SOLUTION INTRAMUSCULAR; INTRAVENOUS at 20:21

## 2019-01-01 RX ADMIN — BUDESONIDE 500 MCG: 0.5 SUSPENSION RESPIRATORY (INHALATION) at 05:28

## 2019-01-01 RX ADMIN — LOSARTAN POTASSIUM 25 MG: 25 TABLET, FILM COATED ORAL at 07:59

## 2019-01-01 RX ADMIN — LEVETIRACETAM 500 MG: 500 SOLUTION ORAL at 07:59

## 2019-01-01 RX ADMIN — SODIUM CHLORIDE: 9 INJECTION, SOLUTION INTRAVENOUS at 02:19

## 2019-01-01 RX ADMIN — ENOXAPARIN SODIUM 30 MG: 30 INJECTION SUBCUTANEOUS at 09:55

## 2019-01-01 RX ADMIN — HYDROCODONE BITARTRATE AND ACETAMINOPHEN 1 TABLET: 5; 325 TABLET ORAL at 09:21

## 2019-01-01 RX ADMIN — ROSUVASTATIN CALCIUM 5 MG: 5 TABLET, FILM COATED ORAL at 07:58

## 2019-01-01 RX ADMIN — SODIUM CHLORIDE TAB 1 GM 2 G: 1 TAB at 08:30

## 2019-01-01 RX ADMIN — VANCOMYCIN HYDROCHLORIDE 1.5 G: 10 INJECTION, POWDER, LYOPHILIZED, FOR SOLUTION INTRAVENOUS at 11:02

## 2019-01-01 RX ADMIN — CHLORHEXIDINE GLUCONATE 0.12% ORAL RINSE 15 ML: 1.2 LIQUID ORAL at 08:27

## 2019-01-01 RX ADMIN — BUDESONIDE 500 MCG: 0.5 SUSPENSION RESPIRATORY (INHALATION) at 20:06

## 2019-01-01 RX ADMIN — LOSARTAN POTASSIUM 25 MG: 25 TABLET, FILM COATED ORAL at 08:12

## 2019-01-01 RX ADMIN — ROSUVASTATIN CALCIUM 5 MG: 5 TABLET, FILM COATED ORAL at 09:06

## 2019-01-01 RX ADMIN — Medication 10 ML: at 21:33

## 2019-01-01 RX ADMIN — GABAPENTIN 400 MG: 400 CAPSULE ORAL at 20:31

## 2019-01-01 RX ADMIN — ROSUVASTATIN CALCIUM 5 MG: 5 TABLET, FILM COATED ORAL at 08:12

## 2019-01-01 RX ADMIN — POLYETHYLENE GLYCOL 3350 17 G: 17 POWDER, FOR SOLUTION ORAL at 07:48

## 2019-01-01 RX ADMIN — AMPICILLIN SODIUM AND SULBACTAM SODIUM 3 G: 2; 1 INJECTION, POWDER, FOR SOLUTION INTRAMUSCULAR; INTRAVENOUS at 01:43

## 2019-01-01 RX ADMIN — DOCUSATE SODIUM 100 MG: 100 CAPSULE, LIQUID FILLED ORAL at 20:28

## 2019-01-01 RX ADMIN — QUETIAPINE FUMARATE 400 MG: 100 TABLET ORAL at 20:29

## 2019-01-01 RX ADMIN — SUCRALFATE 1 G: 1 TABLET ORAL at 21:23

## 2019-01-01 RX ADMIN — LACTULOSE 20 G: 20 SOLUTION ORAL at 20:46

## 2019-01-01 RX ADMIN — GABAPENTIN 600 MG: 300 CAPSULE ORAL at 21:30

## 2019-01-01 RX ADMIN — Medication 10 ML: at 21:31

## 2019-01-01 RX ADMIN — SENNOSIDES A AND B 5 ML: 415.36 LIQUID ORAL at 21:33

## 2019-01-01 RX ADMIN — FAMOTIDINE 20 MG: 20 TABLET ORAL at 20:35

## 2019-01-01 RX ADMIN — DOCUSATE SODIUM 100 MG: 50 LIQUID ORAL at 08:56

## 2019-01-01 RX ADMIN — FORMOTEROL FUMARATE DIHYDRATE 20 MCG: 20 SOLUTION RESPIRATORY (INHALATION) at 08:34

## 2019-01-01 RX ADMIN — NOREPINEPHRINE BITARTRATE 30 MCG/MIN: 1 INJECTION INTRAVENOUS at 16:03

## 2019-01-01 RX ADMIN — CHLORHEXIDINE GLUCONATE 0.12% ORAL RINSE 15 ML: 1.2 LIQUID ORAL at 21:21

## 2019-01-01 RX ADMIN — HYDROCODONE BITARTRATE AND ACETAMINOPHEN 2 TABLET: 5; 325 TABLET ORAL at 22:00

## 2019-01-01 RX ADMIN — ACETAMINOPHEN 650 MG: 160 SOLUTION ORAL at 14:12

## 2019-01-01 RX ADMIN — GABAPENTIN 600 MG: 300 CAPSULE ORAL at 13:06

## 2019-01-01 RX ADMIN — LORAZEPAM 0.5 MG: 2 INJECTION INTRAMUSCULAR; INTRAVENOUS at 16:34

## 2019-01-01 RX ADMIN — BUDESONIDE 500 MCG: 0.5 SUSPENSION RESPIRATORY (INHALATION) at 09:05

## 2019-01-01 RX ADMIN — BACLOFEN 10 MG: 10 TABLET ORAL at 20:09

## 2019-01-01 RX ADMIN — BACLOFEN 10 MG: 10 TABLET ORAL at 21:22

## 2019-01-01 RX ADMIN — MIDAZOLAM HYDROCHLORIDE 2 MG: 2 INJECTION, SOLUTION INTRAMUSCULAR; INTRAVENOUS at 09:50

## 2019-01-01 RX ADMIN — GABAPENTIN 400 MG: 400 CAPSULE ORAL at 20:40

## 2019-01-01 RX ADMIN — ETOMIDATE 15 MG: 2 INJECTION INTRAVENOUS at 05:30

## 2019-01-01 RX ADMIN — GABAPENTIN 400 MG: 400 CAPSULE ORAL at 14:11

## 2019-01-01 RX ADMIN — Medication 2 G: at 08:45

## 2019-01-01 RX ADMIN — MORPHINE SULFATE 5 MG: 4 INJECTION INTRAVENOUS at 12:31

## 2019-01-01 RX ADMIN — HYDROXYZINE PAMOATE 25 MG: 25 CAPSULE ORAL at 08:58

## 2019-01-01 RX ADMIN — POTASSIUM, SODIUM PHOSPHATES 280 MG-160 MG-250 MG ORAL POWDER PACKET 250 MG: POWDER IN PACKET at 12:38

## 2019-01-01 RX ADMIN — ACETAMINOPHEN 650 MG: 160 SOLUTION ORAL at 23:09

## 2019-01-01 RX ADMIN — MOMETASONE FUROATE AND FORMOTEROL FUMARATE DIHYDRATE 2 PUFF: 200; 5 AEROSOL RESPIRATORY (INHALATION) at 20:11

## 2019-01-01 RX ADMIN — SODIUM CHLORIDE: 9 INJECTION, SOLUTION INTRAVENOUS at 20:23

## 2019-01-01 RX ADMIN — FAMOTIDINE 20 MG: 20 TABLET ORAL at 20:25

## 2019-01-01 RX ADMIN — SENNOSIDES A AND B 5 ML: 415.36 LIQUID ORAL at 20:35

## 2019-01-01 RX ADMIN — FENTANYL CITRATE 50 MCG: 50 INJECTION, SOLUTION INTRAMUSCULAR; INTRAVENOUS at 08:32

## 2019-01-01 RX ADMIN — AMPICILLIN SODIUM AND SULBACTAM SODIUM 3 G: 2; 1 INJECTION, POWDER, FOR SOLUTION INTRAMUSCULAR; INTRAVENOUS at 20:22

## 2019-01-01 RX ADMIN — MOMETASONE FUROATE AND FORMOTEROL FUMARATE DIHYDRATE 2 PUFF: 200; 5 AEROSOL RESPIRATORY (INHALATION) at 20:28

## 2019-01-01 RX ADMIN — ACETAMINOPHEN 650 MG: 650 SUPPOSITORY RECTAL at 12:18

## 2019-01-01 RX ADMIN — MORPHINE SULFATE 2 MG: 4 INJECTION INTRAVENOUS at 11:44

## 2019-01-01 RX ADMIN — SODIUM CHLORIDE TAB 1 GM 2 G: 1 TAB at 11:36

## 2019-01-01 RX ADMIN — SUCRALFATE 1 G: 1 TABLET ORAL at 13:30

## 2019-01-01 RX ADMIN — GABAPENTIN 600 MG: 300 CAPSULE ORAL at 21:58

## 2019-01-01 RX ADMIN — QUETIAPINE FUMARATE 400 MG: 100 TABLET ORAL at 21:46

## 2019-01-01 RX ADMIN — SUCRALFATE 1 G: 1 TABLET ORAL at 20:35

## 2019-01-01 RX ADMIN — ROSUVASTATIN CALCIUM 5 MG: 5 TABLET, FILM COATED ORAL at 07:54

## 2019-01-01 RX ADMIN — LEVETIRACETAM 500 MG: 5 INJECTION INTRAVENOUS at 00:20

## 2019-01-01 RX ADMIN — ACETAMINOPHEN 650 MG: 650 SOLUTION ORAL at 13:27

## 2019-01-01 RX ADMIN — LEVETIRACETAM 500 MG: 500 TABLET, FILM COATED ORAL at 21:22

## 2019-01-01 RX ADMIN — LEVETIRACETAM 500 MG: 500 SOLUTION ORAL at 20:30

## 2019-01-01 RX ADMIN — GABAPENTIN 600 MG: 300 CAPSULE ORAL at 08:56

## 2019-01-01 RX ADMIN — ROSUVASTATIN CALCIUM 5 MG: 5 TABLET, FILM COATED ORAL at 08:16

## 2019-01-01 RX ADMIN — GABAPENTIN 600 MG: 300 CAPSULE ORAL at 08:13

## 2019-01-01 RX ADMIN — SODIUM CHLORIDE TAB 1 GM 2 G: 1 TAB at 12:42

## 2019-01-01 RX ADMIN — HYDROXYZINE PAMOATE 25 MG: 25 CAPSULE ORAL at 08:09

## 2019-01-01 RX ADMIN — HYDROCODONE BITARTRATE AND ACETAMINOPHEN 1 TABLET: 5; 325 TABLET ORAL at 02:24

## 2019-01-01 RX ADMIN — ACETAMINOPHEN 650 MG: 650 SUPPOSITORY RECTAL at 15:45

## 2019-01-01 RX ADMIN — ACETAMINOPHEN 650 MG: 650 SUPPOSITORY RECTAL at 23:53

## 2019-01-01 RX ADMIN — Medication 10 ML: at 15:18

## 2019-01-01 RX ADMIN — PROPOFOL 50 MG: 10 INJECTION, EMULSION INTRAVENOUS at 08:32

## 2019-01-01 RX ADMIN — LABETALOL HYDROCHLORIDE 10 MG: 5 INJECTION INTRAVENOUS at 19:39

## 2019-01-01 RX ADMIN — Medication 10 ML: at 08:00

## 2019-01-01 RX ADMIN — GABAPENTIN 600 MG: 300 CAPSULE ORAL at 08:24

## 2019-01-01 RX ADMIN — HYDROCODONE BITARTRATE AND ACETAMINOPHEN 2 TABLET: 5; 325 TABLET ORAL at 16:16

## 2019-01-01 RX ADMIN — Medication 10 ML: at 07:49

## 2019-01-01 RX ADMIN — Medication 2 G: at 01:32

## 2019-01-01 RX ADMIN — AMPICILLIN SODIUM AND SULBACTAM SODIUM 3 G: 2; 1 INJECTION, POWDER, FOR SOLUTION INTRAMUSCULAR; INTRAVENOUS at 02:04

## 2019-01-01 RX ADMIN — LEVETIRACETAM 500 MG: 5 INJECTION INTRAVENOUS at 12:10

## 2019-01-01 RX ADMIN — POTASSIUM BICARBONATE 40 MEQ: 782 TABLET, EFFERVESCENT ORAL at 08:56

## 2019-01-01 RX ADMIN — SODIUM CHLORIDE: 9 INJECTION, SOLUTION INTRAVENOUS at 05:40

## 2019-01-01 RX ADMIN — ENOXAPARIN SODIUM 30 MG: 30 INJECTION SUBCUTANEOUS at 21:45

## 2019-01-01 RX ADMIN — FORMOTEROL FUMARATE DIHYDRATE 20 MCG: 20 SOLUTION RESPIRATORY (INHALATION) at 08:41

## 2019-01-01 RX ADMIN — LACTULOSE 20 G: 20 SOLUTION ORAL at 20:23

## 2019-01-01 RX ADMIN — GABAPENTIN 600 MG: 300 CAPSULE ORAL at 13:13

## 2019-01-01 RX ADMIN — GABAPENTIN 600 MG: 300 CAPSULE ORAL at 20:41

## 2019-01-01 RX ADMIN — FORMOTEROL FUMARATE DIHYDRATE 20 MCG: 20 SOLUTION RESPIRATORY (INHALATION) at 08:06

## 2019-01-01 RX ADMIN — BACLOFEN 10 MG: 10 TABLET ORAL at 09:55

## 2019-01-01 RX ADMIN — POTASSIUM CHLORIDE 10 MEQ: 10 INJECTION, SOLUTION INTRAVENOUS at 09:49

## 2019-01-01 RX ADMIN — ROSUVASTATIN CALCIUM 5 MG: 5 TABLET, FILM COATED ORAL at 09:41

## 2019-01-01 RX ADMIN — CHLORHEXIDINE GLUCONATE 0.12% ORAL RINSE 15 ML: 1.2 LIQUID ORAL at 08:12

## 2019-01-01 RX ADMIN — LABETALOL HYDROCHLORIDE 10 MG: 5 INJECTION INTRAVENOUS at 15:39

## 2019-01-01 RX ADMIN — ACETAMINOPHEN 650 MG: 160 SOLUTION ORAL at 20:40

## 2019-01-01 RX ADMIN — ONDANSETRON HYDROCHLORIDE 4 MG: 2 SOLUTION INTRAMUSCULAR; INTRAVENOUS at 02:02

## 2019-01-01 RX ADMIN — CHLORHEXIDINE GLUCONATE 0.12% ORAL RINSE 15 ML: 1.2 LIQUID ORAL at 07:59

## 2019-01-01 RX ADMIN — SUCRALFATE 1 G: 1 TABLET ORAL at 21:21

## 2019-01-01 RX ADMIN — LEVETIRACETAM 500 MG: 500 SOLUTION ORAL at 20:31

## 2019-01-01 RX ADMIN — FLUDROCORTISONE ACETATE 0.1 MG: 0.1 TABLET ORAL at 13:30

## 2019-01-01 RX ADMIN — ROSUVASTATIN CALCIUM 5 MG: 5 TABLET, FILM COATED ORAL at 08:30

## 2019-01-01 RX ADMIN — GABAPENTIN 600 MG: 300 CAPSULE ORAL at 08:32

## 2019-01-01 RX ADMIN — SUCRALFATE 1 G: 1 TABLET ORAL at 14:11

## 2019-01-01 RX ADMIN — LOSARTAN POTASSIUM 25 MG: 25 TABLET, FILM COATED ORAL at 17:52

## 2019-01-01 RX ADMIN — FLUDROCORTISONE ACETATE 0.1 MG: 0.1 TABLET ORAL at 07:58

## 2019-01-01 RX ADMIN — Medication 10 ML: at 20:35

## 2019-01-01 RX ADMIN — Medication 2 G: at 18:05

## 2019-01-01 RX ADMIN — LOSARTAN POTASSIUM 25 MG: 25 TABLET, FILM COATED ORAL at 08:28

## 2019-01-01 RX ADMIN — SODIUM CHLORIDE 125 ML/HR: 3 INJECTION, SOLUTION INTRAVENOUS at 12:51

## 2019-01-01 RX ADMIN — ONDANSETRON HYDROCHLORIDE 4 MG: 2 SOLUTION INTRAMUSCULAR; INTRAVENOUS at 16:17

## 2019-01-01 RX ADMIN — ROSUVASTATIN CALCIUM 5 MG: 5 TABLET, FILM COATED ORAL at 09:55

## 2019-01-01 RX ADMIN — LEVETIRACETAM 500 MG: 500 TABLET, FILM COATED ORAL at 20:51

## 2019-01-01 RX ADMIN — ROSUVASTATIN CALCIUM 5 MG: 5 TABLET, FILM COATED ORAL at 08:09

## 2019-01-01 RX ADMIN — LACTULOSE 20 G: 20 SOLUTION ORAL at 08:04

## 2019-01-01 RX ADMIN — QUETIAPINE FUMARATE 400 MG: 100 TABLET ORAL at 02:00

## 2019-01-01 RX ADMIN — SODIUM CHLORIDE: 900 INJECTION INTRAVENOUS at 01:21

## 2019-01-01 RX ADMIN — ENOXAPARIN SODIUM 30 MG: 30 INJECTION SUBCUTANEOUS at 21:26

## 2019-01-01 RX ADMIN — GABAPENTIN 600 MG: 600 TABLET, FILM COATED ORAL at 09:05

## 2019-01-01 RX ADMIN — ACETAMINOPHEN 650 MG: 160 SOLUTION ORAL at 01:34

## 2019-01-01 RX ADMIN — Medication 10 ML: at 08:31

## 2019-01-01 RX ADMIN — ACETAMINOPHEN 650 MG: 325 TABLET, FILM COATED ORAL at 20:15

## 2019-01-01 RX ADMIN — FORMOTEROL FUMARATE DIHYDRATE 20 MCG: 20 SOLUTION RESPIRATORY (INHALATION) at 20:30

## 2019-01-01 RX ADMIN — HYDRALAZINE HYDROCHLORIDE 10 MG: 20 INJECTION, SOLUTION INTRAMUSCULAR; INTRAVENOUS at 23:35

## 2019-01-01 RX ADMIN — SUCRALFATE 1 G: 1 TABLET ORAL at 08:25

## 2019-01-01 RX ADMIN — SODIUM CHLORIDE 250 ML: 9 INJECTION, SOLUTION INTRAVENOUS at 13:32

## 2019-01-01 RX ADMIN — BUDESONIDE 500 MCG: 0.5 SUSPENSION RESPIRATORY (INHALATION) at 08:20

## 2019-01-01 RX ADMIN — Medication 2 G: at 01:05

## 2019-01-01 RX ADMIN — LORAZEPAM 0.5 MG: 2 INJECTION INTRAMUSCULAR; INTRAVENOUS at 01:47

## 2019-01-01 RX ADMIN — SUCRALFATE 1 G: 1 TABLET ORAL at 13:27

## 2019-01-01 RX ADMIN — SENNOSIDES A AND B 5 ML: 415.36 LIQUID ORAL at 20:21

## 2019-01-01 RX ADMIN — LEVETIRACETAM 500 MG: 500 TABLET, FILM COATED ORAL at 08:58

## 2019-01-01 RX ADMIN — GABAPENTIN 600 MG: 600 TABLET, FILM COATED ORAL at 14:01

## 2019-01-01 RX ADMIN — FORMOTEROL FUMARATE DIHYDRATE 20 MCG: 20 SOLUTION RESPIRATORY (INHALATION) at 08:28

## 2019-01-01 RX ADMIN — SUCRALFATE 1 G: 1 TABLET ORAL at 07:59

## 2019-01-01 RX ADMIN — AMPICILLIN SODIUM AND SULBACTAM SODIUM 3 G: 2; 1 INJECTION, POWDER, FOR SOLUTION INTRAMUSCULAR; INTRAVENOUS at 14:28

## 2019-01-01 RX ADMIN — ACETAMINOPHEN 650 MG: 650 SUPPOSITORY RECTAL at 08:19

## 2019-01-01 RX ADMIN — HYDRALAZINE HYDROCHLORIDE 10 MG: 20 INJECTION INTRAMUSCULAR; INTRAVENOUS at 22:05

## 2019-01-01 RX ADMIN — PANTOPRAZOLE SODIUM 40 MG: 40 TABLET, DELAYED RELEASE ORAL at 06:45

## 2019-01-01 RX ADMIN — LORAZEPAM 0.5 MG: 2 INJECTION INTRAMUSCULAR; INTRAVENOUS at 11:11

## 2019-01-01 RX ADMIN — Medication 10 ML: at 21:21

## 2019-01-01 RX ADMIN — POLYETHYLENE GLYCOL 3350 17 G: 17 POWDER, FOR SOLUTION ORAL at 11:36

## 2019-01-01 RX ADMIN — FLUVOXAMINE MALEATE 200 MG: 50 TABLET, COATED ORAL at 20:10

## 2019-01-01 RX ADMIN — HYDRALAZINE HYDROCHLORIDE 10 MG: 20 INJECTION INTRAMUSCULAR; INTRAVENOUS at 02:38

## 2019-01-01 RX ADMIN — SUCRALFATE 1 G: 1 TABLET ORAL at 20:10

## 2019-01-01 RX ADMIN — ESCITALOPRAM OXALATE 5 MG: 10 TABLET, FILM COATED ORAL at 08:16

## 2019-01-01 RX ADMIN — LORAZEPAM 0.5 MG: 2 INJECTION INTRAMUSCULAR; INTRAVENOUS at 06:09

## 2019-01-01 RX ADMIN — SODIUM CHLORIDE 25 ML/HR: 3 INJECTION, SOLUTION INTRAVENOUS at 08:14

## 2019-01-01 RX ADMIN — FAMOTIDINE 20 MG: 20 TABLET ORAL at 20:41

## 2019-01-01 RX ADMIN — SUCRALFATE 1 G: 1 TABLET ORAL at 14:13

## 2019-01-01 RX ADMIN — POLYVINYL ALCOHOL 1 DROP: 14 SOLUTION/ DROPS OPHTHALMIC at 11:11

## 2019-01-01 RX ADMIN — GABAPENTIN 600 MG: 600 TABLET, FILM COATED ORAL at 20:28

## 2019-01-01 RX ADMIN — ACETAMINOPHEN 650 MG: 650 SOLUTION ORAL at 00:33

## 2019-01-01 RX ADMIN — POTASSIUM BICARBONATE 60 MEQ: 782 TABLET, EFFERVESCENT ORAL at 06:22

## 2019-01-01 RX ADMIN — Medication 10 ML: at 09:06

## 2019-01-01 RX ADMIN — ESCITALOPRAM OXALATE 10 MG: 10 TABLET, FILM COATED ORAL at 08:58

## 2019-01-01 RX ADMIN — ENOXAPARIN SODIUM 30 MG: 30 INJECTION SUBCUTANEOUS at 09:34

## 2019-01-01 RX ADMIN — PROPOFOL 20 MCG/KG/MIN: 10 INJECTION, EMULSION INTRAVENOUS at 14:13

## 2019-01-01 RX ADMIN — QUETIAPINE FUMARATE 400 MG: 100 TABLET ORAL at 20:45

## 2019-01-01 RX ADMIN — LACTULOSE 20 G: 20 SOLUTION ORAL at 21:20

## 2019-01-01 RX ADMIN — MINERAL OIL AND WHITE PETROLATUM: 150; 830 OINTMENT OPHTHALMIC at 21:33

## 2019-01-01 RX ADMIN — DIVALPROEX SODIUM 500 MG: 500 TABLET, EXTENDED RELEASE ORAL at 09:03

## 2019-01-01 RX ADMIN — Medication 10 ML: at 08:25

## 2019-01-01 RX ADMIN — SENNOSIDES A AND B 5 ML: 415.36 LIQUID ORAL at 20:41

## 2019-01-01 RX ADMIN — FORMOTEROL FUMARATE DIHYDRATE 20 MCG: 20 SOLUTION RESPIRATORY (INHALATION) at 05:28

## 2019-01-01 RX ADMIN — BUDESONIDE 500 MCG: 0.5 SUSPENSION RESPIRATORY (INHALATION) at 20:01

## 2019-01-01 RX ADMIN — SODIUM CHLORIDE TAB 1 GM 2 G: 1 TAB at 18:11

## 2019-01-01 RX ADMIN — HYDROCODONE BITARTRATE AND ACETAMINOPHEN 2 TABLET: 5; 325 TABLET ORAL at 02:55

## 2019-01-01 RX ADMIN — Medication 2 G: at 09:02

## 2019-01-01 RX ADMIN — QUETIAPINE FUMARATE 300 MG: 300 TABLET ORAL at 20:44

## 2019-01-01 RX ADMIN — MORPHINE SULFATE 2 MG: 2 INJECTION, SOLUTION INTRAMUSCULAR; INTRAVENOUS at 14:36

## 2019-01-01 RX ADMIN — GLYCOPYRROLATE 0.2 MG: 0.2 INJECTION, SOLUTION INTRAMUSCULAR; INTRAVENOUS at 14:35

## 2019-01-01 RX ADMIN — AMPICILLIN SODIUM AND SULBACTAM SODIUM 3 G: 2; 1 INJECTION, POWDER, FOR SOLUTION INTRAMUSCULAR; INTRAVENOUS at 08:16

## 2019-01-01 RX ADMIN — CHLORHEXIDINE GLUCONATE 0.12% ORAL RINSE 15 ML: 1.2 LIQUID ORAL at 07:48

## 2019-01-01 RX ADMIN — SUCRALFATE 1 G: 1 TABLET ORAL at 08:27

## 2019-01-01 RX ADMIN — DOCUSATE SODIUM 100 MG: 50 LIQUID ORAL at 08:27

## 2019-01-01 RX ADMIN — MOMETASONE FUROATE AND FORMOTEROL FUMARATE DIHYDRATE 2 PUFF: 200; 5 AEROSOL RESPIRATORY (INHALATION) at 21:47

## 2019-01-01 RX ADMIN — ACETAMINOPHEN 650 MG: 160 SOLUTION ORAL at 20:26

## 2019-01-01 RX ADMIN — BUDESONIDE 500 MCG: 0.5 SUSPENSION RESPIRATORY (INHALATION) at 20:31

## 2019-01-01 RX ADMIN — GABAPENTIN 600 MG: 300 CAPSULE ORAL at 08:09

## 2019-01-01 RX ADMIN — SENNOSIDES A AND B 5 ML: 415.36 LIQUID ORAL at 21:32

## 2019-01-01 RX ADMIN — LEVETIRACETAM 500 MG: 5 INJECTION INTRAVENOUS at 12:18

## 2019-01-01 RX ADMIN — CEFAZOLIN SODIUM 1 G: 1 SOLUTION INTRAVENOUS at 16:35

## 2019-01-01 RX ADMIN — POTASSIUM, SODIUM PHOSPHATES 280 MG-160 MG-250 MG ORAL POWDER PACKET 250 MG: POWDER IN PACKET at 08:24

## 2019-01-01 RX ADMIN — GABAPENTIN 400 MG: 400 CAPSULE ORAL at 20:30

## 2019-01-01 RX ADMIN — MOMETASONE FUROATE AND FORMOTEROL FUMARATE DIHYDRATE 2 PUFF: 200; 5 AEROSOL RESPIRATORY (INHALATION) at 09:05

## 2019-01-01 RX ADMIN — AMPICILLIN SODIUM AND SULBACTAM SODIUM 3 G: 2; 1 INJECTION, POWDER, FOR SOLUTION INTRAMUSCULAR; INTRAVENOUS at 20:11

## 2019-01-01 RX ADMIN — DOCUSATE SODIUM 100 MG: 50 LIQUID ORAL at 08:12

## 2019-01-01 RX ADMIN — Medication 10 ML: at 20:25

## 2019-01-01 RX ADMIN — Medication 10 ML: at 09:41

## 2019-01-01 RX ADMIN — SUCRALFATE 1 G: 1 TABLET ORAL at 15:18

## 2019-01-01 RX ADMIN — BUDESONIDE 500 MCG: 0.5 SUSPENSION RESPIRATORY (INHALATION) at 19:32

## 2019-01-01 RX ADMIN — BACLOFEN 10 MG: 10 TABLET ORAL at 08:46

## 2019-01-01 RX ADMIN — LEVETIRACETAM 500 MG: 5 INJECTION INTRAVENOUS at 12:35

## 2019-01-01 RX ADMIN — Medication 10 ML: at 20:56

## 2019-01-01 RX ADMIN — HYDROCODONE BITARTRATE AND ACETAMINOPHEN 2 TABLET: 5; 325 TABLET ORAL at 09:56

## 2019-01-01 RX ADMIN — ACETAMINOPHEN 650 MG: 650 SUPPOSITORY RECTAL at 04:00

## 2019-01-01 RX ADMIN — CHLORHEXIDINE GLUCONATE 0.12% ORAL RINSE 15 ML: 1.2 LIQUID ORAL at 21:00

## 2019-01-01 RX ADMIN — SUCRALFATE 1 G: 1 TABLET ORAL at 11:35

## 2019-01-01 RX ADMIN — ESCITALOPRAM OXALATE 10 MG: 10 TABLET, FILM COATED ORAL at 17:52

## 2019-01-01 RX ADMIN — MORPHINE SULFATE 2 MG: 4 INJECTION INTRAVENOUS at 10:15

## 2019-01-01 RX ADMIN — FAMOTIDINE 20 MG: 20 TABLET ORAL at 21:21

## 2019-01-01 RX ADMIN — HYDROCODONE BITARTRATE AND ACETAMINOPHEN 2 TABLET: 5; 325 TABLET ORAL at 23:24

## 2019-01-01 RX ADMIN — FLUDROCORTISONE ACETATE 0.1 MG: 0.1 TABLET ORAL at 08:16

## 2019-01-01 RX ADMIN — GABAPENTIN 600 MG: 300 CAPSULE ORAL at 14:14

## 2019-01-01 RX ADMIN — SUCRALFATE 1 G: 1 TABLET ORAL at 13:34

## 2019-01-01 RX ADMIN — Medication 30 MCG/MIN: at 12:28

## 2019-01-01 RX ADMIN — ACETAMINOPHEN 650 MG: 650 SUPPOSITORY RECTAL at 20:15

## 2019-01-01 RX ADMIN — FORMOTEROL FUMARATE DIHYDRATE 20 MCG: 20 SOLUTION RESPIRATORY (INHALATION) at 20:02

## 2019-01-01 RX ADMIN — GABAPENTIN 600 MG: 300 CAPSULE ORAL at 21:22

## 2019-01-01 RX ADMIN — LEVETIRACETAM 500 MG: 5 INJECTION INTRAVENOUS at 02:19

## 2019-01-01 RX ADMIN — Medication 10 ML: at 20:24

## 2019-01-01 RX ADMIN — FAMOTIDINE 20 MG: 20 TABLET ORAL at 20:40

## 2019-01-01 RX ADMIN — LEVETIRACETAM 500 MG: 5 INJECTION INTRAVENOUS at 14:00

## 2019-01-01 RX ADMIN — LEVETIRACETAM 500 MG: 5 INJECTION INTRAVENOUS at 00:52

## 2019-01-01 RX ADMIN — DIVALPROEX SODIUM 500 MG: 500 TABLET, EXTENDED RELEASE ORAL at 21:22

## 2019-01-01 RX ADMIN — POTASSIUM CHLORIDE 10 MEQ: 10 INJECTION, SOLUTION INTRAVENOUS at 08:13

## 2019-01-01 RX ADMIN — ESCITALOPRAM OXALATE 10 MG: 10 TABLET, FILM COATED ORAL at 08:09

## 2019-01-01 RX ADMIN — SODIUM CHLORIDE TAB 1 GM 2 G: 1 TAB at 18:00

## 2019-01-01 RX ADMIN — HYDROCODONE BITARTRATE AND ACETAMINOPHEN 2 TABLET: 5; 325 TABLET ORAL at 13:34

## 2019-01-01 RX ADMIN — SUCRALFATE 1 G: 1 TABLET ORAL at 20:42

## 2019-01-01 RX ADMIN — BUDESONIDE 500 MCG: 0.5 SUSPENSION RESPIRATORY (INHALATION) at 20:18

## 2019-01-01 RX ADMIN — DOCUSATE SODIUM 100 MG: 100 CAPSULE, LIQUID FILLED ORAL at 09:21

## 2019-01-01 RX ADMIN — SUCRALFATE 1 G: 1 TABLET ORAL at 08:32

## 2019-01-01 RX ADMIN — LOSARTAN POTASSIUM 25 MG: 25 TABLET, FILM COATED ORAL at 08:24

## 2019-01-01 RX ADMIN — BACLOFEN 10 MG: 10 TABLET ORAL at 09:21

## 2019-01-01 RX ADMIN — Medication 10 ML: at 19:39

## 2019-01-01 RX ADMIN — SODIUM CHLORIDE 25 ML/HR: 3 INJECTION, SOLUTION INTRAVENOUS at 20:39

## 2019-01-01 RX ADMIN — Medication 2 G: at 16:45

## 2019-01-01 RX ADMIN — ESCITALOPRAM OXALATE 5 MG: 10 TABLET, FILM COATED ORAL at 08:30

## 2019-01-01 RX ADMIN — MINERAL OIL AND WHITE PETROLATUM: 150; 830 OINTMENT OPHTHALMIC at 16:20

## 2019-01-01 RX ADMIN — Medication 10 ML: at 20:28

## 2019-01-01 RX ADMIN — CHLORHEXIDINE GLUCONATE 0.12% ORAL RINSE 15 ML: 1.2 LIQUID ORAL at 20:21

## 2019-01-01 RX ADMIN — CHLORHEXIDINE GLUCONATE 0.12% ORAL RINSE 15 ML: 1.2 LIQUID ORAL at 21:32

## 2019-01-01 RX ADMIN — ACETAMINOPHEN 650 MG: 160 SOLUTION ORAL at 18:12

## 2019-01-01 RX ADMIN — LABETALOL HYDROCHLORIDE 10 MG: 5 INJECTION INTRAVENOUS at 15:51

## 2019-01-01 RX ADMIN — LORAZEPAM 0.5 MG: 2 INJECTION INTRAMUSCULAR; INTRAVENOUS at 04:19

## 2019-01-01 RX ADMIN — POTASSIUM PHOSPHATE, MONOBASIC AND POTASSIUM PHOSPHATE, DIBASIC 30 MMOL: 224; 236 INJECTION, SOLUTION, CONCENTRATE INTRAVENOUS at 08:08

## 2019-01-01 RX ADMIN — FORMOTEROL FUMARATE DIHYDRATE 20 MCG: 20 SOLUTION RESPIRATORY (INHALATION) at 20:52

## 2019-01-01 RX ADMIN — SUCRALFATE 1 G: 1 TABLET ORAL at 09:55

## 2019-01-01 RX ADMIN — BACLOFEN 10 MG: 10 TABLET ORAL at 22:13

## 2019-01-01 RX ADMIN — SUCRALFATE 1 G: 1 TABLET ORAL at 13:07

## 2019-01-01 RX ADMIN — SODIUM CHLORIDE TAB 1 GM 2 G: 1 TAB at 08:00

## 2019-01-01 RX ADMIN — SODIUM CHLORIDE 25 ML/HR: 3 INJECTION, SOLUTION INTRAVENOUS at 14:12

## 2019-01-01 RX ADMIN — ROCURONIUM BROMIDE 40 MG: 10 INJECTION, SOLUTION INTRAVENOUS at 08:26

## 2019-01-01 RX ADMIN — SUCRALFATE 1 G: 1 TABLET ORAL at 08:58

## 2019-01-01 RX ADMIN — BUDESONIDE 500 MCG: 0.5 SUSPENSION RESPIRATORY (INHALATION) at 08:29

## 2019-01-01 RX ADMIN — SUCRALFATE 1 G: 1 TABLET ORAL at 13:13

## 2019-01-01 RX ADMIN — HYDROCODONE BITARTRATE AND ACETAMINOPHEN 2 TABLET: 5; 325 TABLET ORAL at 08:09

## 2019-01-01 RX ADMIN — POTASSIUM, SODIUM PHOSPHATES 280 MG-160 MG-250 MG ORAL POWDER PACKET 250 MG: POWDER IN PACKET at 13:07

## 2019-01-01 RX ADMIN — FORMOTEROL FUMARATE DIHYDRATE 20 MCG: 20 SOLUTION RESPIRATORY (INHALATION) at 20:29

## 2019-01-01 RX ADMIN — Medication 2 G: at 17:31

## 2019-01-01 RX ADMIN — ACETAMINOPHEN 650 MG: 160 SOLUTION ORAL at 03:11

## 2019-01-01 RX ADMIN — QUETIAPINE FUMARATE 400 MG: 100 TABLET ORAL at 21:22

## 2019-01-01 RX ADMIN — FAMOTIDINE 20 MG: 20 TABLET ORAL at 08:15

## 2019-01-01 RX ADMIN — HYDROCODONE BITARTRATE AND ACETAMINOPHEN 2 TABLET: 5; 325 TABLET ORAL at 14:00

## 2019-01-01 RX ADMIN — FUROSEMIDE 20 MG: 10 INJECTION, SOLUTION INTRAVENOUS at 08:43

## 2019-01-01 RX ADMIN — HYDROCODONE BITARTRATE AND ACETAMINOPHEN 2 TABLET: 5; 325 TABLET ORAL at 01:59

## 2019-01-01 RX ADMIN — MORPHINE SULFATE 2 MG/HR: 10 INJECTION INTRAVENOUS at 13:02

## 2019-01-01 RX ADMIN — SENNOSIDES A AND B 5 ML: 415.36 LIQUID ORAL at 20:26

## 2019-01-01 RX ADMIN — HYDROCODONE BITARTRATE AND ACETAMINOPHEN 2 TABLET: 5; 325 TABLET ORAL at 09:55

## 2019-01-01 RX ADMIN — Medication 10 ML: at 20:42

## 2019-01-01 RX ADMIN — DIVALPROEX SODIUM 500 MG: 500 TABLET, EXTENDED RELEASE ORAL at 08:47

## 2019-01-01 RX ADMIN — GLYCOPYRROLATE 0.2 MG: 0.2 INJECTION, SOLUTION INTRAMUSCULAR; INTRAVENOUS at 20:31

## 2019-01-01 RX ADMIN — MORPHINE SULFATE 2 MG: 2 INJECTION, SOLUTION INTRAMUSCULAR; INTRAVENOUS at 08:09

## 2019-01-01 RX ADMIN — AMPICILLIN SODIUM AND SULBACTAM SODIUM 3 G: 2; 1 INJECTION, POWDER, FOR SOLUTION INTRAMUSCULAR; INTRAVENOUS at 01:30

## 2019-01-01 RX ADMIN — SODIUM CHLORIDE TAB 1 GM 2 G: 1 TAB at 14:09

## 2019-01-01 RX ADMIN — GABAPENTIN 400 MG: 400 CAPSULE ORAL at 21:21

## 2019-01-01 RX ADMIN — Medication 10 ML: at 11:37

## 2019-01-01 RX ADMIN — GABAPENTIN 400 MG: 400 CAPSULE ORAL at 07:59

## 2019-01-01 RX ADMIN — LEVETIRACETAM 500 MG: 5 INJECTION INTRAVENOUS at 13:06

## 2019-01-01 RX ADMIN — FENTANYL CITRATE 50 MCG: 50 INJECTION, SOLUTION INTRAMUSCULAR; INTRAVENOUS at 10:16

## 2019-01-01 RX ADMIN — SUCRALFATE 1 G: 1 TABLET ORAL at 07:48

## 2019-01-01 RX ADMIN — AMPICILLIN SODIUM AND SULBACTAM SODIUM 3 G: 2; 1 INJECTION, POWDER, FOR SOLUTION INTRAMUSCULAR; INTRAVENOUS at 14:14

## 2019-01-01 RX ADMIN — FLUDROCORTISONE ACETATE 0.1 MG: 0.1 TABLET ORAL at 11:36

## 2019-01-01 RX ADMIN — FORMOTEROL FUMARATE DIHYDRATE 20 MCG: 20 SOLUTION RESPIRATORY (INHALATION) at 20:06

## 2019-01-01 RX ADMIN — SUCRALFATE 1 G: 1 TABLET ORAL at 08:09

## 2019-01-01 RX ADMIN — GABAPENTIN 400 MG: 400 CAPSULE ORAL at 07:48

## 2019-01-01 RX ADMIN — MINERAL OIL AND WHITE PETROLATUM: 150; 830 OINTMENT OPHTHALMIC at 14:28

## 2019-01-01 RX ADMIN — SODIUM CHLORIDE TAB 1 GM 2 G: 1 TAB at 17:42

## 2019-01-01 RX ADMIN — CHLORHEXIDINE GLUCONATE 0.12% ORAL RINSE 15 ML: 1.2 LIQUID ORAL at 08:15

## 2019-01-01 RX ADMIN — LOSARTAN POTASSIUM 25 MG: 25 TABLET, FILM COATED ORAL at 07:48

## 2019-01-01 RX ADMIN — FORMOTEROL FUMARATE DIHYDRATE 20 MCG: 20 SOLUTION RESPIRATORY (INHALATION) at 05:42

## 2019-01-01 RX ADMIN — LEVETIRACETAM 500 MG: 500 SOLUTION ORAL at 08:32

## 2019-01-01 RX ADMIN — ROSUVASTATIN CALCIUM 5 MG: 5 TABLET, FILM COATED ORAL at 15:17

## 2019-01-01 RX ADMIN — POLYVINYL ALCOHOL 1 DROP: 14 SOLUTION/ DROPS OPHTHALMIC at 20:44

## 2019-01-01 RX ADMIN — SUCRALFATE 1 G: 1 TABLET ORAL at 20:40

## 2019-01-01 RX ADMIN — FENTANYL CITRATE 50 MCG: 50 INJECTION, SOLUTION INTRAMUSCULAR; INTRAVENOUS at 09:41

## 2019-01-01 RX ADMIN — BUDESONIDE 500 MCG: 0.5 SUSPENSION RESPIRATORY (INHALATION) at 08:07

## 2019-01-01 RX ADMIN — FAMOTIDINE 20 MG: 10 INJECTION, SOLUTION INTRAVENOUS at 20:25

## 2019-01-01 RX ADMIN — GABAPENTIN 600 MG: 600 TABLET, FILM COATED ORAL at 14:00

## 2019-01-01 RX ADMIN — Medication 10 ML: at 08:09

## 2019-01-01 RX ADMIN — Medication 10 ML: at 08:27

## 2019-01-01 RX ADMIN — QUETIAPINE FUMARATE 300 MG: 300 TABLET ORAL at 20:25

## 2019-01-01 RX ADMIN — SUCRALFATE 1 G: 1 TABLET ORAL at 08:46

## 2019-01-01 RX ADMIN — SODIUM CHLORIDE TAB 1 GM 2 G: 1 TAB at 16:55

## 2019-01-01 RX ADMIN — ESCITALOPRAM OXALATE 5 MG: 10 TABLET, FILM COATED ORAL at 11:35

## 2019-01-01 RX ADMIN — Medication 2 G: at 09:50

## 2019-01-01 RX ADMIN — BUDESONIDE 500 MCG: 0.5 SUSPENSION RESPIRATORY (INHALATION) at 20:09

## 2019-01-01 RX ADMIN — SUCRALFATE 1 G: 1 TABLET ORAL at 22:06

## 2019-01-01 RX ADMIN — SODIUM CHLORIDE TAB 1 GM 2 G: 1 TAB at 12:15

## 2019-01-01 RX ADMIN — MIDAZOLAM HYDROCHLORIDE 2 MG: 2 INJECTION, SOLUTION INTRAMUSCULAR; INTRAVENOUS at 11:52

## 2019-01-01 RX ADMIN — SUCCINYLCHOLINE CHLORIDE 100 MG: 20 INJECTION, SOLUTION INTRAMUSCULAR; INTRAVENOUS; PARENTERAL at 05:32

## 2019-01-01 RX ADMIN — POLYETHYLENE GLYCOL 3350 17 G: 17 POWDER, FOR SOLUTION ORAL at 14:12

## 2019-01-01 RX ADMIN — SUCRALFATE 1 G: 1 TABLET ORAL at 13:36

## 2019-01-01 RX ADMIN — LABETALOL HYDROCHLORIDE 10 MG: 5 INJECTION INTRAVENOUS at 21:43

## 2019-01-01 RX ADMIN — HYDROCODONE BITARTRATE AND ACETAMINOPHEN 2 TABLET: 5; 325 TABLET ORAL at 18:58

## 2019-01-01 RX ADMIN — QUETIAPINE FUMARATE 300 MG: 300 TABLET ORAL at 20:54

## 2019-01-01 RX ADMIN — HYDROCODONE BITARTRATE AND ACETAMINOPHEN 2 TABLET: 5; 325 TABLET ORAL at 17:06

## 2019-01-01 RX ADMIN — QUETIAPINE FUMARATE 300 MG: 300 TABLET ORAL at 21:20

## 2019-01-01 RX ADMIN — CHLORHEXIDINE GLUCONATE 0.12% ORAL RINSE 15 ML: 1.2 LIQUID ORAL at 20:41

## 2019-01-01 RX ADMIN — GABAPENTIN 400 MG: 400 CAPSULE ORAL at 14:12

## 2019-01-01 RX ADMIN — LEVETIRACETAM 500 MG: 500 TABLET, FILM COATED ORAL at 08:46

## 2019-01-01 RX ADMIN — ACETAMINOPHEN 650 MG: 160 SOLUTION ORAL at 05:00

## 2019-01-01 RX ADMIN — MORPHINE SULFATE 2 MG: 2 INJECTION, SOLUTION INTRAMUSCULAR; INTRAVENOUS at 03:34

## 2019-01-01 RX ADMIN — LEVETIRACETAM 500 MG: 500 TABLET, FILM COATED ORAL at 08:09

## 2019-01-01 RX ADMIN — GABAPENTIN 400 MG: 400 CAPSULE ORAL at 15:19

## 2019-01-01 RX ADMIN — SODIUM CHLORIDE TAB 1 GM 2 G: 1 TAB at 12:30

## 2019-01-01 RX ADMIN — HYDROCODONE BITARTRATE AND ACETAMINOPHEN 2 TABLET: 5; 325 TABLET ORAL at 21:23

## 2019-01-01 RX ADMIN — FAMOTIDINE 20 MG: 10 INJECTION, SOLUTION INTRAVENOUS at 21:32

## 2019-01-01 RX ADMIN — SODIUM CHLORIDE TAB 1 GM 2 G: 1 TAB at 12:14

## 2019-01-01 RX ADMIN — LACTULOSE 20 G: 20 SOLUTION ORAL at 20:24

## 2019-01-01 RX ADMIN — POTASSIUM, SODIUM PHOSPHATES 280 MG-160 MG-250 MG ORAL POWDER PACKET 250 MG: POWDER IN PACKET at 16:07

## 2019-01-01 RX ADMIN — FAMOTIDINE 20 MG: 10 INJECTION, SOLUTION INTRAVENOUS at 08:24

## 2019-01-01 RX ADMIN — LEVETIRACETAM 500 MG: 5 INJECTION INTRAVENOUS at 14:02

## 2019-01-01 RX ADMIN — LOSARTAN POTASSIUM 25 MG: 25 TABLET, FILM COATED ORAL at 08:30

## 2019-01-01 RX ADMIN — SUCRALFATE 1 G: 1 TABLET ORAL at 20:28

## 2019-01-01 RX ADMIN — FAMOTIDINE 20 MG: 20 TABLET ORAL at 07:58

## 2019-01-01 RX ADMIN — DEXAMETHASONE SODIUM PHOSPHATE 10 MG: 10 INJECTION INTRAMUSCULAR; INTRAVENOUS at 08:33

## 2019-01-01 RX ADMIN — HYDROCODONE BITARTRATE AND ACETAMINOPHEN 2 TABLET: 5; 325 TABLET ORAL at 05:23

## 2019-01-01 RX ADMIN — BACLOFEN 10 MG: 10 TABLET ORAL at 20:29

## 2019-01-01 RX ADMIN — POLYETHYLENE GLYCOL 3350 17 G: 17 POWDER, FOR SOLUTION ORAL at 08:16

## 2019-01-01 RX ADMIN — MIDAZOLAM HYDROCHLORIDE 2 MG: 2 INJECTION, SOLUTION INTRAMUSCULAR; INTRAVENOUS at 09:53

## 2019-01-01 RX ADMIN — GABAPENTIN 400 MG: 400 CAPSULE ORAL at 20:35

## 2019-01-01 RX ADMIN — SUCRALFATE 1 G: 1 TABLET ORAL at 14:14

## 2019-01-01 RX ADMIN — AMPICILLIN SODIUM AND SULBACTAM SODIUM 3 G: 2; 1 INJECTION, POWDER, FOR SOLUTION INTRAMUSCULAR; INTRAVENOUS at 08:21

## 2019-01-01 RX ADMIN — MOMETASONE FUROATE AND FORMOTEROL FUMARATE DIHYDRATE 2 PUFF: 200; 5 AEROSOL RESPIRATORY (INHALATION) at 21:27

## 2019-01-01 RX ADMIN — SODIUM CHLORIDE: 9 INJECTION, SOLUTION INTRAVENOUS at 16:00

## 2019-01-01 RX ADMIN — AMPICILLIN SODIUM AND SULBACTAM SODIUM 3 G: 2; 1 INJECTION, POWDER, FOR SOLUTION INTRAMUSCULAR; INTRAVENOUS at 14:12

## 2019-01-01 RX ADMIN — SODIUM CHLORIDE: 900 INJECTION INTRAVENOUS at 08:13

## 2019-01-01 RX ADMIN — POTASSIUM PHOSPHATE, MONOBASIC AND POTASSIUM PHOSPHATE, DIBASIC 30 MMOL: 224; 236 INJECTION, SOLUTION, CONCENTRATE INTRAVENOUS at 11:11

## 2019-01-01 RX ADMIN — ACETAMINOPHEN 650 MG: 160 SOLUTION ORAL at 04:14

## 2019-01-01 RX ADMIN — GABAPENTIN 400 MG: 400 CAPSULE ORAL at 08:15

## 2019-01-01 RX ADMIN — SUCRALFATE 1 G: 1 TABLET ORAL at 22:14

## 2019-01-01 RX ADMIN — CHLORHEXIDINE GLUCONATE 0.12% ORAL RINSE 15 ML: 1.2 LIQUID ORAL at 20:25

## 2019-01-01 RX ADMIN — ROSUVASTATIN CALCIUM 5 MG: 5 TABLET, FILM COATED ORAL at 08:28

## 2019-01-01 RX ADMIN — CHLORHEXIDINE GLUCONATE 0.12% ORAL RINSE 15 ML: 1.2 LIQUID ORAL at 20:40

## 2019-01-01 RX ADMIN — ESCITALOPRAM OXALATE 10 MG: 10 TABLET, FILM COATED ORAL at 08:24

## 2019-01-01 RX ADMIN — LACTULOSE 20 G: 20 SOLUTION ORAL at 08:38

## 2019-01-01 RX ADMIN — BUDESONIDE 500 MCG: 0.5 SUSPENSION RESPIRATORY (INHALATION) at 22:21

## 2019-01-01 RX ADMIN — LORAZEPAM 0.5 MG: 2 INJECTION INTRAMUSCULAR; INTRAVENOUS at 00:23

## 2019-01-01 RX ADMIN — FAMOTIDINE 20 MG: 10 INJECTION, SOLUTION INTRAVENOUS at 21:31

## 2019-01-01 RX ADMIN — LEVETIRACETAM 500 MG: 500 SOLUTION ORAL at 20:54

## 2019-01-01 RX ADMIN — BACLOFEN 10 MG: 10 TABLET ORAL at 08:09

## 2019-01-01 ASSESSMENT — PAIN SCALES - GENERAL
PAINLEVEL_OUTOF10: 8
PAINLEVEL_OUTOF10: 0
PAINLEVEL_OUTOF10: 10
PAINLEVEL_OUTOF10: 5
PAINLEVEL_OUTOF10: 0
PAINLEVEL_OUTOF10: 0
PAINLEVEL_OUTOF10: 7
PAINLEVEL_OUTOF10: 8
PAINLEVEL_OUTOF10: 0
PAINLEVEL_OUTOF10: 9
PAINLEVEL_OUTOF10: 0
PAINLEVEL_OUTOF10: 0
PAINLEVEL_OUTOF10: 9
PAINLEVEL_OUTOF10: 10
PAINLEVEL_OUTOF10: 0
PAINLEVEL_OUTOF10: 9
PAINLEVEL_OUTOF10: 2
PAINLEVEL_OUTOF10: 10
PAINLEVEL_OUTOF10: 0
PAINLEVEL_OUTOF10: 0
PAINLEVEL_OUTOF10: 1
PAINLEVEL_OUTOF10: 0
PAINLEVEL_OUTOF10: 10
PAINLEVEL_OUTOF10: 0
PAINLEVEL_OUTOF10: 3
PAINLEVEL_OUTOF10: 9
PAINLEVEL_OUTOF10: 0
PAINLEVEL_OUTOF10: 5
PAINLEVEL_OUTOF10: 0
PAINLEVEL_OUTOF10: 8
PAINLEVEL_OUTOF10: 0
PAINLEVEL_OUTOF10: 0
PAINLEVEL_OUTOF10: 9
PAINLEVEL_OUTOF10: 0
PAINLEVEL_OUTOF10: 8
PAINLEVEL_OUTOF10: 9
PAINLEVEL_OUTOF10: 9
PAINLEVEL_OUTOF10: 0
PAINLEVEL_OUTOF10: 0
PAINLEVEL_OUTOF10: 6
PAINLEVEL_OUTOF10: 0
PAINLEVEL_OUTOF10: 9
PAINLEVEL_OUTOF10: 0
PAINLEVEL_OUTOF10: 0
PAINLEVEL_OUTOF10: 9
PAINLEVEL_OUTOF10: 0
PAINLEVEL_OUTOF10: 9
PAINLEVEL_OUTOF10: 8
PAINLEVEL_OUTOF10: 0
PAINLEVEL_OUTOF10: 9
PAINLEVEL_OUTOF10: 0
PAINLEVEL_OUTOF10: 0
PAINLEVEL_OUTOF10: 1
PAINLEVEL_OUTOF10: 8
PAINLEVEL_OUTOF10: 10
PAINLEVEL_OUTOF10: 9
PAINLEVEL_OUTOF10: 0
PAINLEVEL_OUTOF10: 0
PAINLEVEL_OUTOF10: 10
PAINLEVEL_OUTOF10: 0
PAINLEVEL_OUTOF10: 9
PAINLEVEL_OUTOF10: 10
PAINLEVEL_OUTOF10: 0
PAINLEVEL_OUTOF10: 0
PAINLEVEL_OUTOF10: 9
PAINLEVEL_OUTOF10: 0
PAINLEVEL_OUTOF10: 2
PAINLEVEL_OUTOF10: 10
PAINLEVEL_OUTOF10: 3
PAINLEVEL_OUTOF10: 0
PAINLEVEL_OUTOF10: 0
PAINLEVEL_OUTOF10: 9
PAINLEVEL_OUTOF10: 8
PAINLEVEL_OUTOF10: 0
PAINLEVEL_OUTOF10: 0
PAINLEVEL_OUTOF10: 5
PAINLEVEL_OUTOF10: 0
PAINLEVEL_OUTOF10: 10
PAINLEVEL_OUTOF10: 0
PAINLEVEL_OUTOF10: 9
PAINLEVEL_OUTOF10: 0
PAINLEVEL_OUTOF10: 0

## 2019-01-01 ASSESSMENT — PULMONARY FUNCTION TESTS
PIF_VALUE: 18
PIF_VALUE: 19
PIF_VALUE: 17
PIF_VALUE: 17
PIF_VALUE: 16
PIF_VALUE: 19
PIF_VALUE: 19
PIF_VALUE: 15
PIF_VALUE: 18
PIF_VALUE: 21
PIF_VALUE: 20
PIF_VALUE: 18
PIF_VALUE: 19
PIF_VALUE: 19
PIF_VALUE: 20
PIF_VALUE: 19
PIF_VALUE: 17
PIF_VALUE: 21
PIF_VALUE: 20
PIF_VALUE: 17
PIF_VALUE: 21
PIF_VALUE: 15
PIF_VALUE: 19
PIF_VALUE: 25
PIF_VALUE: 20
PIF_VALUE: 21
PIF_VALUE: 20
PIF_VALUE: 18
PIF_VALUE: 17
PIF_VALUE: 19
PIF_VALUE: 19
PIF_VALUE: 21
PIF_VALUE: 17
PIF_VALUE: 20
PIF_VALUE: 34
PIF_VALUE: 19
PIF_VALUE: 22
PIF_VALUE: 21
PIF_VALUE: 15
PIF_VALUE: 21
PIF_VALUE: 18
PIF_VALUE: 30
PIF_VALUE: 21
PIF_VALUE: 15
PIF_VALUE: 33
PIF_VALUE: 19
PIF_VALUE: 21
PIF_VALUE: 34
PIF_VALUE: 21
PIF_VALUE: 21
PIF_VALUE: 22
PIF_VALUE: 19
PIF_VALUE: 16
PIF_VALUE: 21
PIF_VALUE: 15
PIF_VALUE: 20
PIF_VALUE: 19
PIF_VALUE: 15
PIF_VALUE: 1
PIF_VALUE: 21
PIF_VALUE: 19
PIF_VALUE: 19
PIF_VALUE: 21
PIF_VALUE: 11
PIF_VALUE: 21
PIF_VALUE: 24
PIF_VALUE: 22
PIF_VALUE: 19
PIF_VALUE: 18
PIF_VALUE: 19
PIF_VALUE: 17
PIF_VALUE: 21
PIF_VALUE: 20
PIF_VALUE: 15
PIF_VALUE: 20
PIF_VALUE: 17
PIF_VALUE: 19
PIF_VALUE: 21
PIF_VALUE: 22
PIF_VALUE: 23
PIF_VALUE: 18
PIF_VALUE: 17
PIF_VALUE: 22
PIF_VALUE: 31
PIF_VALUE: 20
PIF_VALUE: 16
PIF_VALUE: 22
PIF_VALUE: 28
PIF_VALUE: 16
PIF_VALUE: 20
PIF_VALUE: 20
PIF_VALUE: 2
PIF_VALUE: 22
PIF_VALUE: 17
PIF_VALUE: 16
PIF_VALUE: 19
PIF_VALUE: 20
PIF_VALUE: 20
PIF_VALUE: 15
PIF_VALUE: 18
PIF_VALUE: 21
PIF_VALUE: 35
PIF_VALUE: 17
PIF_VALUE: 21
PIF_VALUE: 29
PIF_VALUE: 19
PIF_VALUE: 20
PIF_VALUE: 17
PIF_VALUE: 19
PIF_VALUE: 20
PIF_VALUE: 21
PIF_VALUE: 18
PIF_VALUE: 22
PIF_VALUE: 19
PIF_VALUE: 29
PIF_VALUE: 22
PIF_VALUE: 21
PIF_VALUE: 20
PIF_VALUE: 21
PIF_VALUE: 19
PIF_VALUE: 18
PIF_VALUE: 20
PIF_VALUE: 19
PIF_VALUE: 15
PIF_VALUE: 17
PIF_VALUE: 19
PIF_VALUE: 22
PIF_VALUE: 22
PIF_VALUE: 20
PIF_VALUE: 15
PIF_VALUE: 20
PIF_VALUE: 20
PIF_VALUE: 21
PIF_VALUE: 22
PIF_VALUE: 20
PIF_VALUE: 20
PIF_VALUE: 22
PIF_VALUE: 22
PIF_VALUE: 17
PIF_VALUE: 16
PIF_VALUE: 21
PIF_VALUE: 19
PIF_VALUE: 19
PIF_VALUE: 15
PIF_VALUE: 19
PIF_VALUE: 20
PIF_VALUE: 16
PIF_VALUE: 21
PIF_VALUE: 20
PIF_VALUE: 15
PIF_VALUE: 20
PIF_VALUE: 21
PIF_VALUE: 15
PIF_VALUE: 18
PIF_VALUE: 28
PIF_VALUE: 21
PIF_VALUE: 22
PIF_VALUE: 18
PIF_VALUE: 19
PIF_VALUE: 20
PIF_VALUE: 21
PIF_VALUE: 16
PIF_VALUE: 23
PIF_VALUE: 16
PIF_VALUE: 19
PIF_VALUE: 20
PIF_VALUE: 21
PIF_VALUE: 16
PIF_VALUE: 15
PIF_VALUE: 20
PIF_VALUE: 17
PIF_VALUE: 17
PIF_VALUE: 18
PIF_VALUE: 19
PIF_VALUE: 18
PIF_VALUE: 16
PIF_VALUE: 40
PIF_VALUE: 16
PIF_VALUE: 17
PIF_VALUE: 17
PIF_VALUE: 15
PIF_VALUE: 24
PIF_VALUE: 23
PIF_VALUE: 17
PIF_VALUE: 19
PIF_VALUE: 25
PIF_VALUE: 20
PIF_VALUE: 21
PIF_VALUE: 16
PIF_VALUE: 29
PIF_VALUE: 16
PIF_VALUE: 14
PIF_VALUE: 20
PIF_VALUE: 19
PIF_VALUE: 17
PIF_VALUE: 20
PIF_VALUE: 16
PIF_VALUE: 18
PIF_VALUE: 15
PIF_VALUE: 20
PIF_VALUE: 1
PIF_VALUE: 32
PIF_VALUE: 21
PIF_VALUE: 19
PIF_VALUE: 18
PIF_VALUE: 15
PIF_VALUE: 18
PIF_VALUE: 41
PIF_VALUE: 17
PIF_VALUE: 19
PIF_VALUE: 21
PIF_VALUE: 17
PIF_VALUE: 21
PIF_VALUE: 15
PIF_VALUE: 23
PIF_VALUE: 15
PIF_VALUE: 21
PIF_VALUE: 30
PIF_VALUE: 19
PIF_VALUE: 20
PIF_VALUE: 19
PIF_VALUE: 18
PIF_VALUE: 20
PIF_VALUE: 17
PIF_VALUE: 16
PIF_VALUE: 16
PIF_VALUE: 18
PIF_VALUE: 21
PIF_VALUE: 24
PIF_VALUE: 17
PIF_VALUE: 22
PIF_VALUE: 20
PIF_VALUE: 21
PIF_VALUE: 19
PIF_VALUE: 22
PIF_VALUE: 21
PIF_VALUE: 20
PIF_VALUE: 17
PIF_VALUE: 20
PIF_VALUE: 17
PIF_VALUE: 16
PIF_VALUE: 26
PIF_VALUE: 29
PIF_VALUE: 16
PIF_VALUE: 17
PIF_VALUE: 21
PIF_VALUE: 17
PIF_VALUE: 20
PIF_VALUE: 21
PIF_VALUE: 19
PIF_VALUE: 17
PIF_VALUE: 26
PIF_VALUE: 21
PIF_VALUE: 15
PIF_VALUE: 0
PIF_VALUE: 19
PIF_VALUE: 16
PIF_VALUE: 17
PIF_VALUE: 18
PIF_VALUE: 15
PIF_VALUE: 19
PIF_VALUE: 15
PIF_VALUE: 29
PIF_VALUE: 19
PIF_VALUE: 19
PIF_VALUE: 17
PIF_VALUE: 16
PIF_VALUE: 18
PIF_VALUE: 24
PIF_VALUE: 19
PIF_VALUE: 17
PIF_VALUE: 15
PIF_VALUE: 25
PIF_VALUE: 19
PIF_VALUE: 19
PIF_VALUE: 14
PIF_VALUE: 16
PIF_VALUE: 15
PIF_VALUE: 19
PIF_VALUE: 16
PIF_VALUE: 19
PIF_VALUE: 17
PIF_VALUE: 18
PIF_VALUE: 19
PIF_VALUE: 16
PIF_VALUE: 17
PIF_VALUE: 16
PIF_VALUE: 20
PIF_VALUE: 17
PIF_VALUE: 20
PIF_VALUE: 21
PIF_VALUE: 19
PIF_VALUE: 17
PIF_VALUE: 19
PIF_VALUE: 15
PIF_VALUE: 17
PIF_VALUE: 19
PIF_VALUE: 21
PIF_VALUE: 16
PIF_VALUE: 25
PIF_VALUE: 17
PIF_VALUE: 20
PIF_VALUE: 21
PIF_VALUE: 20
PIF_VALUE: 19
PIF_VALUE: 19
PIF_VALUE: 21
PIF_VALUE: 20
PIF_VALUE: 24
PIF_VALUE: 19
PIF_VALUE: 19
PIF_VALUE: 16
PIF_VALUE: 17
PIF_VALUE: 17
PIF_VALUE: 20
PIF_VALUE: 20
PIF_VALUE: 17
PIF_VALUE: 20
PIF_VALUE: 17
PIF_VALUE: 24
PIF_VALUE: 17
PIF_VALUE: 21
PIF_VALUE: 16
PIF_VALUE: 17
PIF_VALUE: 22
PIF_VALUE: 17
PIF_VALUE: 22
PIF_VALUE: 25
PIF_VALUE: 20
PIF_VALUE: 21
PIF_VALUE: 19
PIF_VALUE: 19
PIF_VALUE: 17
PIF_VALUE: 19
PIF_VALUE: 17
PIF_VALUE: 21
PIF_VALUE: 20
PIF_VALUE: 20
PIF_VALUE: 15
PIF_VALUE: 18
PIF_VALUE: 17
PIF_VALUE: 17
PIF_VALUE: 18
PIF_VALUE: 20
PIF_VALUE: 20
PIF_VALUE: 25
PIF_VALUE: 15
PIF_VALUE: 15
PIF_VALUE: 19
PIF_VALUE: 15
PIF_VALUE: 34
PIF_VALUE: 20
PIF_VALUE: 18
PIF_VALUE: 16
PIF_VALUE: 18
PIF_VALUE: 17
PIF_VALUE: 19
PIF_VALUE: 19
PIF_VALUE: 14
PIF_VALUE: 18
PIF_VALUE: 21
PIF_VALUE: 16
PIF_VALUE: 16
PIF_VALUE: 17
PIF_VALUE: 21
PIF_VALUE: 19
PIF_VALUE: 24
PIF_VALUE: 21
PIF_VALUE: 24
PIF_VALUE: 19
PIF_VALUE: 22
PIF_VALUE: 20
PIF_VALUE: 34
PIF_VALUE: 19
PIF_VALUE: 17
PIF_VALUE: 18
PIF_VALUE: 15
PIF_VALUE: 20
PIF_VALUE: 17
PIF_VALUE: 19
PIF_VALUE: 16
PIF_VALUE: 22
PIF_VALUE: 16
PIF_VALUE: 20
PIF_VALUE: 20
PIF_VALUE: 19
PIF_VALUE: 21
PIF_VALUE: 17
PIF_VALUE: 17
PIF_VALUE: 20
PIF_VALUE: 29
PIF_VALUE: 21
PIF_VALUE: 19
PIF_VALUE: 16
PIF_VALUE: 18
PIF_VALUE: 37
PIF_VALUE: 21
PIF_VALUE: 17
PIF_VALUE: 21
PIF_VALUE: 19
PIF_VALUE: 18
PIF_VALUE: 20
PIF_VALUE: 19
PIF_VALUE: 16
PIF_VALUE: 18
PIF_VALUE: 19
PIF_VALUE: 32
PIF_VALUE: 16
PIF_VALUE: 21
PIF_VALUE: 20
PIF_VALUE: 21
PIF_VALUE: 20
PIF_VALUE: 3
PIF_VALUE: 17
PIF_VALUE: 19
PIF_VALUE: 21
PIF_VALUE: 19
PIF_VALUE: 16
PIF_VALUE: 24
PIF_VALUE: 17
PIF_VALUE: 21
PIF_VALUE: 20
PIF_VALUE: 20
PIF_VALUE: 17
PIF_VALUE: 15
PIF_VALUE: 19
PIF_VALUE: 20
PIF_VALUE: 16
PIF_VALUE: 20
PIF_VALUE: 18
PIF_VALUE: 18
PIF_VALUE: 19
PIF_VALUE: 18
PIF_VALUE: 16
PIF_VALUE: 13
PIF_VALUE: 18
PIF_VALUE: 18
PIF_VALUE: 15
PIF_VALUE: 20
PIF_VALUE: 17
PIF_VALUE: 22
PIF_VALUE: 16
PIF_VALUE: 19
PIF_VALUE: 20
PIF_VALUE: 28
PIF_VALUE: 20
PIF_VALUE: 17
PIF_VALUE: 20
PIF_VALUE: 19
PIF_VALUE: 20
PIF_VALUE: 19
PIF_VALUE: 18
PIF_VALUE: 20

## 2019-01-01 ASSESSMENT — PAIN DESCRIPTION - LOCATION
LOCATION: HEAD;NECK
LOCATION: HEAD;NECK
LOCATION: EYE;FACE;HEAD
LOCATION: EYE;FACE;HEAD
LOCATION: FACE
LOCATION: FACE
LOCATION: HEAD
LOCATION: EYE
LOCATION: HEAD;NECK
LOCATION: HEAD;NECK

## 2019-01-01 ASSESSMENT — PAIN DESCRIPTION - ORIENTATION
ORIENTATION: LEFT
ORIENTATION: ANTERIOR
ORIENTATION: LEFT

## 2019-01-01 ASSESSMENT — ENCOUNTER SYMPTOMS
EYE PAIN: 0
NAUSEA: 0
FACIAL SWELLING: 1
EYE REDNESS: 0
VOMITING: 0
WHEEZING: 0
COUGH: 0
ABDOMINAL DISTENTION: 0
SHORTNESS OF BREATH: 0
DIARRHEA: 0
SINUS PRESSURE: 0
SORE THROAT: 0
BACK PAIN: 0
EYE DISCHARGE: 0

## 2019-01-01 ASSESSMENT — PAIN DESCRIPTION - ONSET
ONSET: ON-GOING
ONSET: ON-GOING
ONSET: SUDDEN
ONSET: ON-GOING

## 2019-01-01 ASSESSMENT — PAIN - FUNCTIONAL ASSESSMENT: PAIN_FUNCTIONAL_ASSESSMENT: ACTIVITIES ARE NOT PREVENTED

## 2019-01-01 ASSESSMENT — LIFESTYLE VARIABLES
HISTORY_ALCOHOL_USE: NO
HISTORY_ALCOHOL_USE: NO

## 2019-01-01 ASSESSMENT — PAIN DESCRIPTION - FREQUENCY
FREQUENCY: CONTINUOUS

## 2019-01-01 ASSESSMENT — PAIN DESCRIPTION - PAIN TYPE
TYPE: ACUTE PAIN
TYPE: CHRONIC PAIN
TYPE: ACUTE PAIN

## 2019-01-01 ASSESSMENT — PAIN DESCRIPTION - DESCRIPTORS
DESCRIPTORS: THROBBING
DESCRIPTORS: ACHING;DISCOMFORT
DESCRIPTORS: ACHING;POUNDING;THROBBING
DESCRIPTORS: CONSTANT;THROBBING
DESCRIPTORS: ACHING;DISCOMFORT
DESCRIPTORS: CONSTANT;THROBBING
DESCRIPTORS: CONSTANT;THROBBING
DESCRIPTORS: ACHING;DISCOMFORT;SORE
DESCRIPTORS: CONSTANT;THROBBING
DESCRIPTORS: ACHING;POUNDING;THROBBING
DESCRIPTORS: THROBBING;POUNDING

## 2019-01-01 ASSESSMENT — SLEEP AND FATIGUE QUESTIONNAIRES
SLEEP PATTERN: DIFFICULTY FALLING ASLEEP;NIGHTMARES/TERRORS
DIFFICULTY FALLING ASLEEP: YES
DIFFICULTY ARISING: YES
AVERAGE NUMBER OF SLEEP HOURS: 7
DO YOU USE A SLEEP AID: YES
DIFFICULTY FALLING ASLEEP: YES
DIFFICULTY STAYING ASLEEP: YES
SLEEP PATTERN: DIFFICULTY FALLING ASLEEP
DO YOU USE A SLEEP AID: YES
DO YOU HAVE DIFFICULTY SLEEPING: YES
DIFFICULTY STAYING ASLEEP: YES
DO YOU HAVE DIFFICULTY SLEEPING: YES
AVERAGE NUMBER OF SLEEP HOURS: 7
RESTFUL SLEEP: NO
RESTFUL SLEEP: NO

## 2019-01-01 ASSESSMENT — PAIN DESCRIPTION - PROGRESSION
CLINICAL_PROGRESSION: NOT CHANGED

## 2019-01-01 ASSESSMENT — PATIENT HEALTH QUESTIONNAIRE - PHQ9
SUM OF ALL RESPONSES TO PHQ QUESTIONS 1-9: 15
SUM OF ALL RESPONSES TO PHQ QUESTIONS 1-9: 12

## 2019-04-04 PROBLEM — S06.5XAA SDH (SUBDURAL HEMATOMA): Status: ACTIVE | Noted: 2019-01-01

## 2019-04-04 PROBLEM — T07.XXXA MULTIPLE TRAUMA: Status: ACTIVE | Noted: 2019-01-01
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
## 2019-04-04 NOTE — ED NOTES
This rn called to room . Pt a/ox3 stated she has to urinate and wants to walk to the bathroom. This rn explained the importance of her plan of care and was offered to help her use the bed pan while in bed and was to remain in bed until further evaluation. Pt stated \"she doesn't want to use the bed pan and will not urinate until she goes to her assigned room. \" this rn said that she will leave the bed  pan on the counter until she is ready to use it. Pt denies pain at present time, vss no distress noted continue to monitor.      Suman Lopez, RN  04/04/19 7180
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
## 2019-04-04 NOTE — ED NOTES
Pt left shoulder bleeding profusely from puncture wound. Surgicell applied to wound and pressure dressing.       Luz Elena Luna RN  04/03/19 2607 [facility],Fourth Floor Aman Mayorga RN  04/03/19 2902

## 2019-04-04 NOTE — ED NOTES
This RN entered room to shut off alarming IV. This RN found pt sitting at foot of bed with gown half off. Feces was smeared on the floor. Pt then stated \"I fell, I got real hot and dizzy. \"   This RN asked pt how she fell and if she hit her head. Pt stated \"I just fell off the chair. \" Pt informed this RN she was \"using the bedpan and then fell. \"  Pt unable to inform this RN if she hit her head. Section RN Stefany Henning then entered room and she was informed that pt fell. This RN then notified Dr Klarissa Holman who stated he would go in and assess the pt. This RN then turned on bed alarm.      Viola Carlos, DARNELL  04/04/19 [id number]
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
## 2019-04-04 NOTE — ED NOTES
Pt fell down three steps while holding her cat this evening and hit her lead face against a cabinet and had a cabinet knob black her left shoulder. The pt denies loc and is not on thinners. The pt is A+OX4 with a completely swollen left eye.       Kaylah Craig RN  04/03/19 [id number]

## 2019-04-04 NOTE — ED PROVIDER NOTES
Patient is a 67-year-old female presenting to ED after a fall down steps. Patient was caring her therapy cat down the stairs when she tripped and fell striking the left side of her face into a cabinet. Patient states the cabinet also hit her left shoulder. Patient has a laceration to the left upper arm after the fall. Patient has been able to move her arm without issue denies loss of consciousness states she is able to get up on her own and walk around. Patient is significant swelling over the left side of the face and is unable to open upper eyelid. Patient denies confusion. Patient's  is with her states that she is or normal self. Patient has a history of drug abuse states she cannot take any narcotic medication for her symptoms as she was an addict. Patient states she takes Tylenol at home does not believe it will help her. She is nontender to treat her symptoms of any medication, the injury occurred several hours ago. Review of Systems   Constitutional: Negative for chills and fever. HENT: Positive for facial swelling. Negative for ear pain, sinus pressure and sore throat. Eyes: Negative for pain, discharge and redness. Respiratory: Negative for cough, shortness of breath and wheezing. Cardiovascular: Negative for chest pain. Gastrointestinal: Negative for abdominal distention, diarrhea, nausea and vomiting. Genitourinary: Negative for dysuria and frequency. Musculoskeletal: Negative for arthralgias and back pain. Skin: Negative for rash and wound. Neurological: Positive for headaches. Negative for weakness. Hematological: Negative for adenopathy. All other systems reviewed and are negative. Physical Exam   Constitutional: She is oriented to person, place, and time. She appears well-developed and well-nourished. No distress. HENT:   Head: Normocephalic and atraumatic.    Significant facial swelling over left side of face with eye swollen shut, no entrapment of the eye visualized after lid opened with assistance. Eyes: Pupils are equal, round, and reactive to light. Neck: Normal range of motion. Neck supple. Cardiovascular: Normal rate and regular rhythm. Pulmonary/Chest: Effort normal and breath sounds normal. No respiratory distress. She has no wheezes. She has no rales. Abdominal: Soft. Bowel sounds are normal. There is no tenderness. There is no rebound and no guarding. Musculoskeletal: She exhibits no edema. Neurological: She is alert and oriented to person, place, and time. No cranial nerve deficit. Coordination normal.   Skin: Skin is warm and dry. She is not diaphoretic. Left upper arm swelling and laceration, lesion is 1cm, bleeding continues. Placed thrombin pad on lesion and placed pressure dressing   Nursing note and vitals reviewed. Procedures    MDM  Number of Diagnoses or Management Options  Subdural hematoma [facility]):   Diagnosis management comments: Evaluated for possible intracranial injury, maxillary injury, hematoma formation specifically behind left eye, also evaluated for possible cervical spine injury,    11:40 notified by radiologist that patient has subdural hematoma. Patient will require evaluation by trauma services at [address] [place] [address]. Activated transfer line patient is auto accepted by trauma surgery. Spoke with Dr. Giuseppe Keyes the ED doctor at [address] [place] [address]. He agreed to accept transfer. Patient given medication for blood pressure control. Notified of findings. Patient was initially resistant states that she wants to go home. Discussed the risks of going home with patient and she agreed to be transferred.          --------------------------------------------- PAST HISTORY ---------------------------------------------  Past Medical History:  has a past medical history of Asthma, Chronic back pain, Endometriosis, GERD (gastroesophageal reflux disease), Headache(784.0), IBS (irritable bowel syndrome), Migraine, Neck pain, and Osteoarthritis. Past Surgical History:  has a past surgical history that includes Colonoscopy; Dilation and curettage of uterus (2010); fracture surgery (Left, 01/23/2013); Mouth surgery; Endometrial ablation; Wrist fracture surgery (Left, 2012); Sigmoidoscopy; Colonoscopy; and Mouth surgery. Social History:  reports that she quit smoking about 23 years ago. Her smoking use included cigarettes. She has a 24.00 pack-year smoking history. She has never used smokeless tobacco. She reports that she does not drink alcohol or use drugs. Family History: family history includes Arthritis in her mother; Cancer in her father; Diabetes in her paternal grandmother; High Cholesterol in her mother; Other in her mother; Rectal Cancer in her father. The patients home medications have been reviewed. Allergies: Latex    -------------------------------------------------- RESULTS -------------------------------------------------    Lab  No results found for this visit on 04/03/19. Radiology  XR SHOULDER LEFT (MIN 2 VIEWS)   Final Result   No acute fractures or dislocations in the shoulder. XR HUMERUS LEFT (MIN 2 VIEWS)   Final Result   No acute fractures seen in the left humerus . CT Head WO Contrast   Final Result   1. Presence of an acute left subdural hematoma over the left frontal   and temporal convexities and along with the anterior and posterior   aspect of the falx and along the left side of the tentorium of the   cerebellum. 2. The left subdural hematoma over the left frontal convexity measures   up to 5 mm in thickness. 3. Midline shift to the left is mild of about 2.5 mm. Preliminary report given to Dr. Yenifer Rodriguez,  physician in [facility],   at the time the present interpretation. ABNORMAL REPORT.       CT FACIAL BONES WO CONTRAST   Final Result      Nondisplaced fractures of the left zygoma and lateral maxillary sinus   wall with associated soft tissue swelling. Diffuse severe paranasal sinus mucosal thickening without fluid levels      CT Cervical Spine WO Contrast   Final Result      Increasing anterior spondylolisthesis of C4 in relation to C5 as   compared to the prior study. There is no evidence of fracture. Stable anterolisthesis of C2, retrolisthesis of C3 and retrolisthesis   of C5. Severe degenerative disc and facet disease at C5-6 and C6-7. Diffuse degenerative disc and facet disease result in multilevel   central and foraminal stenosis.                ------------------------- NURSING NOTES AND VITALS REVIEWED ---------------------------  Date / Time Roomed:  4/3/2019  9:08 PM  ED Bed Assignment:  16/16    The nursing notes within the ED encounter and vital signs as below have been reviewed. Patient Vitals for the past 24 hrs:   BP Temp Temp src Pulse Resp SpO2 Height Weight   04/03/19 2341 (!) 152/93 -- -- 70 18 100 % -- --   04/03/19 2333 (!) 185/102 -- -- 67 18 98 % -- --   04/03/19 2058 (!) 186/103 98.6 °F (37 °C) Oral 82 14 96 % 5' 6\" (1.676 m) 135 lb (61.2 kg)       Oxygen Saturation Interpretation: Normal      ------------------------------------------ PROGRESS NOTES ------------------------------------------  Re-evaluation(s):  See above    I have spoken with the patient and discussed todays results, in addition to providing specific details for the plan of care and counseling regarding the diagnosis and prognosis. Their questions are answered at this time and they are agreeable with the plan. I have discussed the risks and benefits of transfer and they wish to proceed with the transfer. --------------------------------- ADDITIONAL PROVIDER NOTES ---------------------------------  Consultations:  See above    Reason for transfer: subdural hematoma.     This patient's ED course included: a personal history and physicial examination, re-evaluation prior to disposition, IV medications, cardiac monitoring, continuous
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
## 2019-04-04 NOTE — ED NOTES
Pt awake a/ox3 reminded several times not to get oob. Vss, call light within reach. No distress noted continue to monitor.      Patrick Yoon RN  04/04/19 8408

## 2019-04-04 NOTE — PROGRESS NOTES
Physical Therapy  Initial Assessment     Name: Radha Anand  : 1960  MRN: [id number]    Date of Service: 2019    Evaluating PT: Catherine Cho PT, DPT [id number]    Room #:  0831/7834-Z    Diagnosis: SDH  Precautions: Fall risk, SDH, facial bone fxs, CO    Pt lives with  in a 2 story house with 2 stair(s) and 0 rail(s) to enter. Bed is on the second floor and bath is on the first floor. Full flight of stairs and 1 rail to second floor. Pt ambulated without AD Independent prior to admission. HPI: Pt presented to the ED on 4/3 for fall down stairs. Imaging revealed facial bone fxs and SDH. Initial Evaluation  Date: 19 Treatment Date: Short Term/ Long Term   Goals   AM-PAC 6 Clicks 92/51     Was pt agreeable to Eval/treatment? Yes     Does pt have pain? 7/10 facial, back, and flank pain; recently medicated     Bed Mobility  Rolling: SBA  Supine to sit: SBA  Sit to supine: SBA  Scooting: SBA toward EOB  Rolling: Independent   Supine to sit: Independent   Sit to supine: Independent   Scooting: Independent    Transfers Sit to stand: SBA  Stand to sit: SBA  Stand pivot: Min A without AD  Sit to stand: Independent   Stand to sit: Independent   Stand pivot: Mod Independent with AAD vs Independent    Ambulation   50 feet without AD with Min A  200 feet with AAD Mod Independent vs Independent    Stair negotiation: NT  10 step(s) with 1 rail(s) Mod Independent    ROM B UE: Refer to OT note  B LE: WFL     Strength B UE: Refer to OT note  B LE: 4-/5     Balance Sitting EOB: SBA  Dynamic standing: Min A without AD  Sitting EOB: Independent   Dynamic standing: Mod Independent with AAD vs Independent      Pt is A & O x: 4 to person, place, month/year, and situation. Pt able to recall 1/3 items on STM assessment. Sensation: Pt denies numbness and tingling to extremities. Edema: Pt has moderate L facial swelling with bruising noted.     ASSESSMENT    Patient education  Pt educated on improving gait quality. Patient response to education:   Pt verbalized understanding Pt demonstrated skill Pt requires further education in this area   Yes  Partial  Yes      Comments:   Pt was supine in bed with CO present upon room entry, agreeable to PT evaluation with OT collaboration. Pt is slightly delayed and required increased time for processing. Pt has poor insight of her deficits. Pt ambulates with slow gait speed, small steps, decreased arm swing, and mild unsteady/guarded gait. Pt was cued to correct forward flexed posture and had mild posterior LOB that required Min A to correct. Pt ambulated back to room and performed 5x sit to stand transfer from EOB; pt was cued for controlled decent. Pt was assisted back to bed. Pt was left supine in bed with CO present at conclusion of session. Pts/ family goals   To return home. Patient and or family understand(s) diagnosis, prognosis, and plan of care:  Yes. PLAN  PT care will be provided in accordance with the objectives noted above. Whenever appropriate, clear delegation orders will be provided for nursing staff. Exercises and functional mobility practice will be used as well as appropriate assistive devices or modalities to obtain goals. Patient and family education will also be administered as needed. Frequency of treatments: 2-5x/week x 2-3 days.     Time in: 1315  Time out: [address], [place]  [id number]

## 2019-04-04 NOTE — ED NOTES
Assumed care of pt, pt and  updated on transfer status, iv access obtained, pt medicated for elevated bp 185/102, awaiting transfer arrangements     Mellissa [facility]  04/03/19 7246
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
## 2019-04-04 NOTE — ED NOTES
Placed pt on and off bed pan voided 550 ml yellow urine. Reminded pt not to get oob . Pt resting quietly  in bed, vss no distress noted continue to monitor.      Marivel Miramontes RN  04/04/19 7365
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
## 2019-04-04 NOTE — ED NOTES
Mobile transport here to transport pt to [facility], pt remains alert, oriented x4 with  at bedside      Vicky Jacobs  04/04/19 0024

## 2019-04-05 PROBLEM — S02.92XA: Status: ACTIVE | Noted: 2019-01-01

## 2019-04-05 NOTE — PROGRESS NOTES
Message out to Psych, NP to request order stating that patient is to go to [facility] when medically stable.

## 2019-04-05 NOTE — CONSULTS
LATEX.    SOCIAL HISTORY:  She is a former smoker. Does not use alcohol or street  drugs. PHYSICAL EXAMINATION:  GENERAL:  She is a well-developed, well-nourished female in no acute  distress. She is alert, awake, and oriented to time, place, and person. NEUROLOGIC:  Cranial nerves II through XII are grossly intact. Pupils  are equal and reactive. Extraocular movements are full. Vision is full  to confrontation. She has periorbital edema on the left side and also  on the face. The neck is not in collar. No tenderness noted. No drift  of the upper extremities noted. Handgrip is equal bilaterally. No  focal motor or sensory deficits noted in the upper or lower extremities. Sensation intact. IMPRESSION:  1. Left subdural hematoma, convexity and over the tentorium. 2.  Fracture of the left zygoma and the maxillary sinus. 3.  Multiple medical comorbidities. RECOMMENDATIONS:  No neurosurgical intervention is indicated at this  time. We will repeat the CT scan of the brain about the resolution of  the subdural hematoma. At this time, there is no indication for any  neurosurgical intervention. We will follow along.         Brian Wan MD    D: 04/05/2019 15:35:32       T: 04/05/2019 17:01:30     [email or web address]  Job#: [id number]     Doc#: [id number]    CC:
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
## 2019-04-06 PROBLEM — F32.9 MAJOR DEPRESSION, CHRONIC: Status: ACTIVE | Noted: 2019-01-01

## 2019-04-06 NOTE — PROGRESS NOTES
Patient given voluntary slip, explained voluntary, patient refusing stating that things are better at home and that [place]  has a temper, and I was afraid when I got here and I had a plan because I worked for a doctor for years and he told me the best way to do it. Me and my  are better now and he is going to take care of me. I just want to get home to my kittys. \"

## 2019-04-06 NOTE — PROGRESS NOTES
Patient pink slip signed.  Paper faxed to [facility] AN AFFILIATE OF [facility]
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
## 2019-04-06 NOTE — ED NOTES
The pt was accepted to [address] [place] [address] - bed [id number]- disposition called to Tatiana Bustos in admitting. Accepting info given to Sandra on the floor. Pink slip faxed to the floor.       Alyssa Gaytan, [facility]  04/06/19 9120

## 2019-04-06 NOTE — ED NOTES
CALLED UNIT AND REQUESTED VOLUNTARY FORM 2ND ATTEMPT. PATIENT CASE CANNOT BE REVIEWED FOR ADMISSION WITH PSYCHIATRIST UNTIL ADMISSION STATUS (VOLUNTARY OR INVOLUNTARY) IS CONFIRMED.     NOTIFIED DARNELL Mahmood

## 2019-04-06 NOTE — PROGRESS NOTES
1.  Left subdural hematoma, convexity and over the tentorium. 2.  Fracture of the left zygoma and the maxillary sinus. 3.  Multiple medical comorbidities. Stable. Periorbital ecchymosis improving. Alert awake oriented  No focal deficit.   If discharged follow up in 3 weeks

## 2019-04-06 NOTE — ED NOTES
[facility] AWARE OF PATIENT NEED FOR PSYCH.    [facility] REQUESTED VOLUNTARY FORM OR PINK SLIP BE FAXED TO EXT [phone]
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
## 2019-04-07 NOTE — PROGRESS NOTES
Patient is cleared from a trauma service standpoint and all management issues can be handled as per whatever the normal routine is in the psych unit. As there are no traumatic issues, trauma service will sign off.     Electronically signed by Gabi Edmonds MD on 4/7/2019 at 3:52 PM
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
## 2019-04-07 NOTE — PROGRESS NOTES
Patient attended community meeting. Shared daily goal and participated in morning stretch/exercise. Identified daily goal as \" Be free from pain, get home to my kitties\". Pleasant and actively engaged during discussion on community resources. Able to share and interact with peers. Recreation assessment completed. Focus is \"getting home, as I can not get better here, too uncomfortable and too much pain\".
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
## 2019-04-07 NOTE — PROGRESS NOTES
Client states  is Episcopal and does not believe in using medications. Also states he took morphine for 6 years and has been off for 2 years and using different types of alternative therapies to stay well. 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

## 2019-04-07 NOTE — PROGRESS NOTES
Dr Shane Truong notified of admission    Electronically signed by ANT Carver CNP on 4/7/2019 at 8:59 AM

## 2019-04-08 NOTE — PLAN OF CARE
Problem: Depressive Behavior With or Without Suicide Precautions:  Goal: Ability to disclose and discuss suicidal ideas will improve  Description  Ability to disclose and discuss suicidal ideas will improve  Outcome: Met This Shift     Problem: Falls - Risk of:  Goal: Absence of physical injury  Description  Absence of physical injury  Outcome: Met This Shift     Problem: Pain:  Goal: Pain level will decrease  Description  Pain level will decrease  Outcome: Ongoing     Problem: Pain:  Goal: Control of acute pain  Description  Control of acute pain  Outcome: Ongoing     Problem: Depressive Behavior With or Without Suicide Precautions:  Goal: Able to verbalize acceptance of life and situations over which he or she has no control  Description  Able to verbalize acceptance of life and situations over which he or she has no control  Outcome: Ongoing     Problem: Depressive Behavior With or Without Suicide Precautions:  Goal: Able to verbalize and/or display a decrease in depressive symptoms  Description  Able to verbalize and/or display a decrease in depressive symptoms  Outcome: Ongoing

## 2019-04-08 NOTE — PROGRESS NOTES
Group Therapy Note    Patient attended goals group and stated daily goal as to be more positive to help my pain.                                                                       Group Therapy Note     Date: 4/8/2019  Start Time: 10:00  End Time:  10:45  Number of Participants: 9     Type of Group: Psychoeducation     Wellness Binder Information  Module Name:  Coping skills  Session Number:  9-1     Patient's Goal: To id positive coping skills to use on a daily basis. Notes: Attended group and was able to participate in discussion. Status After Intervention:  Unchanged    Participation Level:  Active Listener and Interactive    Participation Quality: Attentive and Sharing      Speech:  normal      Thought Process/Content: Logical      Affective Functioning: Blunted      Mood: anxious and depressed      Level of consciousness:  Alert      Response to Learning: Progressing to goal      Endings: None Reported    Modes of Intervention: Education      Discipline Responsible: Psychoeducational Specialist      Signature:  MARTI Gallardo

## 2019-04-08 NOTE — GROUP NOTE
Group Therapy Note    Date: April 8    Group Start Time: 1600  Group End Time: 1630  Group Topic: Healthy Living/Wellness    [facility]    Kayla Rice RN        Group Therapy Note    Attendees:          Patient's Goal:      Notes:  Patient attended and participated in happiness group    Status After Intervention:  Improved    Participation Level: Interactive    Participation Quality: Appropriate, Attentive and Sharing      Speech:  normal      Thought Process/Content: Logical      Affective Functioning: Congruent      Mood: calm      Level of consciousness:  Alert      Response to Learning: Able to retain information      Endings: None Reported    Modes of Intervention: Education      Discipline Responsible: Registered Nurse      Signature:  Kayla Rice RN

## 2019-04-09 PROBLEM — G89.29 CHRONIC BACK PAIN: Chronic | Status: ACTIVE | Noted: 2019-01-01

## 2019-04-09 PROBLEM — T07.XXXA MULTIPLE TRAUMA: Status: RESOLVED | Noted: 2019-01-01 | Resolved: 2019-01-01

## 2019-04-09 PROBLEM — J44.9 COPD (CHRONIC OBSTRUCTIVE PULMONARY DISEASE) (HCC): Chronic | Status: ACTIVE | Noted: 2019-01-01

## 2019-04-09 PROBLEM — S02.92XA: Status: RESOLVED | Noted: 2019-01-01 | Resolved: 2019-01-01

## 2019-04-09 PROBLEM — M54.9 CHRONIC BACK PAIN: Chronic | Status: ACTIVE | Noted: 2019-01-01

## 2019-04-09 PROBLEM — S06.5XAA SDH (SUBDURAL HEMATOMA): Status: RESOLVED | Noted: 2019-01-01 | Resolved: 2019-01-01

## 2019-04-09 NOTE — PROGRESS NOTES
Group Therapy Note    Patient attended goals group and stated daily goal as to do positive meditation.                                                                       Group Therapy Note     Date: 4/9/2019  Start Time: 10:00  End Time:  10:55  Number of Participants: 11     Type of Group: Psychoeducation     Wellness Binder Information  Module Name:  Coping with anxiety  Session Number: NA  Patient's Goal:  To better id coping skills foe anxiety to improve wellness.     Notes: Attended group and was able to participate in discussion. Status After Intervention:  Improved    Participation Level:  Active Listener and Interactive    Participation Quality: Attentive and Sharing      Speech:  hesitant      Thought Process/Content: Logical      Affective Functioning: Flat      Mood: anxious and depressed      Level of consciousness:  Alert      Response to Learning: Progressing to goal      Endings: None Reported    Modes of Intervention: Education      Discipline Responsible: Psychoeducational Specialist      Signature:  MARTI Joshi

## 2019-04-09 NOTE — PLAN OF CARE
Pt denies SI, HI, AVH, and depression. Pt rates anxiety 8/10. Pt is preoccupied with doing yoga and getting discharged. Pt states she has been doing poses and deep breathing to help her heal and to help her deal with her anxiety. Pt is pleasant, cooperative, and social with peers. Pt is out on unit and attends groups. Pt is compliant with medications. Pt complaining of facial pain, PRN medications given, ice applied. Will continue to observe and support.

## 2019-04-09 NOTE — PROGRESS NOTES
Denies wanting to harm self or others . Stated  has been helping with meds . Stated sister has a drug abuse hx and mother does not like her to take care of her. Has been out of room with ice on face and attending groups and talking with family on phone.  came in to visit and spoke with Dr Piedad Peguero about her going home as soon as possible.

## 2019-04-09 NOTE — PROGRESS NOTES
Mother and  both called and are requesting that client be discharged. Both feel she is ready to come home. Her mother is in her 80s and states she needs client to help her. Asked the client what she did for her mother , client stated she takes her to appointments and organizes her medications for her and is more like a . Does not need to cook for her or help with her bathing. States he mother bathes herself and has meals brought into her. Client to speak with Dr about discharge.

## 2019-04-09 NOTE — PROGRESS NOTES
Group Therapy Note    Date: 4/9/2019  Start Time: 11:10  End Time:  12:00  Number of Participants: 7    Type of Group: Psychotherapy    Wellness Binder Information  Module Name:  NA  Session Number:  NA    Patient's Goal:  To acquire communication skills to help improve interpersonal relationships    Notes:  Pt was an active participant in a psychotherapy group focusing on improving interpersonal relationships and the importance of not holding things in when communicating with loved ones    Status After Intervention:  Improved    Participation Level:  Active Listener and Interactive    Participation Quality: Appropriate, Attentive, Sharing and Supportive      Speech:  normal      Thought Process/Content: Logical      Affective Functioning: Congruent      Mood: euthymic      Level of consciousness:  Alert, Oriented x4 and Attentive      Response to Learning: Able to verbalize current knowledge/experience and Able to verbalize/acknowledge new learning      Endings: None Reported    Modes of Intervention: Support      Discipline Responsible: /Counselor      Signature:  JUJU Cary
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
## 2019-04-09 NOTE — PROGRESS NOTES
Group Therapy Note    Date: 4/9/2019  Start Time: 11:10  End Time:  12:00  Number of Participants: 7    Type of Group: Psychotherapy    Wellness Binder Information  Module Name:  NA  Session Number:  NA    Patient's Goal:  To acquire communication skills to help improve interpersonal relationships    Notes:  Pt was an active participant in a psychotherapy group focusing on improving interpersonal relationships and the importance of not holding things in when communicating with loved ones    Status After Intervention:  Improved    Participation Level:  Active Listener and Interactive    Participation Quality: Appropriate, Attentive, Sharing and Supportive      Speech:  normal      Thought Process/Content: Logical      Affective Functioning: Congruent      Mood: euthymic      Level of consciousness:  Alert, Oriented x4 and Attentive      Response to Learning: Able to verbalize current knowledge/experience and Able to verbalize/acknowledge new learning      Endings: None Reported    Modes of Intervention: Support      Discipline Responsible: /Counselor      Signature:  JUJU Morrison

## 2019-04-09 NOTE — PLAN OF CARE
Problem: Depressive Behavior With or Without Suicide Precautions:  Goal: Able to verbalize and/or display a decrease in depressive symptoms  Description  Able to verbalize and/or display a decrease in depressive symptoms  4/9/2019 1900 by Moni Vitale RN  Outcome: Met This Shift  4/9/2019 0945 by Renzo Cortes RN  Outcome: Met This Shift     Problem: Depressive Behavior With or Without Suicide Precautions:  Goal: Ability to disclose and discuss suicidal ideas will improve  Description  Ability to disclose and discuss suicidal ideas will improve  Outcome: Met This Shift     Problem: Depressive Behavior With or Without Suicide Precautions:  Goal: Absence of self-harm  Description  Absence of self-harm  Outcome: Met This Shift     Problem: Falls - Risk of:  Goal: Will remain free from falls  Description  Will remain free from falls  Outcome: Met This Shift     Problem: Falls - Risk of:  Goal: Absence of physical injury  Description  Absence of physical injury  Outcome: Met This Shift     Problem: Pain:  Goal: Pain level will decrease  Description  Pain level will decrease  4/9/2019 1900 by Moni Vitale RN  Outcome: Ongoing  4/9/2019 0945 by Renzo Cortes RN  Outcome: Ongoing     Problem: Pain:  Goal: Control of acute pain  Description  Control of acute pain  Outcome: Ongoing     Problem: Pain:  Goal: Control of chronic pain  Description  Control of chronic pain  Outcome: Ongoing

## 2019-04-09 NOTE — PLAN OF CARE
Patient does not feel she needs to be here. She reports while she was in the ER, she was asked if she ever felt suicidal or wanted to die and jokingly said she did because of the pain she was in at that time. Patient reports she uses humor as a coping skill.

## 2019-04-09 NOTE — PLAN OF CARE
Problem: Depressive Behavior With or Without Suicide Precautions:  Goal: Absence of self-harm  Description  Absence of self-harm  4/9/2019 0102 by Taran Coley  Outcome: Met This Shift   Pt resting in bed with eyes closed, no observed abnormalities. Close observations continue.

## 2019-04-09 NOTE — CARE COORDINATION
SW spoke with pt and pt expressed desire to return home. Pt stated she was experiencing no SI and was comfortable with her discharge plan and would like to return home to her mother who needs her.
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
## 2019-04-09 NOTE — CONSULTS
Consults   Subjective: The patient is awake and alert. No problems overnight. Denies chest pain, angina, and dyspnea. Denies abdominal pain. Tolerating diet. No nausea or vomiting. Objective:    /80   Pulse 77   Temp 98 °F (36.7 °C) (Oral)   Resp 15   Ht 5' 6\" (1.676 m)   Wt 135 lb (61.2 kg)   LMP 10/08/2011 (Approximate)   SpO2 98%   BMI 21.79 kg/m²     Current medications that patient is taking have been reviewed. Heart:  RRR, no murmurs, gallops, or rubs.   Lungs:  CTA bilaterally, no wheeze, rales or rhonchi  Abd: bowel sounds present, soft, nontender, nondistended, no masses  Extrem:  No cyanosis or edema    CBC with Differential:    Lab Results   Component Value Date    WBC 7.5 04/08/2019    RBC 3.05 04/08/2019    HGB 8.9 04/08/2019    HCT 27.8 04/08/2019     04/08/2019    MCV 91.1 04/08/2019    MCH 29.2 04/08/2019    MCHC 32.0 04/08/2019    RDW 13.2 04/08/2019    LYMPHOPCT 31.4 04/08/2019    MONOPCT 15.3 04/08/2019    BASOPCT 0.5 04/08/2019    MONOSABS 1.14 04/08/2019    LYMPHSABS 2.34 04/08/2019    EOSABS 0.97 04/08/2019    BASOSABS 0.04 04/08/2019     CMP:    Lab Results   Component Value Date     04/05/2019    K 4.2 04/05/2019     04/05/2019    CO2 24 04/05/2019    BUN 7 04/05/2019    CREATININE 0.6 04/05/2019    GFRAA >60 04/05/2019    LABGLOM >60 04/05/2019    GLUCOSE 107 04/05/2019    PROT 5.1 04/05/2019    LABALBU 2.9 04/05/2019    CALCIUM 8.2 04/05/2019    BILITOT <0.2 04/05/2019    ALKPHOS 79 04/05/2019    AST 34 04/05/2019    ALT 21 04/05/2019     BMP:    Lab Results   Component Value Date     04/05/2019    K 4.2 04/05/2019     04/05/2019    CO2 24 04/05/2019    BUN 7 04/05/2019    LABALBU 2.9 04/05/2019    CREATININE 0.6 04/05/2019    CALCIUM 8.2 04/05/2019    GFRAA >60 04/05/2019    LABGLOM >60 04/05/2019    GLUCOSE 107 04/05/2019     Magnesium:  No results found for: MG  Phosphorus:  No results found for: PHOS  PT/INR:    Lab Results

## 2019-04-10 NOTE — DISCHARGE SUMMARY
Physician Discharge Summary      Physical Examination   VS/Measurements:     BP 96/63   Pulse 81   Temp 97.3 °F (36.3 °C) (Oral)   Resp 18   Ht 5' 6\" (1.676 m)   Wt 135 lb (61.2 kg)   LMP 10/08/2011 (Approximate)   SpO2 96%   BMI 21.79 kg/m²         Discharge Medications:              Tai Vásquez   Home Medication Instructions [id number]    Printed on:04/10/19 1793   Medication Information                      albuterol sulfate HFA (VENTOLIN HFA) 108 (90 Base) MCG/ACT inhaler  Inhale 2 puffs into the lungs every 6 hours as needed for Wheezing or Shortness of Breath             baclofen (LIORESAL) 10 MG tablet  Take 1 tablet by mouth 2 times daily             dicyclomine (BENTYL) 10 MG capsule  Take 10 mg by mouth three times daily Ac and hs             escitalopram (LEXAPRO) 10 MG tablet  Take 1 tablet by mouth daily             gabapentin (NEURONTIN) 600 MG tablet  Take 1 tablet by mouth 3 times daily for 30 days.              mometasone-formoterol (DULERA) 200-5 MCG/ACT inhaler  Inhale 2 puffs into the lungs every 12 hours             prochlorperazine (COMPAZINE) 10 MG tablet  Take 10 mg by mouth as needed             QUEtiapine Fumarate (SEROQUEL PO)  Take 400 mg by mouth nightly Pt unsure of dosage              rosuvastatin (CRESTOR) 5 MG tablet  Take 5 mg by mouth daily             sucralfate (CARAFATE) 1 GM tablet  Take 1 g by mouth 3 times daily             SUMAtriptan (IMITREX) 100 MG tablet  Take 100 mg by mouth once as needed for Migraine                     Psychiatric: Mental Status Examination:    Cognition:      [x] Alert  [x] Awake  [x] Oriented  [x] Person  [x] Place [x] Time    [] drowsy  [] tired  [] lethargic  [] distractable       Attention/Concentration:   [x] Attentive  [] Distracted        Memory Recent and Remote: [x] Intact   [] Impaired [] Partially Impaired     Language: [] Able to recognize and name objects          [] Unable to recognize and name [address] [place] [address] Knowledge:  [] Poor []  Fair  [] Good    Speech: [x] Normal  [] Soft  [] Slow  [] Fast [] Pressured            [] Loud [] Dysarthria  [] Incoherent       Appearance: [] Well Groomed  [x] Casual Dressed  [] Unkept  [] Disheveled          [] Normal weight[] Thin  [] Overweight  [] Obese           Attitude: [] Positive  [] Hostile  [] Demanding  [] Guarded  [] Defensive         [x] Cooperative  []  Uncooperative      Behavior:  [x] Normal Gait  [] Walks with Assistance  [] Karina Quale    [] Walks with Lucien Rebollar  [] In Hospital Bed  [] Sitting in Chair    Muscle-Skeletal:  [x] Normal Muscle Tone [] Muscle Atrophy       [] Abnormal Muscle Movement     Eye Contact:  [x] Good eye contact  [] Intermittent Eye Contact  [] Poor Eye Contact     Mood: [] Depressed  [] Anxious  [] Irritated  [x] Euthymic   [] Angry [] Restless    Affect:  [x] Congruent  [] Incongruent  [] Labile  [] Constricted  [] Flat  [] Bizarre     Thought Process and Association:  [] Logical [] Illogical       [x] Linear and Goal Directed  [] Tangential  [] Circumstantial     Thought Content:  [x] Denies [] Endorses [] Suicidal [] Homicidal  [] Delusional      [] Paranoid  [] Somatic  [] Grandiose    Perception: [x]  None  [] Auditory   [] Visual  [] tactile   [] olfactory  [] Illusions         Insight: [] Intact  [x] Fair  [] Limited    Judgement:  [] Intact  [x] Fair  [] Limited    Hospital Course:   Admit Date: 4/6/2019     Discharge Date: 4/10/2019  Admitted from:  [x]  Emergency Room  []  Home  []  Another facility   []  [facility]     Admitting diagnosis:   Patient Active Problem List   Diagnosis    Major depression, chronic    Chronic back pain    COPD (chronic obstructive pulmonary disease) ([facility] [address]      Length of stay:  4 days              Bull Caldwell was admitted in Psychiatric unit  from ER with depression. Patient was treated            With the above . Patient responded well to the treatment.      Discharge Summary Plan:     Discharge Status:    [x] Improved [] Unchanged    [] Worse       Discharge instructions given:  [x] Patient    [] Family [] Other         Discharge disposition:  [x] Home [] Step Down unit  [] Group Home []  [facility]                                                    [] [facility]    [] AMA  [] Other           Prescriptions: Continue same medications, review with patient.        Reason for more than one antipsychotic:  [x] N/A  [] 3 failed monotherapy(drugs tried):  [] Cross over to a new antipsychotic  [] Taper to monotherapy from polypharmacy  [] Augmentation of Clozapine therapy due to treatment resistance to single therapy      Diagnosis:        Patient Active Problem List   Diagnosis Code    Major depression, chronic F34.1    Chronic back pain M54.9, G89.29    COPD (chronic obstructive pulmonary disease) ([facility] J44.9       Education and Follow-up:  Counseled:  [x] Patient     [] Family    [] Guardian      Signed:   Moo Lu   4/10/2019   11:03 AM

## 2019-04-10 NOTE — PROGRESS NOTES
Attended and participated in goals group. Identified goal for the day as: \"be positive and do yoga\"                                                                         Group Therapy Note    Date: 4/10/2019  Start Time:  10:15  End Time:  10:45  Number of Participants: 8    Type of Group: Psychoeducation    Wellness Binder Information  Module Name:  Wellness Trivia  Session Number:  n/a    Patient's Goal: Patient will initiate conversations with peers across group answering general wellness questions. Notes:  Patient was interactive during group initiating conversations with peers across group and answered general wellness questions. Status After Intervention:  Improved    Participation Level:  Active Listener and Interactive    Participation Quality: Appropriate, Attentive, Sharing and Supportive      Speech:  normal    Thought Process/Content: Logical      Affective Functioning: Congruent      Mood: euthymic      Level of consciousness:  Alert, Oriented x4 and Attentive      Response to Learning: Able to verbalize current knowledge/experience, Able to verbalize/acknowledge new learning, Able to retain information, Capable of insight, Able to change behavior and Progressing to goal      Endings: None Reported    Modes of Intervention: Education, Support, Socialization, Exploration, Clarifying, Problem-solving and Activity      Signature:  Abraham Medel

## 2019-04-10 NOTE — PROGRESS NOTES
Dr Ashley Ruiz at pt bedside and is informed that pt is to be discharged today. Per Dr Ashley Ruiz verbal order pt ok to be discharged and is to continue home meds.

## 2019-04-10 NOTE — PROGRESS NOTES
CLINICAL PHARMACY NOTE: MEDS TO [address] Select Patient?: No  Total # of Prescriptions Filled: 1   The following medications were delivered to the patient:  · lexapro 10mg   Total # of Interventions Completed: 3  Time Spent (min): 30    Additional Documentation:
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
## 2019-04-10 NOTE — PROGRESS NOTES
DATE OF SERVICE:     4/9/2019    Ainsley Turk seen today for the purpose of continuation of care. Nursing, social work reports, laboratory studies and vital signs are reviewed. Patient chief complaint today is:             [] Depression      [] Anxiety        [] Psychosis         [] Suicidal/Homicidal                         [] Delusions           [] Aggression          Subjective:     Patient seen and also meet with her . Patient reports doing well, denied depressed mood, hopelessness, denied other neurovegatative S/S of depression. She denied anxiety or panic attacks, wants to go home, so that, she can be with her mom, cat and family.  feels, \" she is ready to come home\". He did not express any safety concerns for patinet or any one else, denied having any gun. Sleep:  [] Good [x] Fair  [] Poor  Appetite:  [] Good [x] Fair  [] Poor    Depression:  [] Mild [x] Moderate [] Severe                [] Constant [] Sporadic     Anxiety: [] Mild [] Moderate [x] Severe    [] Constant [] Sporadic     Delusions: [] Mild [] Moderate [] Severe     [] Constant [] Sporadic     [] Paranoid [] Somatic [] Grandiose     Hallucinations: [] Mild [] Moderate [] Severe     [] Constant [] Sporadic    [] Auditory  [] Visual [] Tactile       Suicidal: [] Constant [] Sporadic  Homicidal: [] Constant [] Sporadic    Unscheduled Medications     [] Patient Receiving Emergency Medications \" Chemical Restraint\"   [] Requesting PRN medications for anxiety    Psychiatric Review of systems  Delusions:  [x] Denies [] Endorses   Withdrawals:  [x] Denies [] Endorses    Hallucinations: [x] Denies [] Endorses    Extra Pyramidal Symptoms: [x] Denies [] Endorses      Medical Review of Systems:     All other than marked systmes have been reviewed and are all negative.     Constitutional Symptoms: []  fever []  Chills  Skin Symptoms: [] rash []  Pruritus   Eye Symptoms: [] Vision unchanged []  recent vision problems[] blurred vision Respiratory Symptoms:[] shortness of breath [] cough  Cardiovascular Symptoms:  [] chest pain   [] palpitations   Gastrointestinal Symptoms: []  abdominal pain []  nausea []  vomiting []  diarrhea  Genitourinary Symptoms: []  dysuria  []  hematuria   Musculoskeletal Symptoms: []  back pain []  muscle pain []  joint pain  Neurologic Symptoms: []  headache []  dizziness  Hematolymphoid Symptoms: [] Adenopathy [] Bruises   [] Schimosis       Mental Status Examination:    Cognition:      [x] Alert  [x] Awake  [x] Oriented  [x] Person  [x] Place [x] Time      [] drowsy  [] tired  [] lethargic  [] distractable  [] Other     Attention/Concentration:   [x] Attentive  [] Distracted        Memory Recent and Remote: [] Intact   [] Impaired [] Partially Impaired     Language: [x] Able to recognize and name objects          [] Unable to recognize and name Objects    Fund of Knowledge:  [] Poor [x]  Fair  [] Good    Speech: [x] Normal  [] Soft  [] Slow  [] Fast [] Pressured            [] Loud [] Dysarthria  [] Incoherent    Appearance: [] Well Groomed  [x] Casual Dressed  [] Unkept  [] Disheveled          [] Normal weight[] Thin  [] Overweight  [] Obese           Attitude: [x] Positive  [] Hostile  [] Demanding  [] Guarded  [] Defensive         [x] Cooperative  []  Uncooperative      Behavior:  [x] Normal Gait  [] Walks with Assistance  [] Dolores Chair    [] Walks with Rosalia Oas  [] In Hospital Bed  [] Sitting in Chair    Muscle-Skeletal:  [] Normal Muscle Tone [] Muscle Atrophy       [] Abnormal Muscle Movement     Eye Contact:  [x] Good eye contact  [] Intermittent Eye Contact  [] Poor Eye Contact     Mood: [] Depressed  [] Anxious  [] Irritated  [x] Euthymic   [] Angry [] Restless    Affect:  [x] Congruent  [] Incongruent  [] Labile  [] Constricted  [] Flat  [] Bizarre     Thought Process and Association:  [x] Logical [] Illogical       [x] Linear and Goal Directed  [] Tangential  [] Circumstantial     Thought Content:  [x] Denies [] Endorses [] Suicidal [] Homicidal  [] Delusional      [] Paranoid  [] Somatic  [] Grandiose    Perception: [x]  None  [] Auditory   [] Visual  [] tactile   [] olfactory  [] Illusions         Insight: [] Intact  [x] Fair  [] Limited    Judgement:  [] Intact  [] Fair  [] Limited      Assessment/Plan:        Patient Active Problem List   Diagnosis Code    Major depression, chronic F34.1    Chronic back pain M54.9, G89.29    COPD (chronic obstructive pulmonary disease) ([facility]) J44.9         Plan:    []  Patient is refusing medications  [x] Improving as expected   [] Not improving as expected   [] Worsening    []  At Baseline       Cont Lexapro 10 mg PO QAM and Vistaril 25 mg PO BID  D/C planning    Reason for more than one antipsychotic:  [] N/A  [] 3 failed monotherapy(drugs tried):  [] Cross over to a new antipsychotic  [] Taper to monotherapy from polypharmacy  [] Augmentation of Clozapine therapy due to treatment resistance to single therapy      Signed:   Ketan Gaytan  4/9/2019  9:19 PM

## 2019-04-10 NOTE — PLAN OF CARE
Problem: Depressive Behavior With or Without Suicide Precautions:  Goal: Absence of self-harm  Description  Absence of self-harm  4/10/2019 0032 by Allegra Russo  Outcome: Met This Shift   Pt resting in bed with eyes closed, no observed abnormalities. Close observations continue.

## 2019-04-11 PROBLEM — S06.5XAA SDH (SUBDURAL HEMATOMA): Status: ACTIVE | Noted: 2019-01-01

## 2019-04-11 NOTE — PROGRESS NOTES
[facility] Intensive Care Unit  Critical Care  Daily Progress Note 4/11/2019    Date of Admission:4/11/19  CC:  Gracy Mireles [place]  4/11/19 Presented to ED after being found down by . Intubated. Trauma consulted. Neurosurgery consulted. Taken to OR for stat craniotomy for SDH. Admitted to NSICU postoperatively. Intubated. PHYSICAL EXAM:    BP (!) 159/87   Pulse 80   Temp 97.8 °F (36.6 °C)   Resp 29   Ht 5' 6\" (1.676 m)   Wt 135 lb (61.2 kg)   LMP 10/08/2011 (Approximate)   SpO2 100%   BMI 21.79 kg/m²     Intake/Output Summary (Last 24 hours) at 4/11/2019 1731  Last data filed at 4/11/2019 1700  Gross per 24 hour   Intake 880 ml   Output 2450 ml   Net -1570 ml         General appearance:  Comfortable. NEUROLOGIC:        GCS:    1 - Does not open eyes   4 - Moves part of body but does not remove noxious stimulus  1 - Makes no noise   T    Pupil size:  Left 4 mm  Right 4 mm  Pupil reaction: No     Wiggles fingers: Left No Right No  Hand grasp:   Left Does not follow commands     Right:  Does not follow commands. Wiggles toes: Does not follow commands  Plantar flexion:Does not follow commands. RUE moves to pain. Weak cough and gag reflexes. Crani dressing CDI. CONSTITUTIONAL: No acute distress. Non acute ecchymotic areas over left side of face, neck and shoulder. CARDIOVASCULAR: S1 S2, regular rate, regular rhythm,Monitor: SR  PULMONARY:  Intubated on Assist control. No rhonchi/rales/wheezes. RENAL:  Owens to gravity, clear yellow urine. ABDOMEN: Soft, nontender, nondistended, nontympanic, normal bowel sounds. OGT clamped  MUSCULOSKELETAL: No LE edema. SKIN/EXTREMITIES: Skin warm and dry. IV ACCESS:   PIV      ASSESSMENT/PLAN:     Active Problems:    SDH (subdural hematoma) (HCC)    Acute respiratory failure (HCC)  Resolved Problems:    * No resolved hospital problems. *          Neuro:  AMS, SDH s/p crani for removal of SDH. Hx of SDH from fall.  Neurosurgery

## 2019-04-11 NOTE — CONSULTS
[address]                  [address] [place],  [facility]                                  CONSULTATION    PATIENT NAME: Yohana Garcia                      :        1960  MED REC NO:   [id number]                            ROOM:       0386  ACCOUNT NO:   [de-identified]                           ADMIT DATE: 2019  PROVIDER:     Yaneth Lopez MD    CONSULT DATE:  2019    CHIEF COMPLAINT:  Unresponsiveness. HISTORY OF PRESENT ILLNESS:  This is a 71-year-old female, who is known  to me. I had examined her and evaluated her following trauma on  2019. At that time, she had a small subdural hematoma on the left  side which was treated conservatively. The patient apparently went  home. According to the , she was doing very well and then he  went to bed last night, but when he woke up this morning, the patient  was unconscious. He brought her to the hospital.  CT scan of the brain,  which I performed revealed a large left-sided subdural hematoma with a  marked midline shift. The CT scan of the brain was reviewed by me. PAST HISTORY:  Asthma, chronic back pain, endometriosis, GERD, headache,  IBS, migraines, neck pain, and osteoarthritis. PAST SURGICAL HISTORY:  Colonoscopy, D and C of the uterus, endometrial  ablation, fracture surgery, mouth surgery, sigmoidoscopy, and wrist  surgery. PERSONAL HISTORY:  Allergic to LATEX. SOCIAL HISTORY:  Former smoker. Does not use alcohol or street drug  currently. PHYSICAL EXAMINATION:  GENERAL:  She is a well-developed, well-nourished female, who is deeply  comatose and is intubated. She is being supported, although she is  breathing above the vent. HEENT:  Pupils are mid, dilated, and fixed. There are no Doll's eyes. She does respond to painful stimuli a little on both sides. She had a  gag reflux present.     DIAGNOSTIC STUDIES:  I reviewed the CAT scan of the

## 2019-04-11 NOTE — BRIEF OP NOTE
Brief Postoperative Note  ______________________________________________________________    Patient: Keegan Rodriguez  YOB: 1960  MRN: [id number]  Date of Procedure: 4/11/2019    Pre-Op Diagnosis: . Acute Subdural Hematoma    Post-Op Diagnosis: Same       Procedure(s):  FULL LEFT CRANIOTOMY FOR SUBDURAL HEMATOMA EVACUATION    Anesthesia: Anesthesia type not filed in the log. Surgeon(s):  Ren Henry MD    Assistant: Darien Martell    Estimated Blood Loss (mL): 50    Complications: None    Specimens:   ID Type Source Tests Collected by Time Destination   A : BLOOD CLOT Tissue Tissue SURGICAL PATHOLOGY Ren Henry MD 4/11/2019 0850        Implants:  Implant Name Type Inv.  Item Serial No.  Lot No. LRB No. Used   SCREW UN3 1.5X4MM ST 5/PK Screw/Plate/Nail/[place] SCREW UN3 1.5X4MM ST 5/PK  SABRINA: ORTHOPAEDICS  Left 14   PLATE LPROF DBL Y X RIGD 2 HL 12MM Screw/Plate/Nail/[place] PLATE LPROF DBL Y X RIGD 2 HL 12MM  SABRINA: ORTHOPAEDICS  Left 2   PLATE BUR PLT CVR HL LPROF W/ TAB 10MM Screw/Plate/Nail/[place] PLATE BUR PLT CVR HL LPROF W/ TAB 10MM  SABRINA: ORTHOPAEDICS  Left 2         Drains:   NG/OG/NJ/NE Tube Orogastric 16 fr Left mouth (Active)       Urethral Catheter Temperature probe 16 fr (Active)   Catheter Indications Need for fluid management in critically ill patients in a critical care setting not able to be managed by other means such as BSC with hat, bedpan, urinal, condom catheter, or short term intermittent urethral catherization 4/11/2019  5:38 AM   Output (mL) 325 mL 4/11/2019  7:10 AM       Findings: As expected    Ren Henry MD  Date: 4/11/2019  Time: 10:22 AM

## 2019-04-11 NOTE — OP NOTE
[address]                  [address] [facility],  [facility]                                OPERATIVE REPORT    PATIENT NAME: Luis Thibodeaux                      :        1960  MED REC NO:   [id number]                            ROOM:       1023  ACCOUNT NO:   [de-identified]                           ADMIT DATE: 2019  PROVIDER:     Jacob Laguna MD    DATE OF PROCEDURE:  2019    SURGEON:  Jacob Laguna MD    PREOPERATIVE DIAGNOSIS:  Acute left subdural hematoma with mass effect  and midline shift. POSTOPERATIVE DIAGNOSIS:  Acute left subdural hematoma with mass effect  and midline shift. PROCEDURE PERFORMED:  Left frontotemporoparietal craniotomy for  evacuation of subdural hematoma. TECHNIQUE:  The patient was placed on the operating room table in the  supine position, and after satisfactory endotracheal general anesthesia  had been administered, the patient's head was fixed in the [place]  headrest.  The head was shaved, and it was turned to the right side, her  left side was facing up. Frontotemporoparietal scalp was prepared with  Betadine scrub and solution and routinely draped. An incision was  started at the zygoma in front of the pinna of the ear and running  posteriorly towards the lambdoid suture and then parallel to the  transverse sinus 2.5 cm from the midline all the way to the hairline. This question roz incision was taken down through the skin,  subcutaneous tissue, and aponeurosis. Hemostasis was secured using  Hayder clips. The scalp flap was reflected anterolaterally. The  temporalis muscle and fascia were also incised along the incision line  and reflected along with the scalp flap. Thus exposing the frontal,  temporal, and parietal bone. Using Rise drill, a 3 small london holes  were made in the scalp and using these 3 london holes, a free bone flap  measuring about 12 x 9 cm were raised.   The dura underneath was tight,  it tore along the anterior medial aspect. The dura was further opened  and treated laterally and posteriorly and reflected medially. A large  subdural hematoma started to comes through the incision in the dura. This clotted blood covered the frontal, temporal and parietal scalp and  also over the tentorium. The hematoma was removed completely as much as  could be visualized. There was one small bleeder over the left  frontotemporal area. This arterial bleeder was covered with a piece of  Surgicel and Gelfoam and that secured hemostasis. Once the hematoma had  been evacuated, the dura was scraped for the old blood. Then the  exposed brain was covered with pieces of Surgicel. There was lot or room between dura & brain all around so it was decided to replace bone flap. The dura was closed  with 4-0 Surgilon suture all the way around, and tacking sutures were  applied all around the dura edges. Bone flap was replaced using 2 london  hole covers and 2 dog bone type bioplates. The interface between the skull and the bone  flap were filled with pieces of Gelfoam.  After the incision area had  been irrigated with antibiotic solution, vancomycin powder was sprinkled  over the closed skull flap. Then, the temporalis muscle and fascia were  closed with 3-0 Surgilon suture. The galea aponeurotica was closed with  3-0 Surgilon suture all the way around and then skin edges with 3-0  nylon sutures. Dry dressing was applied over the incision area. The  patient was sent to the recovery room in satisfactory state. The patient tolerated the procedure well. The estimated blood loss was  about 50 mL in addition to about 150 mL of the blood clot. The patient  tolerated the procedure well.         Raquel Cheng MD    D: 04/11/2019 10:02:03       T: 04/11/2019 12:55:13     [email or web address]  Job#: [id number]     Doc#: [id number]    CC:

## 2019-04-11 NOTE — ED NOTES
Per , pt was found unresponsive on the couch. He last saw her before he went to bed.  states he is unaware of any new falls or injury. Pt has bruising to left side of face and neck. Pupils unreactive. Pt not responding to painful stimuli. Pt on ventilator, tolerating well. Labs and urine obtained and sent.  at bedside.       Eugene Valencia RN  04/11/19 2244

## 2019-04-11 NOTE — ANESTHESIA POSTPROCEDURE EVALUATION
Department of Anesthesiology  Postprocedure Note    Patient: Eugene Baum  MRN: [id number]  YOB: 1960  Date of evaluation: 4/11/2019  Time:  12:58 PM     Procedure Summary     Date:  04/11/19 Room / Location:  [place] [address] / [facility] OR    Anesthesia Start:  7397 Anesthesia Stop:  1128    Procedure:  FULL LEFT CRANIOTOMY FOR SUBDURAL HEMATOMA EVACUATION (Left ) Diagnosis:  (.)    Surgeon:  Yaw Ryan MD Responsible Provider:  Tam Baum MD    Anesthesia Type:  general ASA Status:  4 - Emergent          Anesthesia Type: No value filed. Pina Phase I: Pina Score: 3    Pina Phase II:      Last vitals: Reviewed and per EMR flowsheets.        Anesthesia Post Evaluation    Patient location during evaluation: ICU  Patient participation: complete - patient cannot participate  Level of consciousness: obtunded/minimal responses  Respiratory status: ventilator

## 2019-04-11 NOTE — PROGRESS NOTES
Patient to pacu on vent, settings are tv-500,ac-14,peep-5, 40% oxygen, 7.5 endo tube, 24 at the lip.  Papo Del Valle

## 2019-04-11 NOTE — ANESTHESIA PRE PROCEDURE
Department of Anesthesiology  Preprocedure Note       Name:  Braulio Ryan   Age:  60 y.o.  :  1960                                          MRN:  [id number]         Date:  2019      Surgeon: Hu Hutchison):  Noemi Craig MD    Procedure: FULL LEFT CRANIOTOMY FOR SUBDURAL HEMATOMA EVACUATION (Left )    Medications prior to admission:   Prior to Admission medications    Medication Sig Start Date End Date Taking? Authorizing Provider   escitalopram (LEXAPRO) 10 MG tablet Take 1 tablet by mouth daily 19   Brigitte Bingham, APRN - CNP   QUEtiapine Fumarate (SEROQUEL PO) Take 400 mg by mouth nightly Pt unsure of dosage     Historical Provider, MD   gabapentin (NEURONTIN) 600 MG tablet Take 1 tablet by mouth 3 times daily for 30 days.  3/27/19 4/26/19  Adelaida Velasquez MD   baclofen (LIORESAL) 10 MG tablet Take 1 tablet by mouth 2 times daily 3/27/19   Adelaida Velasquez MD   mometasone-formoterol [facility]) 200-5 MCG/ACT inhaler Inhale 2 puffs into the lungs every 12 hours    Historical Provider, MD   albuterol sulfate HFA (VENTOLIN HFA) 108 (90 Base) MCG/ACT inhaler Inhale 2 puffs into the lungs every 6 hours as needed for Wheezing or Shortness of Breath 17   Rusty Bui DO   sucralfate (CARAFATE) 1 GM tablet Take 1 g by mouth 3 times daily 3/1/17   Historical Provider, MD   prochlorperazine (COMPAZINE) 10 MG tablet Take 10 mg by mouth as needed 3/7/17   Historical Provider, MD   rosuvastatin (CRESTOR) 5 MG tablet Take 5 mg by mouth daily 3/15/17   Historical Provider, MD   dicyclomine (BENTYL) 10 MG capsule Take 10 mg by mouth three times daily Ac and hs    Historical Provider, MD       Current medications:    Current Facility-Administered Medications   Medication Dose Route Frequency Provider Last Rate Last Dose    0.9 % sodium chloride infusion   Intravenous Continuous Benjamin Renee  mL/hr at 19 0540      lidocaine-EPINEPHrine 2 percent-1:157150 injection    PRN Noemi Craig MD 10 mL at 19 0840     Facility-Administered Medications Ordered in Other Encounters   Medication Dose Route Frequency Provider Last Rate Last Dose    fentaNYL (SUBLIMAZE) injection    PRN Carleen Esqueda RN   50 mcg at 19 4594    furosemide (LASIX) injection    PRN Carleen Esqueda RN   20 mg at 19 0843    ceFAZolin (ANCEF) 2 g in dextrose 3 % 50 mL IVPB (duplex)    PRN Carleen Esqueda RN   2 g at 19 0844    propofol 500 MG/50ML injection    Continuous PRN Carleen Esqueda RN 27.5 mL/hr at 19 0846 75 mcg/kg/min at 19 0846    propofol injection    PRN Carleen Esqueda RN   50 mg at 19 2332    rocuronium [facility]) injection    PRN Carleen Esqueda RN   40 mg at 19 8643       Allergies:     Allergies   Allergen Reactions    Latex        Problem List:    Patient Active Problem List   Diagnosis Code    Major depression, chronic F34.1    Chronic back pain M54.9, G89.29    COPD [facility]ch[facility] obstructive [facility] disease) ([facility]) J44.9    SDH (subdural hematoma) ([facility] [address] N61.2F1T       Past Medical History:        Diagnosis Date    Asthma     Chronic back pain     Endometriosis     GERD (gastroesophageal reflux disease)     On  meds    Headache(784.0)     IBS (irritable bowel syndrome)     Migraine     Neck pain     Osteoarthritis        Past Surgical History:        Procedure Laterality Date    COLONOSCOPY      COLONOSCOPY      DILATION AND CURETTAGE OF UTERUS      ENDOMETRIAL ABLATION      FRACTURE SURGERY Left 2013    ORIF left wrist    MOUTH SURGERY      multiple    MOUTH SURGERY      SIGMOIDOSCOPY      WRIST FRACTURE SURGERY Left     metal plate for colles fracture       Social History:    Social History     Tobacco Use    Smoking status: Former Smoker     Packs/day: 2.00     Years: 12.00     Pack years: 24.00     Types: Cigarettes     Last attempt to quit: 1996     Years since quittin.2    Smokeless tobacco: Never Used    Tobacco comment: quit 20 years ago   Substance Use Topics    Alcohol use: No     Alcohol/week: 0.0 oz                                Counseling given: Not Answered  Comment: quit 20 years ago      Vital Signs (Current):   Vitals:    04/11/19 0525 04/11/19 0546 04/11/19 0730 04/11/19 0802   BP: (!) 154/90 (!) 153/91  (!) 172/85   Pulse: 78 72 126 69   Resp: 16 14 16 14   Temp: 97.9 °F (36.6 °C)      TempSrc: Temporal      SpO2: 100% 100% 100% 100%   Weight: 135 lb (61.2 kg)      Height: 5' 6\" (1.676 m)                                                 BP Readings from Last 3 Encounters:   04/11/19 (!) 172/85   04/11/19 (!) 146/92   04/06/19 139/85       NPO Status:  unknown                                                                               BMI:   Wt Readings from Last 3 Encounters:   04/11/19 135 lb (61.2 kg)   04/04/19 135 lb (61.2 kg)   04/03/19 135 lb (61.2 kg)     Body mass index is 21.79 kg/m². CBC:   Lab Results   Component Value Date    WBC 10.9 04/11/2019    RBC 3.73 04/11/2019    HGB 10.9 04/11/2019    HCT 33.3 04/11/2019    MCV 89.3 04/11/2019    RDW 12.6 04/11/2019     04/11/2019       CMP:   Lab Results   Component Value Date     04/11/2019    K 4.2 04/11/2019    K 4.2 04/05/2019    CL 96 04/11/2019    CO2 28 04/11/2019    BUN 8 04/11/2019    CREATININE 0.6 04/11/2019    GFRAA >60 04/11/2019    LABGLOM >60 04/11/2019    GLUCOSE 130 04/11/2019    PROT 7.8 04/11/2019    CALCIUM 9.4 04/11/2019    BILITOT <0.2 04/11/2019    ALKPHOS 126 04/11/2019    AST 52 04/11/2019    ALT 28 04/11/2019       POC Tests: No results for input(s): POCGLU, POCNA, POCK, POCCL, POCBUN, POCHEMO, POCHCT in the last 72 hours.     Coags:   Lab Results   Component Value Date    PROTIME 10.6 10/30/2015    INR 1.0 10/30/2015    APTT 32.6 10/30/2015       HCG (If Applicable): No results found for: PREGTESTUR, PREGSERUM, HCG, HCGQUANT     ABGs: No results found for: PHART, PO2ART, NDR0QRD, GMQ9BIU, BEART, D6KKTSXP     Type & Screen (If Applicable):  No results found for: LABABO, LABRH    Anesthesia Evaluation    Airway:        Comment: Intubated with 7.5 ETT   Dental:      Comment: See airway above    Pulmonary:   (+) COPD:  decreased breath sounds (at bases, otherwise clear),  asthma:                           ROS comment: H/o smoking (20-25 years ago)   Cardiovascular:        (-) past MI, CAD and CABG/stent      Rhythm: regular  Rate: normal                    Neuro/Psych:   (+) headaches: migraine headaches, psychiatric history (h/o OCD, ? ADHD; recent notes from psychiatry regarding suicidal ideation):depression/anxiety              ROS comment: CT head:  Interim significant increase left-sided subdural hematoma now with considerable midline shift as well as intraventricular and intraparenchymal acute hemorrhage. Previous SDH       reportedly found patient on sofa unresponsive this morning at home;  Per ED notes, pupils fixed and dilated and minimal to no response to painful stimuli      Chronic back pain       GI/Hepatic/Renal:   (+) GERD:,          ROS comment: H/o IBS. Endo/Other:    (+) : arthritis: OA., .                 Abdominal:           Vascular: negative vascular ROS. Anesthesia Plan      general     ASA 4 - emergent       Induction: intravenous. Plan discussed with CRNA. Patient brought to OR from the ER as an emergent case for craniotomy. Patient was already intubated in the ER and had 2 peripheral IVs on arrival to OR. Will place arterial line in the OR.         Vidal Runner, MD   4/11/2019  (this addendum was done preop but unable to be filed electronically at that time)

## 2019-04-11 NOTE — ED NOTES
Bed: 15  Expected date:   Expected time:   Means of arrival:   Comments:  Hold EMS     Moralesjuan alberto [facility]  04/11/19 5968

## 2019-04-11 NOTE — H&P
TRAUMA HISTORY & PHYSICAL  Advanced Practice Nurse/Resident  4/11/2019  7:48 AM    PRIMARY SURVEY    CHIEF COMPLAINT:  Trauma  Consult, found Unresponsive by  this AM.  She has a recent admission for SDH, she in intubated    AIRWAY:   Airway intubated  EMS ETT Absent   Noisy respirations Absent   Retractions: Absent   Vomiting/bleeding: Absent     BREATHING:    Midaxillary breath sound left:  Present  Midaxillary breath sound right: Present  Cough sound intensity:  Unable to Assess     SMI: Intubated    CIRCULATION:   Femoral pulse rate: within normal limits  Femoral pulse intensity: present  Palpebral conjunctiva: Red  Pink  Grey White      BP      P     SpO2       T      F    Patient Vitals for the past 8 hrs:   BP Temp Temp src Pulse Resp SpO2 Height Weight   04/11/19 0730 -- -- -- 126 16 100 % -- --   04/11/19 0546 (!) 153/91 -- -- 72 14 100 % -- --   04/11/19 0525 (!) 154/90 97.9 °F (36.6 °C) Temporal 78 16 100 % 5' 6\" (1.676 m) 135 lb (61.2 kg)       FAST EXAM: Defered     Central Nervous System    GCS Initial 15 minutes   Eye  Motor  Verbal 1 - Does not open eyes  2 - Extensor response (decerebrate)  1 - Makes no noise 1 - Does not open eyes  2 - Extensor response (decerebrate)  1 - Makes no noise     Neuromuscular blockade: No  Pupil size:  Left 4 mm    Right 4 mm  Pupil reaction: No  Wiggles fingers: Left No Right No  Wiggles toes: Left No    Right No    Hand grasp:   Left absent       Right absent  Plantar flexion: Left absent     Right absent  Loss of consciousness: Yes    History Obtained From:  spouse  Private Medical Doctor: Gely    Pre-exisiting Medical History:  yes    Conditions:   SDH  IBS,Asthma  Chrnoc pain    Medications:   Seroquel  Lexapro  Neurontin  Baclofen  dulera  Albuterol  Carafate    Allergies: Unknown    Social History:   Tobacco use:  none  Alcohol use:  none  Illicit drug use:  denies  History of drug use:  Never    Past Surgical History:    Wrist surgery    NSAID use in last 72 hours: unknown  Taken PCN in past:  unknown  Last food/drink: Unknown  Last tetanus: Unknown    Family History: Noncontributory     Complaints: Patient intubated and unresponsive     SECONDARY SURVEY    Head/scalp: Multiple areas of old bruising     Face: bruising of evasions stages of healing to face and left side of neck    Eyes/ears/nose: Atraumatic    Pharynx/mouth: Atraumatic    Neck: Atraumatic   Cervical spine tenderness: MELCHOR  ROM:       Chest wall:  Atraumatic    Heart:  Atraumatic, RRR    Abdomen: Atraumatic, Soft,  nondistended  Tenderness:   MELCHOR    Pelvis: Atraumatic  Tenderness:  MECLHOR    Thoracolumbar spine: Atraumatic  Tenderness: MELCHOR    Genitourinary:  Atraumatic. No blood or urine noted    Rectum: Atraumatic. No blood noted. Perineum: Atraumatic. No blood or urine noted. Extremities:   Sensory MELCHOR  Motor MELCHOR    Distal Pulses  Left arm Normal   Right arm Normal  Left leg Normal  Right leg Normal    Capillary refill  Left arm normal  Right arm normal  Left leg normal   Right leg normal    Procedures in ED: Intubation     Radiology:   Xr Pelvis (1-2 Views)    Result Date: 2019  Patient MRN:  [id number] : 1960 Age: 60 years Gender: Female Order Date:  2019 2:30 AM EXAM: XR PELVIS (1-2 VIEWS) NUMBER OF IMAGES:  1 INDICATION: Pelvic pain following trauma from a fall down stairs presenting acutely. trauma, fall trauma, fall COMPARISON: None FINDINGS: The bones appear to be in anatomic alignment. No fracture is identified. No other osseous abnormality is seen. No acute osseous findings. Xr Pelvis (1-2 Views)    Result Date: 2019  Patient MRN:  [id number] : 1960 Age: 60 years Gender: Female Order Date:  2019 5:45 AM EXAM: XR FEMUR LEFT (MIN 2 VIEWS), XR PELVIS (1-2 VIEWS) NUMBER OF IMAGES:  3 INDICATION: Left femur pain following trauma from a fall presenting acutely.  TRAUMA TRAUMA COMPARISON: Pelvis for 2019, 0227 hours Technique: Left femur 2 views and pelvis one view FINDINGS: No evidence of acute fracture or dislocation either at the left femur or at the pelvis. Bones normally mineralized. Normal soft tissues. No significant abnormal findings. Xr Humerus Left (min 2 Views)    Result Date: 4/3/2019  Patient MRN:  [id number] : 1960 Age: 60 years Gender: Female Order Date:  4/3/2019 10:00 PM TECHNIQUE/NUMBER OF IMAGES/COMPARISON/CLINICAL HISTORY: Left humerus frontal lateral views of 3 images 2 views. History is fall trauma injury. FINDINGS: No acute fractures seen in the left images from the left humeral head to the distal condylar region. The glenohumeral joint appears to be preserved and as well the clavicular joint. The elbow joints are not fully covered on this study but appear unremarkable. No acute fractures seen in the left humerus . Xr Femur Left (min 2 Views)    Result Date: 2019  Patient MRN:  [id number] : 1960 Age: 60 years Gender: Female Order Date:  2019 5:45 AM EXAM: XR FEMUR LEFT (MIN 2 VIEWS), XR PELVIS (1-2 VIEWS) NUMBER OF IMAGES:  3 INDICATION: Left femur pain following trauma from a fall presenting acutely. TRAUMA TRAUMA COMPARISON: Pelvis for 2019, 0227 hours Technique: Left femur 2 views and pelvis one view FINDINGS: No evidence of acute fracture or dislocation either at the left femur or at the pelvis. Bones normally mineralized. Normal soft tissues. No significant abnormal findings. Ct Head Wo Contrast    Result Date: 2019  Patient MRN: [id number] : 1960 Age:  60 years Gender: Female Order Date: 2019 5:30 AM Exam: CT HEAD WO CONTRAST Number of Images: 131 views Indication:   ams  Comparison: 2019 Findings: Scans demonstrate marked increase left acute subdural hematoma associated with considerable mass effect with midline shift to the right.  There is also evidence for intraventricular hemorrhage within the fourth ventricle as well as parenchymal hemorrhage within the cecilia and midbrain. Considerable effacement left lateral ventricle. Interim significant increase left-sided subdural hematoma now with considerable midline shift as well as intraventricular and intraparenchymal acute hemorrhage. Emergency department notified critical results 0746     Ct Head Without Contrast    Result Date: 2019  Patient MRN:  [id number] : 1960 Age: 60 years Gender: Female Order Date:  2019 7:00 AM EXAM: CT HEAD WO CONTRAST NUMBER OF IMAGES:  102 INDICATION:  trauma, SDH, 3rd repeat to see if stable COMPARISON: 4 hours prior. Technique: Low-dose CT  acquisition technique included one of following options; 1 . Automated exposure control, 2. Adjustment of MA and or KV according to patient's size or 3. Use of iterative reconstruction. Multiple CT sections were obtained with sagittal and coronal MPR reconstructions. Overall size or distribution of the acute left-sided supratentorial tentorial convexity subdural hematoma is unchanged. This has a frontotemporal and also to a lesser extent, parieto-occipital distribution and extends over the tentorium cerebelli towards the midline and superiorly extends to parafalcine location. Maximum displacement of the brain from the inner table of the skull remains only about 5 mm. Only minimal localized mass effect is noted, somewhat there only marginally effacing some of the regional sulci appear is no evidence of the appearance in the interim of any parenchymal hemorrhage. Ventricular system and ambient cisterns remain normal. There are persistent probable hemorrhagic changes in the ethmoidal sinuses, lower left frontal sinus, sphenoid sinuses, and visualized portions of the maxillary sinuses due to known facial fractures as described on the recent dedicated facial bone CT scan. No change.      Ct Head Wo Contrast    Result Date: 2019  EXAM:  CT Head Without Contrast EXAM DATE/TIME:  2019 3:00 AM CLINICAL HISTORY:  58 years old, female; Injury or trauma; Fall; Additional info: Trauma, sdh, repeat TECHNIQUE:  Imaging protocol: Axial computed tomography images of the head/brain without contrast.   Coronal and sagittal reformatted images were created and reviewed. Radiation optimization: All CT scans at this facility use at least one of these dose optimization techniques: automated exposure control; mA and/or kV adjustment per patient size (includes targeted exams where dose is matched to clinical indication); or iterative reconstruction. COMPARISON:  CT HEAD WO CONTRAST 4/3/2019 10:29 PM FINDINGS:  Brain:  Subdural hemorrhage along the left cerebral convexity extending along the left falx tentorium is again identified and is similar to mildly increased in prominence from the previous exam. Gray-white matter interface appears normal. No acute infarct. No mass or mass effect. Midline shift:  No midline shift. Ventricles:  Ventricles are not enlarged. Bones/joints:  No CT evidence for fracture. Sinuses:  Mucosal thickening about the paranasal sinuses. Near-complete opacification of the ethmoid air cells. Mastoid air cells: Visualized mastoid air cells are unremarkable. No mastoid effusion. Soft tissues:  Left periorbital soft tissue swelling similar to the prior exam.     Subdural hemorrhage along the left cerebral convexity extending along the left falx tentorium is again identified and is similar to mildly increased in prominence from the previous exam. This report has been electronically signed by Jessica Austin DO. Ct Head Wo Contrast    Result Date: 4/3/2019  Patient MRN:  [id number] : 1960 Age: 60 years Gender: Female Order Date:  4/3/2019 10:00 PM TECHNIQUE/NUMBER OF IMAGES/COMPARISON/CLINICAL HISTORY: CT brain Sequential axial images were obtained sagittal and coronal reconstructions. Clinical history is a fall trauma injury.  FINDINGS: There is contusion of the left periorbital region particularly inferiorly to the arthritic pain over the left maxillary region. Along the left frontal temporal convexity there is a left subdural hematoma measuring up to 5 mm in thickness. It causes some degree of mass effect. There is a discrete the midline shift to the right of about 2.5 mm there are. There is no evidence for some degree of stenosis of the arachnoid hemorrhage along the parasagittal region of the polar area of the left frontal lobe with minimal left subdural hematoma of the falx in the frontal region also. There is also some degree of left-sided subdural hematoma in the posterior aspect of the falx and along the left side of the tentorium. There is no intraparenchymal hemorrhagic being identified. There is no intraventricular hemorrhage. The left lateral ventricle is partially effaced by the left subdural hematoma. No indication for a sizable area of an acute focal insult to the brain parenchyma. Images with bone window settings demonstrate mucosal changes in both maxillary sinus. There is a fracture of undetermined age of the nasal bone. 1. Presence of an acute left subdural hematoma over the left frontal and temporal convexities and along with the anterior and posterior aspect of the falx and along the left side of the tentorium of the cerebellum. 2. The left subdural hematoma over the left frontal convexity measures up to 5 mm in thickness. 3. Midline shift to the left is mild of about 2.5 mm. Preliminary report given to Dr. Prashant Ferreira,  physician in [facility], at the time the present interpretation. ABNORMAL REPORT. Ct Facial Bones Wo Contrast    Result Date: 4/3/2019  Axial, sagittal, and coronal computed tomography of the facial bones was performed without contrast. There is essentially nondisplaced fracture fracture of the left zygoma and lateral maxillary sinus no other evidence of fracture or dislocation seen.  There is massive soft tissue swelling anterior to the left maxillary sinus and adjacent to the left orbit. There is mucosal thickening throughout the paranasal sinuses but no fluid levels are evident. The remainder of the regional soft tissues and osseous structures are unremarkable. Nondisplaced fractures of the left zygoma and lateral maxillary sinus wall with associated soft tissue swelling. Diffuse severe paranasal sinus mucosal thickening without fluid levels    Ct Cervical Spine Wo Contrast    Result Date: 2019  Patient MRN: [id number] : 1960 Age:  60 years Gender: Female Order Date: 2019 5:30 AM Exam: CT CERVICAL SPINE WO CONTRAST Number of Images: 276 views Indication:   ams  Comparison: 4/3/2019 Findings: Scans obtained from skull base to thoracic inlet without contrast with additional reformatted images supplied as well. Coronal reformatted images demonstrate maintenance C1, C2 articulation. Parasagittal reformatted images again demonstrate slight anterior subluxation C4 on C5 unchanged. Disc space narrowing C5, C6. Axial soft tissue windows demonstrate no CT evidence for disc herniation. Axial bone windows demonstrate no fracture or bony destruction. No acute bony abnormality evident. Degenerative changes again noted with areas of spinal stenosis at C5, C6 and C6, C7. There has been no interim change. No acute bony or soft tissue abnormality Degenerative changes as noted; no change since the exam dated 4/3/2019     Ct Cervical Spine Wo Contrast    Result Date: 4/3/2019  Clinical indications: Pain status post trauma COMPARISON: 2015 Exposure control: This examination and all examinations utilizing ionizing radiation at this facility done so according to the ALARA (as low as reasonably achievable) principal for radiation dose reduction.  TECHNIQUE: Axial, sagittal, and coronal computed tomography of the cervical spine was performed without contrast. FINDINGS: Approximate 4 mm anterior spondylolisthesis of C4 in relation to C5 has increased compared to the prior exam. No acute cardiopulmonary findings.        Consultations:  Neurosurgery     Admission/Diagnosis:   SDH    CODE Status:   Full    Plan of Treatment:  Rut Muhammad  Neurosurgery Rec's      Dr Montana Kim present at at 7:38 AM on 4/11/2019      Cheryle Block, APRN - CNP on 4/11/2019 at 7:48 AM

## 2019-04-11 NOTE — ED PROVIDER NOTES
clear,No gag reflex  Neck: Supple, full ROM, non tender to palpation in the midline, no stridor, no crepitus, no meningeal signs  Pulmonary: Lungs Rhonchi bilaterally  Cardiovascular:  Regular rate. Regular rhythm. No murmurs, gallops, or rubs. 2+ distal pulses  Chest: no chest wall tenderness  Abdomen: Soft. Non tender. Non distended. +BS. No rebound, guarding, or rigidity. No pulsatile masses appreciated. Musculoskeletal: No edema no upper or lower extremity movement  Skin: warm and dry. No rashes. Neurologic: Patient unresponsive non verbal and no upper or lower extremity movement    -------------------------------------------------- RESULTS -------------------------------------------------  I have personally reviewed all laboratory and imaging results for this patient. Results are listed below.      LABS:  Results for orders placed or performed during the hospital encounter of 04/11/19   CBC   Result Value Ref Range    WBC 10.9 4.5 - 11.5 E9/L    RBC 3.73 3.50 - 5.50 E12/L    Hemoglobin 10.9 (L) 11.5 - 15.5 g/dL    Hematocrit 33.3 (L) 34.0 - 48.0 %    MCV 89.3 80.0 - 99.9 fL    MCH 29.2 26.0 - 35.0 pg    MCHC 32.7 32.0 - 34.5 %    RDW 12.6 11.5 - 15.0 fL    Platelets 781 (H) 630 - 450 E9/L    MPV 9.3 7.0 - 12.0 fL   Comprehensive Metabolic Panel   Result Value Ref Range    Sodium 134 132 - 146 mmol/L    Potassium 4.2 3.5 - 5.0 mmol/L    Chloride 96 (L) 98 - 107 mmol/L    CO2 28 22 - 29 mmol/L    Anion Gap 10 7 - 16 mmol/L    Glucose 130 (H) 74 - 99 mg/dL    BUN 8 6 - 20 mg/dL    CREATININE 0.6 0.5 - 1.0 mg/dL    GFR Non-African American >60 >=60 mL/min/1.73    GFR African American >60     Calcium 9.4 8.6 - 10.2 mg/dL    Total Protein 7.8 6.4 - 8.3 g/dL    Alb 4.0 3.5 - 5.2 g/dL    Total Bilirubin <0.2 0.0 - 1.2 mg/dL    Alkaline Phosphatase 126 (H) 35 - 104 U/L    ALT 28 0 - 32 U/L    AST 52 (H) 0 - 31 U/L   Troponin   Result Value Ref Range    Troponin <0.01 0.00 - 0.03 ng/mL   Serum Drug Screen   Result Value Ref Range    Ethanol Lvl <10 mg/dL    Acetaminophen Level <5.0 (L) 10.0 - 46.0 mcg/mL    Salicylate, Serum <1.8 0.0 - 30.0 mg/dL    TCA Scrn NEGATIVE Cutoff:300 ng/mL   Urine Drug Screen   Result Value Ref Range    Amphetamine Screen, Urine NOT DETECTED Negative <1000 ng/mL    Barbiturate Screen, Ur POSITIVE (A) Negative < 200 ng/mL    Benzodiazepine Screen, Urine NOT DETECTED Negative < 200 ng/mL    Cannabinoid Scrn, Ur NOT DETECTED Negative < 50ng/mL    Cocaine Metabolite Screen, Urine NOT DETECTED Negative < 300 ng/mL    Opiate Scrn, Ur NOT DETECTED Negative < 300ng/mL    PCP Screen, Urine NOT DETECTED Negative < 25 ng/mL    Methadone Screen, Urine NOT DETECTED Negative <300 ng/mL    Propoxyphene Scrn, Ur NOT DETECTED Negative <300 ng/mL   Urinalysis   Result Value Ref Range    Color, UA Yellow Straw/Yellow    Clarity, UA Clear Clear    Glucose, Ur Negative Negative mg/dL    Bilirubin Urine Negative Negative    Ketones, Urine TRACE (A) Negative mg/dL    Specific Gravity, UA 1.010 1.005 - 1.030    Blood, Urine Negative Negative    pH, UA 8.0 5.0 - 9.0    Protein, UA Negative Negative mg/dL    Urobilinogen, Urine 0.2 <2.0 E.U./dL    Nitrite, Urine Negative Negative    Leukocyte Esterase, Urine Negative Negative   Blood Gas, Arterial   Result Value Ref Range    Date Analyzed [id number]     Time Analyzed 0601     Source: Blood Arterial     pH, Blood Gas 7.451 (H) 7.350 - 7.450    PCO2 34.0 (L) 35.0 - 45.0 mmHg    PO2 470.1 (H) 60.0 - 100.0 mmHg    HCO3 23.2 22.0 - 26.0 mmol/L    B.E. -0.4 -3.0 - 3.0 mmol/L    O2 Sat 99.7 (H) 92.0 - 98.5 %    PO2/FIO2 4.70 mmHg/%    AaDO2 183.9 mmHg    RI(T) 39 %    O2Hb 99.7 (H) 94.0 - 97.0 %    COHb 0.0 0.0 - 1.5 %    MetHb 0.0 0.0 - 1.5 %    O2 Content 16.3 mL/dL    HHb 0.3 0.0 - 5.0 %    tHb (est) 10.7 (L) 11.5 - 16.5 g/dL    Mode AC     FIO2 100.0 %    Rr Mechanical 14.0 b/min    Vt Mechanical 450.0 mL    Peep/Cpap 5.0 cmH2O    Date Of Collection      Time Collected      Pt Temp 37.0 C     ID [id number]     Lab [id number]     Critical(s) Notified . No Critical Values    TYPE AND SCREEN   Result Value Ref Range    ABO/Rh O POS     Antibody Screen NEG        RADIOLOGY:  Interpreted by Radiologist.  XR CHEST PORTABLE    (Results Pending)   CT Head WO Contrast    (Results Pending)   CT Cervical Spine WO Contrast    (Results Pending)   XR ABDOMEN FOR NG/OG/NE TUBE PLACEMENT    (Results Pending)         EKG: This EKG is signed and interpreted by me. Rate: 76  Rhythm: Sinus  Interpretation: no acute changes  Comparison: no previous EKG available      This X-Ray is independently viewed and interpreted by me:   - Study: Chest X-Ray   - Number of Views: 1  - Findings: Mediastinum is normal, No infiltrate, No effusion, No cardiomegaly, No pneumothorax and ET tube in place      PROCEDURE  4/11/19       Time:     INTUBATION  Risks, benefits and alternatives if able (for applicable procedures below) described. Performed By: Sriram Farah MD. With medical student Aydin    Indication:  airway protection. Informed consent:  Unable to be obtained due to the emergent nature of this procedure. .  Procedure: Following Preoxygenation the patient was pretreated with etomidate followed by succinylcholine. Intubation was performed after single attempt(s) by direct laryngoscopy using a Glidescope and 7.5mm cuffed endotracheal tube was inserted . Initial post procedure placement:  confirmed by bilateral breath sounds, ETCO2 detection, and absence of sounds over stomach. Tube Secured @ 24cm at the Lip. Post procedure chest x-ray: has been ordered but is still pending. Procedural Complications: None. Anesthesia Consult:  No.         ------------------------- NURSING NOTES AND VITALS REVIEWED ---------------------------   The nursing notes within the ED encounter and vital signs as below have been reviewed by myself.   BP (!) 153/91   Pulse 72   Temp 97.9 °F (36.6 °C) (Temporal)   Resp 14   Ht 5' 6\" (1.676 m)   Wt 135 lb (61.2 kg)   LMP 10/08/2011 (Approximate)   SpO2 100%   BMI 21.79 kg/m²   Oxygen Saturation Interpretation: Normal    The patients available past medical records and past encounters were reviewed. ------------------------------ ED COURSE/MEDICAL DECISION MAKING----------------------  Medications   0.9 % sodium chloride infusion ( Intravenous New Bag 4/11/19 2440)   etomidate (AMIDATE) injection 15 mg (15 mg Intravenous Given 4/11/19 6000)   succinylcholine (ANECTINE) injection 100 mg (100 mg Intravenous Given 4/11/19 0542)             Medical Decision Making:        Re-Evaluations:             Re-evaluation. Patients symptoms show no change      Consultations:      Called neurosurgery after seeing CT       Spoke to Dr Osman De La Paz and will evaluate  Trauma service consulted  Critical Care:   Please note that the withdrawal or failure to initiate urgent interventions for this patient would likely result in a life threatening deterioration or permanent disability. Accordingly this patient received 30 minutes of critical care time, excluding separately billable procedures. This patient's ED course included: a personal history and physicial eaxmination    This patient has been closely monitored during their ED course. Counseling: The emergency provider has spoken with the  and discussed todays results, in addition to providing specific details for the plan of care and counseling regarding the diagnosis and prognosis. Questions are answered at this time and they are agreeable with the plan.       --------------------------------- IMPRESSION AND DISPOSITION ---------------------------------    IMPRESSION  1. Acute respiratory failure, unspecified whether with hypoxia or hypercapnia (HCC)    2. Subdural hematoma (HCC)        DISPOSITION  Disposition: Admit to CCU/ICU  Patient condition is serious        NOTE: This report was transcribed using voice recognition software.  Every effort was made to ensure accuracy; however, inadvertent computerized transcription errors may be present          Stone Barnes MD  04/11/19 1975 Annie Denton MD  04/11/19 2857

## 2019-04-11 NOTE — ANESTHESIA PROCEDURE NOTES
Arterial Line:    An arterial line was placed using surface landmarks, in the OR for the following indication(s): continuous blood pressure monitoring and blood sampling needed. A 20 gauge (size), 1 and 3/4 inch (length), Arrow (type) catheter was placed, Seldinger technique used, into the right radial artery, secured by Tegaderm. Anesthesia type: General    Events:  patient tolerated procedure well with no complications. Additional notes:  Initial attempt by [facility] for right radial artery unsuccessful. Subsequent attempt by CRNA successful. No complications.     Anesthesiologist: Anton Helm RN  Resident/CRNA: ANT Marti - STEPHANIE  Other anesthesia staff: Cl Gray MD  Performed: Resident/CRNA   Preanesthetic Checklist  Completed: patient identified, site marked, surgical consent, pre-op evaluation, timeout performed, IV checked, risks and benefits discussed, monitors and equipment checked, anesthesia consent given, oxygen available and patient being monitored

## 2019-04-11 NOTE — PROCEDURES
Zacarias Ybarra is a 60 y.o. female patient. 1. Acute respiratory failure, unspecified whether with hypoxia or hypercapnia (HCC)    2. Subdural hematoma (HCC)      Past Medical History:   Diagnosis Date    Asthma     Chronic back pain     Endometriosis     GERD (gastroesophageal reflux disease)     On  meds    Headache(784.0)     IBS (irritable bowel syndrome)     Migraine     Neck pain     Osteoarthritis      Blood pressure (!) 167/80, pulse 90, temperature 100.4 °F (38 °C), temperature source Axillary, resp. rate 18, height 5' 6\" (1.676 m), weight 135 lb (61.2 kg), last menstrual period 10/08/2011, SpO2 100 %, not currently breastfeeding. Central Line  Date/Time: 4/11/2019 5:48 PM  Performed by: ANT Jasso CNP  Authorized by: ANT Jasso CNP   Consent: Written consent obtained. Risks and benefits: risks, benefits and alternatives were discussed  Consent given by: spouse  Patient identity confirmed: hospital-assigned identification number and provided demographic data  Time out: Immediately prior to procedure a \"time out\" was called to verify the correct patient, procedure, equipment, support staff and site/side marked as required.   Indications: vascular access  Anesthesia: local infiltration    Anesthesia:  Local Anesthetic: lidocaine 1% without epinephrine  Anesthetic total: 3 mL    Sedation:  Patient sedated: yes  Sedatives: propofol    Preparation: skin prepped with 2% chlorhexidine  Skin prep agent dried: skin prep agent completely dried prior to procedure  Sterile barriers: all five maximum sterile barriers used - cap, mask, sterile gown, sterile gloves, and large sterile sheet  Hand hygiene: hand hygiene performed prior to central venous catheter insertion  Location details: left subclavian  Patient position: flat  Catheter type: triple lumen  Catheter size: 7 Fr  Pre-procedure: landmarks identified  Ultrasound guidance: no  Number of attempts: 2  Successful

## 2019-04-12 NOTE — PROGRESS NOTES
Surgical Wound  · Current Nutrition Therapies:  · Oral Diet Orders: NPO   · Tube Feeding (TF) Orders:   · Feeding Route: Orogastric  · Formula: Fluid Restricted  · Rate (ml/hr):40 ml/hr    · Volume (ml/day): 960 ml tv  · Duration: Continuous  · Water Flushes: 30 ml q4 hr  · Current TF & Flush Orders Provides: 1920 kcal, 80 gm pro, 672 ml free water  · Additional Calories: propofol off during assessment  · Anthropometric Measures:  · Ht: 5' 6\" (167.6 cm)   · Current Body Wt: 138 lb (62.6 kg)(bed scale 4/12)  · Usual Body Wt: 135 lb (61.2 kg)(stated hx per EMR)  · Weight Change: MELCHOR wt changes at this time   · Ideal Body Wt: 130 lb (59 kg), % Ideal Body 106%  · BMI Classification: BMI 18.5 - 24.9 Normal Weight    Nutrition Interventions:   Continued Inpatient Monitoring, Education not appropriate at this time, Coordination of Care    Nutrition Evaluation:   · Evaluation: Goals set   · Goals:  Tolerance to TF at goal rate   · Monitoring: TF Intake, TF Tolerance, Skin Integrity, Wound Healing, I&O, Mental Status/Confusion, Weight, Pertinent Labs, Monitor Bowel Function, Monitor Hemodynamic Status      Electronically signed by Michelle Lora, MS, RD, LD on 4/12/19 at 4:00 PM    Contact Number: [phone]

## 2019-04-12 NOTE — PROGRESS NOTES
PO#1  Craniotomy for Subdural Hematoma  Intubated & being supported. Moves upper>lower extremities to pain. Decebration. Pupils 4 mm non reactive. No doll's eye.   Awaiting CT scan of brain

## 2019-04-12 NOTE — PROGRESS NOTES
Dr Lucy Burdick called to ask about a family meeting. He will be here at 10 am to meet with them. Will call . Also ordered a repeat head CT for 7 am, orders placed.

## 2019-04-12 NOTE — PROGRESS NOTES
21 100 % --   04/12/19 0053 -- -- -- 93 20 99 % --   04/12/19 0000 -- 104.7 °F (40.4 °C) Bladder 96 26 99 % --   04/11/19 2300 -- -- -- 106 21 99 % --   04/11/19 2223 -- -- -- -- 19 99 % --       I/O last 3 completed shifts: In: 8061 [I.V.:1258; NG/GT:320; IV Piggyback:197]  Out: 5616 [Urine:3040; Blood:75]  I/O this shift:  In: 100 [IV Piggyback:100]  Out: 150 [Urine:150]    Past Medical History:   Diagnosis Date    Asthma     Chronic back pain     Endometriosis     GERD (gastroesophageal reflux disease)     On  meds    Headache(784.0)     IBS (irritable bowel syndrome)     Migraine     Neck pain     Osteoarthritis        Radiology:  XR CHEST PORTABLE   Final Result   Central vascular catheter malposition as described. Note comment regarding indwelling transesophageal tube. No obvious new acute cardiopulmonary pathology evident. *ALERT: THIS IS AN ABNORMAL REPORT*         XR ABDOMEN (KUB) (SINGLE AP VIEW)   Final Result   NG tube in satisfactory position         XR CHEST PORTABLE   Final Result   Tube positions as noted         CT Head W/O Contrast Portable   Final Result   Much of the left subdural hemorrhage has been evacuated and replaced   by air. A moderate component persists measuring 13 mm. There is   significant hemorrhage along the tentorium cerebelli, within the   fourth ventricle and within the cecilia. Significant midline shift to the right and mass effect on the left   lateral ventricle is again identified. The amount of shift is slightly   less. XR ABDOMEN FOR NG/OG/NE TUBE PLACEMENT   Final Result   Endogastric tube extending into the left upper abdomen, presumably   within the stomach. XR CHEST PORTABLE   Final Result      No pulmonary airspace consolidation. Orogastric tube should be advanced so that the sidehole is in the   gastric lumen.          CT Head WO Contrast   Final Result      Interim significant increase left-sided subdural hematoma now with considerable midline shift as well as intraventricular and   intraparenchymal acute hemorrhage. Emergency department notified critical results 9937         CT Cervical Spine WO Contrast   Final Result      No acute bony or soft tissue abnormality      Degenerative changes as noted; no change since the exam dated 4/3/2019         XR CHEST PORTABLE    (Results Pending)   CT Head WO Contrast    (Results Pending)   XR CHEST PORTABLE    (Results Pending)       PHYSICAL EXAM:   GCS:  1 - Does not open eyes   1 - No motor response to pain  1 - Makes no noise- intubated    Pupil size:  Left 5 mm Right 4 mm  Pupil reaction: No  Wiggles fingers: Left No Right No  Hand grasp:   Left na   Right na  Wiggles toes: Left na    Right na  Plantar flexion: Left na  Right na    PHYSICAL EXAM  General: Intubated sedated  HEENT: s/p london hole pupils fixed, +gag reflex,   Chest: intubated  Cardiovascular: Extremities warm, well perfused,   Abdomen:  Soft and non distended. Extremities: no signs of trauma to her extremities     Spine:     Spine Tenderness ROM   Cervical na /10 Not Indicated   Thoracic na /10 Not Indicated   Lumbar na /10 Not Indicated     Musculoskeletal    Joint Tenderness Swelling Passive ROM   Right shoulder na absent normal   Left shoulder na absent normal   Right elbow na absent normal   Left elbow na absent normal   Right wrist na absent normal   Left wrist na absent normal   Right hand grasp na absent normal   Left hand grasp na absent normal   Right hip na absent normal   Left hip na absent normal   Right knee na absent normal   Left knee na absent normal   Right ankle na absent normal   Left ankle na absent normal   Right foot na absent normal   Left foot na absent normal           PROCEDURES: london hole, a line, central line    INJURIES:        Active Problems:    SDH (subdural hematoma) (HCC)    Acute respiratory failure (HCC)  Resolved Problems:    * No resolved hospital problems.
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
## 2019-04-12 NOTE — PROCEDURES
Janis Galeas is a 60 y.o. female patient. 1. Acute respiratory failure, unspecified whether with hypoxia or hypercapnia (HCC)    2. Subdural hematoma (HCC)    3. SDH (subdural hematoma) (HCC)    4. Acute respiratory failure with hypoxia and hypercapnia (HCC)      Past Medical History:   Diagnosis Date    Asthma     Chronic back pain     Endometriosis     GERD (gastroesophageal reflux disease)     On  meds    Headache(784.0)     IBS (irritable bowel syndrome)     Migraine     Neck pain     Osteoarthritis      Blood pressure 109/63, pulse 77, temperature 103.6 °F (39.8 °C), temperature source Bladder, resp. rate 20, height 5' 6\" (1.676 m), weight 138 lb 11.2 oz (62.9 kg), last menstrual period 10/08/2011, SpO2 100 %, not currently breastfeeding. Central Line  Date/Time: 4/12/2019 11:06 AM  Performed by: ANT Hsieh CNP  Authorized by: ANT Hsieh CNP   Consent: Written consent obtained.   Consent given by: spouse  Patient identity confirmed: provided demographic data and arm band  Indications: vascular access  Anesthesia: local infiltration    Anesthesia:  Local Anesthetic: lidocaine 1% without epinephrine  Anesthetic total: 3 mL    Sedation:  Patient sedated: no    Preparation: skin prepped with 2% chlorhexidine  Skin prep agent dried: skin prep agent completely dried prior to procedure  Sterile barriers: all five maximum sterile barriers used - cap, mask, sterile gown, sterile gloves, and large sterile sheet  Hand hygiene: hand hygiene performed prior to central venous catheter insertion  Location details: right subclavian  Patient position: reverse Trendelenburg  Catheter type: triple lumen  Catheter size: 7 Fr  Pre-procedure: landmarks identified  Ultrasound guidance: no  Number of attempts: 1  Successful placement: yes  Post-procedure: line sutured and dressing applied  Assessment: blood return through all ports  Patient tolerance: Patient tolerated the procedure well with no immediate complications  Comments: CXR          Manuela Abebe, APRN - CNP  4/12/2019

## 2019-04-12 NOTE — PLAN OF CARE
Problem: Inadequate oral food/beverage intake (NI-2.1)  Goal: Food and/or Nutrient Delivery  Continue current TF as tolerated  Description  Individualized approach for food/nutrient provision.   Outcome: Met This Shift

## 2019-04-12 NOTE — PROGRESS NOTES
Consult placed to Bhumika Mason. RN to call to report any changes in neuro status. Awaiting call from organ team for any further questions.
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
## 2019-04-12 NOTE — FLOWSHEET NOTE
Dr Sandra Carrillo notified of elevated temp, 106.7 bladder, verified with temporal that only read HI. Tylenol administered and cooling blanket placed.

## 2019-04-12 NOTE — PROGRESS NOTES
hospital problems. *          Neuro:  AMS, SDH s/p crani for removal of SDH. Hx of SDH from fall. Neurosurgery following. Monitor neuro status. Hypertonic saline. Sedation CT   CV: Hypotension MAP goal <150 mm Hg. Levophed. Pulm: Acute respiratory failure. Intubated on assist control. PF ratio 445. Does have spontaneous respirations over vent. Holding on PS dt potential increase in ICP. Monitor respiratory status. ABGs. CXR. Pulmicort. Performist.   GI: NPO. OGT. On TF. Pepcid. Monitor bowel function. Zofran. Renal: Hypokalemia. Replete and supplement. Cycling bmp, sodium and lactate levels. Monitor uop, renal function and electrolytes  ID:Leukocytosis. Febrile. APAP. Cooling blanket. Empiric unasyn for probable aspiration pneumonia. Gram stain- mod gram neg rods, few gram + diplococci, cocci in clusters, rare gram + rods diptheriod-like. Endocrine:  Hyperglycemia. Monitor bs. MSK:. No spontaneous movement. Repositioning. Heme: Anemia. Monitor CBC        Bowel regime: colace. Senna. MOM  Pain control/Sedation: None   DVT prophylaxis: SCDs. No chemical prophylaxis dt SDH  GI prophylaxis:Pepcid. TF  Glucose protocol: Follow labs  Mouth/Eye care: Tears and peridex  Owens: Keep in place for critical care monitoring of fluid balance. Consults: Trauma. Neurosurgery    Patient/Family update:  updated on patient status. Questions answered. Code status:  Full      Disposition:  NSICU. Critical care time 44 minutes managing postoperative state, acute respiratory failure and hypotension.      NASH Mcmahan  4/12/2019  10:52 AM

## 2019-04-12 NOTE — FLOWSHEET NOTE
TLC very difficult to flush and will not draw. After reviewing the Xray it was found to be coiled back on itself. Dr. Zoe Faria notified. Will run Gtts peripherally until morning when line can be changed.

## 2019-04-12 NOTE — FLOWSHEET NOTE
Dr. Mayra Johnson updated regarding change in patient condition. Aware of vitals and neuro assessment. No new orders.

## 2019-04-12 NOTE — PROCEDURES
Korin Kearns is a 60 y.o. female patient. 1. Acute respiratory failure, unspecified whether with hypoxia or hypercapnia (HCC)    2. Subdural hematoma (HCC)    3. SDH (subdural hematoma) (HCC)    4. Acute respiratory failure with hypoxia and hypercapnia (HCC)    5. Acute respiratory failure with hypoxia (HCC)      Past Medical History:   Diagnosis Date    Asthma     Chronic back pain     Endometriosis     GERD (gastroesophageal reflux disease)     On  meds    Headache(784.0)     IBS (irritable bowel syndrome)     Migraine     Neck pain     Osteoarthritis      Blood pressure (!) 125/59, pulse 75, temperature 100.9 °F (38.3 °C), resp. rate 15, height 5' 6\" (1.676 m), weight 138 lb 11.2 oz (62.9 kg), last menstrual period 10/08/2011, SpO2 100 %, not currently breastfeeding. Central Line  Date/Time: 4/12/2019 6:54 PM  Performed by: Daina Menchaca DO  Authorized by: Swati Vale MD   Consent: Written consent obtained. Risks and benefits: risks, benefits and alternatives were discussed  Consent given by: power of   Patient identity confirmed: arm band  Time out: Immediately prior to procedure a \"time out\" was called to verify the correct patient, procedure, equipment, support staff and site/side marked as required.   Preparation: skin prepped with 2% chlorhexidine  Skin prep agent dried: skin prep agent completely dried prior to procedure  Sterile barriers: all five maximum sterile barriers used - cap, mask, sterile gown, sterile gloves, and large sterile sheet  Hand hygiene: hand hygiene performed prior to central venous catheter insertion  Location details: right femoral  Patient position: flat  Catheter type: triple lumen  Catheter size: 12 Fr  Pre-procedure: landmarks identified  Number of attempts: 1  Successful placement: yes  Post-procedure: line sutured and dressing applied  Assessment: blood return through all ports and free fluid flow  Patient tolerance: Patient tolerated the procedure well with no immediate complications          Lexie Brunner DO  4/12/2019

## 2019-04-12 NOTE — DISCHARGE SUMMARY
Physician Discharge Summary     Patient ID:  Cayetano Cutler  [id number]  60 y.o.  1960    Admit date: 2019    Discharge date and time: 2019  6:20 PM     Admitting Physician: Arcenio Baeza MD     Admission Diagnoses: SDH (subdural hematoma) ([facility] [G09.6I8K]  SDH (subdural hematoma) ([facility] [S06.5X9A]    Discharge Diagnoses: Active Problems:    SDH (subdural hematoma) (HCC)    Acute respiratory failure ([facility]    Palliative care by specialist    Goals of care, counseling/discussion    Encounter for palliative care  Resolved Problems:    * No resolved hospital problems. *      Admission Condition: critical    Discharged Condition:    Indication for Admission: SDH    Hospital Course/Procedures/Operation/treatments:   19 Presented to ED after being found down by . Intubated. Trauma consulted. Neurosurgery consulted. Taken to OR for stat craniotomy for SDH. Admitted to NSICU postoperatively. Intubated  19 Hypotensive and hyperthermic overnight. On Levo. GCS 3. S/p craniotomy   19 Intravascular active temperature management catheter inserted yesterday for persistent hyperthermia. Decerebrate posturing. 19 Patient is still posturing no changes in mental status. NSG wants to continue aggressive management for 2-3 more days. 4/15/19: Condition unchanged, still posturing. Pupils reactive to light. Febrile requiring cooling. 19: Condition unchanged; Decerebrates to noxious stimuli. Fixed pupils. 19: Condition unchanged; Febrile; decerebrate posturing. fixed pupils. 19: Condition unchanged from previous day. 19: unsuccessful DCD donation yesterday. Patient made [facility]. 19: . TOD 1805.               Consults:   IP CONSULT TO DIETITIAN  IP CONSULT TO PALLIATIVE CARE  IP CONSULT TO HOSPICE    Significant Diagnostic Studies:   Xr Abdomen (kub) (single Ap View)    Result Date: 2019  Patient MRN: [id number] : 1960 Age:  60 years Gender: Female Order Date: 2019 1:30 PM Exam: XR ABDOMEN (KUB) (SINGLE AP VIEW) Number of Images: 1 views Indication:   check placement of OGT check placement of OGT Comparison: Exam earlier same date Findings: Portable view low chest upper abdomen demonstrates NG tube in place with tip overlying region gastric fundus. NG tube in satisfactory position     Ct Head Wo Contrast    Result Date: 2019  Patient MRN: [id number] : 1960 Age:  60 years Gender: Female Order Date: 2019 5:30 AM Exam: CT HEAD WO CONTRAST Number of Images: 131 views Indication:   ams  Comparison: 2019 Findings: Scans demonstrate marked increase left acute subdural hematoma associated with considerable mass effect with midline shift to the right. There is also evidence for intraventricular hemorrhage within the fourth ventricle as well as parenchymal hemorrhage within the cecilia and midbrain. Considerable effacement left lateral ventricle. Interim significant increase left-sided subdural hematoma now with considerable midline shift as well as intraventricular and intraparenchymal acute hemorrhage. Emergency department notified critical results 2349     Ct Cervical Spine Wo Contrast    Result Date: 2019  Patient MRN: [id number] : 1960 Age:  60 years Gender: Female Order Date: 2019 5:30 AM Exam: CT CERVICAL SPINE WO CONTRAST Number of Images: 792 views Indication:   ams  Comparison: 4/3/2019 Findings: Scans obtained from skull base to thoracic inlet without contrast with additional reformatted images supplied as well. Coronal reformatted images demonstrate maintenance C1, C2 articulation. Parasagittal reformatted images again demonstrate slight anterior subluxation C4 on C5 unchanged. Disc space narrowing C5, C6. Axial soft tissue windows demonstrate no CT evidence for disc herniation. Axial bone windows demonstrate no fracture or bony destruction. No acute bony abnormality evident.  Degenerative changes again noted with areas of spinal stenosis at C5, C6 and C6, C7. There has been no interim change. No acute bony or soft tissue abnormality Degenerative changes as noted; no change since the exam dated 4/3/2019     Xr Chest Portable    Result Date: 2019  Patient MRN: [id number] : 1960 Age:  60 years Gender: Female Order Date: 2019 5:30 PM Exam: XR CHEST PORTABLE Number of Images: 1 views Indication:   L subclavian CVC placement L subclavian CVC placement COMPARISON:  2019 at 1401. FINDINGS: Endotracheal tube in place with tip projected approximately 2.5 cm above roman. Transesophageal tube in place, tip at left mid-abdomen in anticipated gastric body region, but with side-port projected at anticipated gastroesophageal junction region, with further advancement therefore a consideration. Central vascular catheter now in place via left subclavian approach, distal portion coiled back on itself such that tip projected at anticipated mid subclavian region, rather than directed distally/caudally into SVC. Heart size and pulmonary vascular distribution unremarkable. Lungs clear of suspected acute infiltrate, edema, or effusion. Central vascular catheter malposition as described. Note comment regarding indwelling transesophageal tube. No obvious new acute cardiopulmonary pathology evident. *ALERT: THIS IS AN ABNORMAL REPORT*     Xr Chest Portable    Result Date: 2019  Patient MRN: [id number] : 1960 Age:  60 years Gender: Female Order Date: 2019 1:30 PM Exam: XR CHEST PORTABLE Number of Images: 1 views Indication:   endotube placement endotube placement Comparison: Exam earlier same date Findings: Portable anterior view demonstrates endotracheal tube tip well above roman. NG tube below the diaphragm. Heart is normal in size with normal pulmonary vascularity. Lungs are clear.      Tube positions as noted     Xr Chest Portable    Result Date: 2019  Patient MRN:  [id number] : tube. There is some mucosal thickening and possible hemorrhage in both maxillary sinuses. There is mucosal thickening of significant degree in the ethmoid air cells and to lesser degree in the left side of the frontal sinus. Previously identified large left-sided subdural hematoma has been mostly replaced by air. Some residual chronic subdural hemorrhage remains. Maximal width near the convexity measures 1.3 cm. The midline shift is slightly less than the prior study. There remains significant midline shift to the right. There remains moderate mass effect on the left lateral ventricle. Hemorrhage in the fourth ventricle is unchanged. Hemorrhage in the posterior aspect of the cecilia is unchanged. There remains subdural hemorrhage along the tentorium cerebelli. Postoperative changes in left side of the scalp with craniotomy defect is noted. Much of the left subdural hemorrhage has been evacuated and replaced by air. A moderate component persists measuring 13 mm. There is significant hemorrhage along the tentorium cerebelli, within the fourth ventricle and within the cecilia. Significant midline shift to the right and mass effect on the left lateral ventricle is again identified. The amount of shift is slightly less. Discharge Exam:  General appearance:  Comfortable.      NEUROLOGIC:   RASS Score:  - 4  GCS:  5T  1 - Does not open eyes   3 - Flexor response (decorticate)  1 - Makes no noise     Corneal reflex present. Gag reflex absent  Cough reflex present.       Pupil size:      Left 3 mm  Right 4 mm  Pupil reaction: No    Wiggles fingers: Left No Right No  Hand grasp:   Left none     Right none  Wiggles toes: Left No    Right No  Plantar flexion:          Left none    Right none  Left facial swelling & large ecchymosis. Left cheek edematous. Crani incision CDI.       CONSTITUTIONAL: No acute distress, lying in hospital bed. CARDIOVASCULAR: S1 S2, regular rate, regular rhythm, no murmur/gallop/rub. Monitor: NSR  PULMONARY: Bilaterally clear. No rhonchi/rales/wheezes, no use of accessory muscles. Intubated. Mechanical ventilation:  VT:400 ml. Mode: AC. Rate:12 bpm.  FiO2: 40%. Peep 5 cm. . PF ratio: 322. Minimal secretions. RENAL: Owens to gravity, clear yellow urine. Fluid balance for previous 24 hours:  - 542 ml. ABDOMEN: Soft, nontender, nondistended, nontympanic, normal bowel sounds. OGT. NPO.    MUSCULOSKELETAL:  Decorticate posturing with pain in all extremities. Right hand swollen. SKIN/EXTREMITIES: No rashes/ecchymosis, no edema/clubbing, warm/dry, good capillary refill. LINES:         Right subclavian  Day 10. Right femoral arterial line. Day 2. Good curve. Disposition:     In process/preliminary results:  Outstanding Order Results     Date and Time Order Name Status Description    2019 1400 PREPARE RBC (CROSSMATCH), 1 Units Preliminary     3/27/2019 0904 POCT RAPID DRUG SCREEN URINE Preliminary           Patient Instructions:   Discharge Medication List as of 2019 11:02 PM           Details   escitalopram (LEXAPRO) 10 MG tablet Take 1 tablet by mouth daily, Disp-30 tablet, R-0Normal      QUEtiapine Fumarate (SEROQUEL PO) Take 400 mg by mouth nightly Pt unsure of dosage Historical Med      gabapentin (NEURONTIN) 600 MG tablet Take 1 tablet by mouth 3 times daily for 30 days. , Disp-90 tablet, R-2Normal      baclofen (LIORESAL) 10 MG tablet Take 1 tablet by mouth 2 times daily, Disp-60 tablet, R-5Normal      mometasone-formoterol (DULERA) 200-5 MCG/ACT inhaler Inhale 2 puffs into the lungs every 12 hoursHistorical Med      albuterol sulfate HFA (VENTOLIN HFA) 108 (90 Base) MCG/ACT inhaler Inhale 2 puffs into the lungs every 6 hours as needed for Wheezing or Shortness of Breath, Disp-3 Inhaler, R-3Print      sucralfate (CARAFATE) 1 GM tablet Take 1 g by mouth 3 times daily, R-0Historical Med      prochlorperazine (COMPAZINE) 10 MG tablet Take 10 mg by mouth as needed, R-0Historical Med      rosuvastatin (CRESTOR) 5 MG tablet Take 5 mg by mouth daily, R-11Historical Med      dicyclomine (BENTYL) 10 MG capsule Take 10 mg by mouth three times daily Ac and hsHistorical Med             Follow up:   MD HALEY Key/ [address] 19 [address] [facility]             Signed:  Pamela Coley DO  Resident, PGY-1  4/23/2019  12:46 AM

## 2019-04-12 NOTE — CONSULTS
Palliative Care Department  Palliative Care Initial Consult  [facility] Day: 2    Referring Provider:  Tawnya Cardoza MD    Reason for Consult:  [x]  Code status Discussion  [x]  Assist with goals of care  [x]  Psychosocial support  []  Symptom Management      Chief Complaint: unresponsive at home    Subjective:     HPI:  Bull Caldwell is a 60 y.o. female with significant past medical history of IBS, migraine, GERD, chronic back pain, endometriosis, OA who presented to the ED from home after being found unresponsive by her . Patient recently admitted to the [facility] ED then transfer to [facility] 4/3- 4/6, after a reported fall down stairs and a SDH 2.5 mm shift, facial fracture. She was then admitted to inpatient psych 4/6 - 4/10 due to suicidal ideation and was discharged home. The morning of the 11th, the patient's  came downstairs to find her unresponsive. She was admitted with worsening SDH with increasing shift. She had an emergent craniotomy 4/11/19 (left frontotemporoparietal). Overnight, patient had a temperature and hypotension requiring pressors. Repeat CT head completed today, results pending. Family is in the waiting room. Palliative encounter documented below.       Goals of care: continue current management and to be determined    Code Status: full code    Advanced Directives: no copy but reported she has completed them    Surrogate/Legal NOK: Spouse  Contacts:  Sadia Killian    Spiritual assessment: No spiritual distress identified     Bereavement and grief: to be determined    Past Medical History:   Diagnosis Date    Asthma     Chronic back pain     Endometriosis     GERD (gastroesophageal reflux disease)     On  meds    Headache(784.0)     IBS (irritable bowel syndrome)     Migraine     Neck pain     Osteoarthritis        Past Surgical History:   Procedure Laterality Date    COLONOSCOPY  COLONOSCOPY      CRANIOTOMY Left 4/11/2019    FULL LEFT CRANIOTOMY FOR SUBDURAL HEMATOMA EVACUATION performed by Audelia Ch MD at [address]  2010    ENDOMETRIAL ABLATION      FRACTURE SURGERY Left 01/23/2013    ORIF left wrist    MOUTH SURGERY      multiple    MOUTH SURGERY      SIGMOIDOSCOPY      WRIST FRACTURE SURGERY Left 2012    metal plate for colles fracture       Family History   Problem Relation Age of Onset    High Cholesterol Mother     Arthritis Mother     Other Mother         polymyalgia and severe disc herniations    Cancer Father     Rectal Cancer Father     Diabetes Paternal Grandmother        Social history:  [place] status: no  Marital status:   Living status: with family:  spouse   Work history: helped  flip houses    History obtain from family    Current Medications:  Inpatient medications reviewed: yes  Home Medications reviewed: yes    Allergies   Allergen Reactions    Latex        Review of Systems:  Unattainable patient unresponsive    Objective:   PHYSICAL EXAM:   Vitals:    BP (!) 109/55   Pulse 78   Temp 102 °F (38.9 °C) (Bladder)   Resp 20   Ht 5' 6\" (1.676 m)   Wt 138 lb 11.2 oz (62.9 kg)   LMP 10/08/2011 (Approximate)   SpO2 100%   BMI 22.39 kg/m²   Gen: intubated, sedation stopped  HEENT: left side of face with large ecchymosis that extends to neck, healing  Neck: supple  Lungs: mechanical ventilation   Heart: regular rate and rhythm  Abdomen: normoactive bowel sounds, soft  Extremities: no edema  Neuro: not responding, pupils not reactive per staff    Results/Verification of Data Review  Objective data reviewed: labs, images, records, medication use, vitals and chart    Assessment/Plan      Active Hospital Problems    Diagnosis Date Noted    [facility] [S06.5X9A] 04/11/2019    Acute respiratory failure (HCC) [J96.00]        SDH s/p craniotomy:  -  Neurologic recovery appears poor

## 2019-04-12 NOTE — FLOWSHEET NOTE
Messaged Dr. Gaynel Eisenmenger regarding low BP Map 56-60 with SBP in 80-90's. No new orders given will monitor.

## 2019-04-13 NOTE — PROGRESS NOTES
Neuro Science Intensive Care Unit  Critical Care  Daily Progress Note 4/13/2019    Date of Admission:4/11/19  CC:  Gracy Mireles Richards  4/11/19 Presented to ED after being found down by . Intubated. Trauma consulted. Neurosurgery consulted. Taken to OR for stat craniotomy for SDH. Admitted to NSICU postoperatively. Intubated. 4/12/19 Had hypotension during night, vasopressors required. 4/13/19 Nicom applied. Fluid responsive. Levophed weaned off last evening. Zoll catheter inserted for cooling to normothermia. PHYSICAL EXAM:    /65   Pulse 76   Temp 97.7 °F (36.5 °C) (Bladder)   Resp 20   Ht 5' 6\" (1.676 m)   Wt 147 lb 4.8 oz (66.8 kg)   LMP 10/08/2011 (Approximate)   SpO2 100%   BMI 23.77 kg/m²     Intake/Output Summary (Last 24 hours) at 4/13/2019 0943  Last data filed at 4/13/2019 0900  Gross per 24 hour   Intake 4699 ml   Output 1970 ml   Net 2729 ml         General appearance:  Comfortable. NEUROLOGIC:    GCS:    1 - Does not open eyes   2 - Extensor response (decerebrate)  1 - Makes no noise      Pupil size:  Left 4 mm  Right 4 mm  Pupil reaction: No     Wiggles fingers: Left No Right No  Hand grasp:   Left Does not follow commands     Right:  Does not follow commands. Wiggles toes: Does not follow commands  Plantar flexion:Does not follow commands. Cough and gag reflexes. Crani dressing with serosanguinous drainage. CONSTITUTIONAL: No acute distress. Non acute ecchymotic areas over left side of face, neck and shoulder. CARDIOVASCULAR: S1 S2, regular rate, regular rhythm,Monitor: SR  PULMONARY:  Intubated on Assist control. No rhonchi/rales/wheezes. RENAL:  Owens to gravity, clear yellow urine. ABDOMEN: Soft, nontender, nondistended, nontympanic, normal bowel sounds. OGT  With TF  MUSCULOSKELETAL: Arms extend to noxious stimuli, no turning of wrists. No LE edema. SKIN/EXTREMITIES: Skin warm and dry.    IV ACCESS: R sublcavian D3.  R radial AL D3. Zoll catheter R femoral vein. D2      ASSESSMENT/PLAN:     Active Problems:    SDH (subdural hematoma) (HCC)    Acute respiratory failure (HCC)    Palliative care by specialist    Goals of care, counseling/discussion  Resolved Problems:    * No resolved hospital problems. *          Neuro:  AMS, SDH s/p crani for removal of SDH. Hx of SDH from fall. Neurosurgery following. Monitor neuro status. Hypertonic saline. CV Hypotension resolved. MAP goal <65 mm Hg. Nicom. Monitor hemodynamics. Pulm: Acute respiratory failure. Intubated on assist control. PF ratio 433. Does have spontaneous respirations over vent. Holding on PS dt potential increase in ICP. Monitor respiratory status. ABGs. CXR. Pulmicort. Performist.   GI: NPO. OGT. On TF. Pepcid. Monitor bowel function. Zofran. Renal: Hypophosphatemia. Replete and supplement. Hypocalcemia. Replete. Cycling bmp, sodium and lactate levels. Monitor uop, renal function and electrolytes  ID:Leukocytosis. Febrile. Zoll cooling system. APAP. Sputum cx with Staph and Ecoli. Unasyn D3. Vancomycin -one dose. Endocrine:  Hyperglycemia. Monitor bs. MSK:. No spontaneous movement. Repositioning. Heme: Anemia. Monitor CBC        Bowel regime: Colace. Senna. MOM  Pain control/Sedation:Fentanyl  DVT prophylaxis: SCDs. No chemical prophylaxis dt SDH  GI prophylaxis:Pepcid. TF  Glucose protocol: Follow labs  Mouth/Eye care: Tears and peridex  Owens: Keep in place for critical care monitoring of fluid balance. Consults: Trauma. Neurosurgery. Palliative care. Patient/Family update:  updated on patient status. Questions answered. Plan for family meeting today. Code status:  Full      Disposition:  NSICU. Critical care time 50 minutes managing postoperative state, acute respiratory failure and hypotension.      ANT Crow-CNP  4/13/2019  9:43 AM

## 2019-04-13 NOTE — PROGRESS NOTES
04/13/19 0255   Vent Information   Vent Type 980   Vent Mode AC/VC   Vt Ordered 450 mL   Rate Set 12 bmp   Peak Flow 50 L/min   Pressure Support 0 cmH20   FiO2  40 %   Sensitivity 3   PEEP/CPAP 5   I Time/ I Time % 0 s   Vent Patient Data   High Peep/I Pressure 0   returned from Cat scan placed back on the vent

## 2019-04-13 NOTE — PROGRESS NOTES
04/13/19 0234   Patient Transport   Time spent transporting 16-30   Transport ventillation type Transport vent   Transport from ICU   Transport destination CT scan   Emergency equipment included Yes   Patient transported to Cat scan with no issues good chest rise, returned to 4413 placed back on the vent

## 2019-04-13 NOTE — PROGRESS NOTES
PO#2  Left craniotomy for Subdural Hematoma. Comatose & intubated. Breathing above the vent. Gag reflex +  Moving extremities to stimuli probably decerebrate posturing. Questionable Doll's eye reflex. Reviewed CT scan of brain from today. No significant interval change. There remains considerable mass effect   on left lateral ventricle and midline shift to the right   Spoke with family. Had family conference  Answered questions. Recommend continue with aggressive management for another 2-3 days in light of improvement in neuro status.   Will have another meeting in 2-3 days

## 2019-04-13 NOTE — PLAN OF CARE
Problem: Risk for Impaired Skin Integrity  Goal: Tissue integrity - skin and mucous membranes  Description  Structural intactness and normal physiological function of skin and  mucous membranes.   4/13/2019 7050 by Yesi Jacobs RN  Outcome: Met This Shift  4/12/2019 1902 by Chelsy Harris RN  Outcome: Met This Shift     Problem: Falls - Risk of:  Goal: Will remain free from falls  Description  Will remain free from falls  4/13/2019 0611 by Yesi Jacobs RN  Outcome: Met This Shift     Problem: Falls - Risk of:  Goal: Absence of physical injury  Description  Absence of physical injury  4/13/2019 0611 by Yesi Jacobs RN  Outcome: Met This Shift     Problem: Pain:  Goal: Control of acute pain  Description  Control of acute pain  Outcome: Met This Shift

## 2019-04-13 NOTE — PLAN OF CARE
Problem: Risk for Impaired Skin Integrity  Goal: Tissue integrity - skin and mucous membranes  Description  Structural intactness and normal physiological function of skin and  mucous membranes.   4/13/2019 1635 by Dale Anaya RN  Outcome: Met This Shift  4/13/2019 1000 by Dale Anaya RN  Outcome: Met This Shift  4/13/2019 0611 by Aris Ivan RN  Outcome: Met This Shift     Problem: Falls - Risk of:  Goal: Will remain free from falls  Description  Will remain free from falls  4/13/2019 1635 by Dale Anaya RN  Outcome: Met This Shift  4/13/2019 1000 by Dale Anaya RN  Outcome: Met This Shift  4/13/2019 0611 by Aris Ivan RN  Outcome: Met This Shift  Goal: Absence of physical injury  Description  Absence of physical injury  4/13/2019 1635 by Dale Anaya RN  Outcome: Met This Shift  4/13/2019 1000 by Dale Anaya RN  Outcome: Met This Shift  4/13/2019 0611 by Aris Ivan RN  Outcome: Met This Shift     Problem: Pain:  Goal: Pain level will decrease  Description  Pain level will decrease  4/13/2019 1635 by Dale Anaya RN  Outcome: Met This Shift  4/13/2019 1000 by Dale Anaya RN  Outcome: Met This Shift  Goal: Control of acute pain  Description  Control of acute pain  4/13/2019 1635 by Dale Anaya RN  Outcome: Met This Shift  4/13/2019 1000 by Dale Anaya RN  Outcome: Met This Shift  4/13/2019 0611 by Aris Ivan RN  Outcome: Met This Shift

## 2019-04-13 NOTE — PROGRESS NOTES
Called by RN of patient being unresponsive. Patient unable to be awakened with noxious stimulations. Patient did yell with POCT needle stick. . Stat CT done.

## 2019-04-13 NOTE — PLAN OF CARE
Problem: Risk for Impaired Skin Integrity  Goal: Tissue integrity - skin and mucous membranes  Description  Structural intactness and normal physiological function of skin and  mucous membranes.   4/13/2019 1000 by Melisa Coley RN  Outcome: Met This Shift  4/13/2019 0611 by Cayetano Bernal RN  Outcome: Met This Shift     Problem: Falls - Risk of:  Goal: Will remain free from falls  Description  Will remain free from falls  4/13/2019 1000 by Melisa Coley RN  Outcome: Met This Shift  4/13/2019 0611 by Cayetano Bernal RN  Outcome: Met This Shift  Goal: Absence of physical injury  Description  Absence of physical injury  4/13/2019 1000 by Melisa Coley RN  Outcome: Met This Shift  4/13/2019 0611 by Cayetano Bernal RN  Outcome: Met This Shift     Problem: Pain:  Goal: Pain level will decrease  Description  Pain level will decrease  Outcome: Met This Shift  Goal: Control of acute pain  Description  Control of acute pain  4/13/2019 1000 by Melisa Coley RN  Outcome: Met This Shift  4/13/2019 0611 by Cayetano Bernal RN  Outcome: Met This Shift

## 2019-04-13 NOTE — PROGRESS NOTES
[facility]  ATTENDING PHYSICIAN SURGERY PROGRESS NOTE     I have examined the patient, reviewed the record, and discussed the case with the APN/  Resident. I have reviewed all relevant labs and imaging data. The following summarizes my clinical findings and independent assessment. Chief Complaint   Patient presents with    Loss of Consciousness     Pt found unresponsive by son. Last seen at 2230. PT has no gag reflex, seen last week after falling has SDH       S:   4/11/19 Presented to ED after being found down by . Intubated. Trauma consulted. Neurosurgery consulted. Taken to OR for stat craniotomy for SDH. Admitted to NSICU postoperatively. Intubated  4/12/19 Hypotensive and hyperthermic overnight. On Levo. GCS 3. S/p craniotomy   4/13/19 Intravascular active temperature management catheter inserted yesterday for persistent hyperthermia. Decerebrate posturing. 4/13/19 Decerebrate posturing in all extremities           O:  Physical Exam   Constitutional: She appears well-developed. HENT:   Head: Normocephalic. Scalp dressing in place    Eyes:   4mm non reactive    Pulmonary/Chest: Effort normal.   No cough or gag    Abdominal: Soft. She exhibits no distension. Musculoskeletal: She exhibits no edema. Neurological:   GCS 4        Assessment   Active Problems:    SDH (subdural hematoma) (HCC)    Acute respiratory failure ([address]    Palliative care by specialist    Goals of care, counseling/discussion  Resolved Problems:    * No resolved hospital problems. *      Plan   Family meeting with Dr. Kuldip Ratliff at [facility] today  Management per ICU team  Zoll placed yesterday   Off levophed   On 3%   Staph aureus, GNR ( Pan sensitice)  in sputum- Abx per ICU   Prognosis poor.  Palliative on board     Marques Dougherty MD

## 2019-04-14 NOTE — PROGRESS NOTES
Spoke with Bushra Moreno and papers faxed.   Stated they will get in touch with the family for brady davis

## 2019-04-14 NOTE — PLAN OF CARE
Problem: Risk for Impaired Skin Integrity  Goal: Tissue integrity - skin and mucous membranes  Description  Structural intactness and normal physiological function of skin and  mucous membranes.   4/14/2019 0035 by Saadia Ames RN  Outcome: Met This Shift  4/13/2019 1635 by Jojo Russ RN  Outcome: Met This Shift     Problem: Falls - Risk of:  Goal: Absence of physical injury  Description  Absence of physical injury  4/14/2019 0035 by Saadia Ames RN  Outcome: Met This Shift  4/13/2019 1635 by Jojo Russ RN  Outcome: Met This Shift

## 2019-04-14 NOTE — PLAN OF CARE
Problem: Risk for Impaired Skin Integrity  Goal: Tissue integrity - skin and mucous membranes  Description  Structural intactness and normal physiological function of skin and  mucous membranes.   4/14/2019 1147 by Houston Houston RN  Outcome: Met This Shift  4/14/2019 0035 by Liliya Sterling RN  Outcome: Met This Shift     Problem: Falls - Risk of:  Goal: Will remain free from falls  Description  Will remain free from falls  Outcome: Met This Shift  Goal: Absence of physical injury  Description  Absence of physical injury  4/14/2019 1147 by Houston Houston RN  Outcome: Met This Shift  4/14/2019 0035 by Liliya Sterling RN  Outcome: Met This Shift     Problem: Pain:  Goal: Pain level will decrease  Description  Pain level will decrease  Outcome: Met This Shift  Goal: Control of acute pain  Description  Control of acute pain  Outcome: Met This Shift

## 2019-04-14 NOTE — PROCEDURES
Lisa Rose is a 60 y.o. female patient. 1. SDH (subdural hematoma) ([facility] [address]    2. Acute respiratory failure, unspecified whether with hypoxia or hypercapnia (HCC)    3. Subdural hematoma (HCC)    4. Acute respiratory failure with hypoxia and hypercapnia (HCC)    5. Acute respiratory failure with hypoxia (HCC)      Past Medical History:   Diagnosis Date    Asthma     Chronic back pain     Endometriosis     GERD (gastroesophageal reflux disease)     On  meds    Headache(784.0)     IBS (irritable bowel syndrome)     Migraine     Neck pain     Osteoarthritis      Blood pressure 124/67, pulse 76, temperature 99.5 °F (37.5 °C), temperature source Bladder, resp. rate (!) 5, height 5' 6\" (1.676 m), weight 147 lb 11.2 oz (67 kg), last menstrual period 10/08/2011, SpO2 100 %, not currently breastfeeding. Insert Arterial Line  Date/Time: 4/14/2019 3:51 PM  Performed by: ANT James CNP  Authorized by: ANT James CNP   Consent: Verbal consent obtained. Risks and benefits: risks, benefits and alternatives were discussed  Consent given by: spouse  Patient identity confirmed: arm band and hospital-assigned identification number  Time out: Immediately prior to procedure a \"time out\" was called to verify the correct patient, procedure, equipment, support staff and site/side marked as required. Preparation: Patient was prepped and draped in the usual sterile fashion.   Indications: multiple ABGs, respiratory failure and hemodynamic monitoring  Location: left radial    Sedation:  Patient sedated: no    Mingo's test normal: yes  Needle gauge: 20  Seldinger technique: Seldinger technique used  Number of attempts: 2  Post-procedure: line sutured and dressing applied  Post-procedure CMS: normal and unchanged  Patient tolerance: Patient tolerated the procedure well with no immediate complications          ANT Wilson CNP  4/14/2019

## 2019-04-14 NOTE — PROGRESS NOTES
PO#3  Left craniotomy for Subdural Hematoma. Comatose & intubated. Breathing above the vent. Gag reflex +  Moving extremities to stimuli probably decerebrate posturing. Probably Doll's eye reflex  Spoke with family.

## 2019-04-14 NOTE — PLAN OF CARE
Problem: Risk for Impaired Skin Integrity  Goal: Tissue integrity - skin and mucous membranes  Description  Structural intactness and normal physiological function of skin and  mucous membranes.   4/14/2019 1617 by Mariana Houston RN  Outcome: Met This Shift  4/14/2019 1147 by Mariana Houston RN  Outcome: Met This Shift     Problem: Falls - Risk of:  Goal: Will remain free from falls  Description  Will remain free from falls  4/14/2019 1617 by Mariana Houston RN  Outcome: Met This Shift  4/14/2019 1147 by Mariana Houston RN  Outcome: Met This Shift  Goal: Absence of physical injury  Description  Absence of physical injury  4/14/2019 1617 by Mariana Houston RN  Outcome: Met This Shift  4/14/2019 1147 by Mariana Houston RN  Outcome: Met This Shift     Problem: Pain:  Goal: Pain level will decrease  Description  Pain level will decrease  4/14/2019 1617 by Mariana Houston RN  Outcome: Met This Shift  4/14/2019 1147 by Mariana Houston RN  Outcome: Met This Shift  Goal: Control of acute pain  Description  Control of acute pain  4/14/2019 1617 by Mariana Houston RN  Outcome: Met This Shift  4/14/2019 1147 by Mariana Houston RN  Outcome: Met This Shift

## 2019-04-14 NOTE — PROGRESS NOTES
Neuro Science Intensive Care Unit  Critical Care  Daily Progress Note 4/14/2019    Date of Admission:4/11/19  CC:  Gracy Mireles Dawson  4/11/19 Presented to ED after being found down by . Intubated. Trauma consulted. Neurosurgery consulted. Taken to OR for stat craniotomy for SDH. Admitted to NSICU postoperatively. Intubated. 4/12/19 Had hypotension during night, vasopressors required. 4/13/19 Nicom applied. Fluid responsive. Levophed weaned off last evening. Zoll catheter inserted for cooling to normothermia. 4/14 Patient normothermic. Family meeting held with Dr. May Mercado. PHYSICAL EXAM:    /61   Pulse 74   Temp 97.7 °F (36.5 °C)   Resp 15   Ht 5' 6\" (1.676 m)   Wt 147 lb 11.2 oz (67 kg)   LMP 10/08/2011 (Approximate)   SpO2 100%   BMI 23.84 kg/m²     Intake/Output Summary (Last 24 hours) at 4/14/2019 0743  Last data filed at 4/14/2019 0600  Gross per 24 hour   Intake 4867.2 ml   Output 2225 ml   Net 2642.2 ml         General appearance:  Comfortable. NEUROLOGIC:    GCS:    1 - Does not open eyes   2 - Extensor response (decerebrate)  1 - Makes no noise      Pupil size:  Left 4 mm  Right 4 mm  Pupil reaction: No     Wiggles fingers: Left No Right No  Hand grasp:   Left Does not follow commands     Right:  Does not follow commands. Wiggles toes: Does not follow commands  Plantar flexion:Does not follow commands. Cough and gag reflexes present  Crani dressing with serosanguinous drainage. CONSTITUTIONAL: No acute distress. Non acute ecchymotic areas over left side of face, neck and shoulder. CARDIOVASCULAR: S1 S2, regular rate, regular rhythm,Monitor: SR  PULMONARY:  Intubated on Assist control. No rhonchi/rales/wheezes. RENAL:  Owens to gravity, clear yellow urine. ABDOMEN: Soft, nontender, nondistended, nontympanic, normal bowel sounds. OGT  With TF  MUSCULOSKELETAL: Arms extend to noxious stimuli, no turning of wrists. Random movement of LE. No LE edema. SKIN/EXTREMITIES: Skin warm and dry. IV ACCESS: R sublcavian D4.  R radial AL D4 Zoll catheter R femoral vein. D3      ASSESSMENT/PLAN:     Active Problems:    SDH (subdural hematoma) (HCC)    Acute respiratory failure (HCC)    Palliative care by specialist    Goals of care, counseling/discussion  Resolved Problems:    * No resolved hospital problems. *          Neuro:  AMS, SDH s/p crani for removal of SDH. Hx of SDH from fall. Neurosurgery following. Monitor neuro status. Hypertonic saline. CV Hypotension resolved. MAP goal <65 mm Hg. Nicom. Monitor hemodynamics. Pulm: Acute respiratory failure. Intubated on assist control. PF ratio 439. Does have spontaneous respirations over vent. Holding on PS dt potential increase in ICP. Monitor respiratory status. ABGs. CXR. Pulmicort. Performist.   GI: NPO. OGT. On TF. Pepcid. Monitor bowel function. Zofran. Renal: Hypophosphatemia. Replete and supplement. Hypocalcemia. Resolved. Monitor uop, renal function and electrolytes  ID:Leukocytosis. Febrile. Zoll cooling system. Will trial off Zoll today. APAP. Sputum cx with Staph and Ecoli. Changes Unasyn to Ancef for 4 more days. Endocrine:  Hyperglycemia. Monitor bs. MSK:. . 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 Heme: Anemia. Transfuse 1UPRBCs. Bowel regime: Colace. Senna. MOM. Lactulose. Pain control/Sedation:Fentanyl  DVT prophylaxis: SCDs. No chemical prophylaxis dt SDH  GI prophylaxis: Pepcid. TF  Glucose protocol: Follow labs  Mouth/Eye care: Tears and peridex  Owens: Keep in place for critical care monitoring of fluid balance. Consults: Trauma. Neurosurgery. Palliative care. Patient/Family update: Will update as family available    Code status:  Full      Disposition:  NSICU. Critical care time minutes 38.      ANT Mcmahan-CNP  4/14/2019  7:43 AM

## 2019-04-15 NOTE — PROGRESS NOTES
Palliative Care Department  Palliative Care Progress Note  Provider: [facility] days prior to Consult: Hospital Day: 5    Referring Provider:  Graciela Mason MD    Reason for Consult:  [x]  Code status Discussion  [x]  Assist with goals of care  [x]  Psychosocial support  []  Symptom Management      Chief Complaint: unresponsive at home    Subjective:   4/15/19  Chart reviewed, discussed with floor RN  Patient requiring intermittent Zoll for normothermia  Patient's  at the bedside    4/12/19  HPI:  Luis Fernando Macedo is a 60 y.o. female with significant past medical history of IBS, migraine, GERD, chronic back pain, endometriosis, OA who presented to the ED from home after being found unresponsive by her . Patient recently admitted to the [facility] ED then transfer to [facility] 4/3- 4/6, after a reported fall down stairs and a SDH 2.5 mm shift, facial fracture. She was then admitted to inpatient psych 4/6 - 4/10 due to suicidal ideation and was discharged home. The morning of the 11th, the patient's  came downstairs to find her unresponsive. She was admitted with worsening SDH with increasing shift. She had an emergent craniotomy 4/11/19 (left frontotemporoparietal). Overnight, patient had a temperature and hypotension requiring pressors. Repeat CT head completed today, results pending. Family is in the waiting room. Palliative encounter documented below.       Goals of care: continue current management and to be determined    Code Status: full code    Advanced Directives: no copy but reported she has completed them    Surrogate/Legal NOK: Spouse  Contacts:  Ferny Rodriguez    Spiritual assessment: No spiritual distress identified     Bereavement and grief: to be determined    Past Medical History:   Diagnosis Date    Asthma     Chronic back pain     Endometriosis     GERD (gastroesophageal reflux disease)     On  meds    Headache(784.0)     IBS [P12.9H1B] 04/11/2019    Acute respiratory failure (HCC) [J96.00]        SDH s/p craniotomy:  -  Neurologic recovery appears poor currently, follow    -  NICU, neurosurgery following    Palliative Care Encounter/Recommendations:  4/15/19  Follow-up visit with . He is awaiting results/further discussion with neurosurgery. Support provided to  at bedside, he does not identify additional needs currently. 4/12/19  Palliative medicine services introduced to patient's  and extended family present. Patient's husbands provides a detailed story preceding her first admission after her fall and medication changes he was concerned with he believed change Aylin's personality and effected her balance. Support was provided to the family by myself and palliative medicine LSW with active listening, empathy, and guidance for additional assistance. Family Meeting:  Participants:Spouse  Family meeting was held to discuss:diagnosis and goals of care     Discharge planning: to be determined    Patient meets criteria for general inpatient hospice care including the following: N/A- Palliative Care Patient    Data in Support of Terminal Illness:N/A Palliative Patient. Discussed patient and the plan of care with the other IDT members of Palliative Care and Dr. Ludwin Geller, and with patient, family and floor nurse. Referrals to: none today    Time/Communication  Greater than 51% of time spent, total 25 minutes in counseling and coordination of care at the bedside regarding goals of care, symptom management, diagnosis and prognosis and see above. Sacha Chakraborty PA-C  Palliative Medicine    Thank you for allowing Palliative Medicine to participate in the care of Dahlia Conn. Note: This report was completed using computerRealie voiced recognition software. Every effort has been made to ensure accuracy; however, inadvertent computerized transcription errors may be present.

## 2019-04-15 NOTE — PROGRESS NOTES
[facility]  Surgical Intensive Care Unit  Daily Progress Note        MECHANISM OF INJURY:  Fall down stairs, 2 weeks PTA    INJURIES:  Patient Active Problem List   Diagnosis    Major depression, chronic    Chronic back pain    COPD (chronic obstructive pulmonary disease) [facility] [address]    SDH (subdural hematoma) (HCC)    Acute respiratory failure ([facility] [address]    Palliative care by specialist    Goals of care, counseling/discussion       SURGICAL/INTERVENTIONAL PROCEDURES:   4/11/19 Presented to ED after being found down by . Intubated. Trauma consulted. Neurosurgery consulted. Taken to OR for stat craniotomy for SDH. Admitted to NSICU postoperatively. Intubated  4/12/19 Hypotensive and hyperthermic overnight. On Levo. GCS 3. S/p craniotomy   4/13/19 Intravascular active temperature management catheter inserted yesterday for persistent hyperthermia. Decerebrate posturing. 4/13/19 Decerebrate posturing in all extremities    4/14 Still decerebrate posturing - intermittent Zoll for normothermia    OVERNIGHT EVENTS:   Patient continues to require Zoll for normothermia, 2 doses of antihypertensives required, pupils unequal       PHYSICAL EXAM:  CONSTITUTIONAL:  Intubated, no sedation  EYES:  Left pupil 6mm and right 4mm  LUNGS:  CTAB  CARDIOVASCULAR:  RRR  ABDOMEN:  Soft, NT, ND  MUSCULOSKELETAL:  No edema  NEUROLOGIC:  GCS 4T      PROBLEMS/PLANS:    NEUROLOGIC:  PROBLEMS:  1. TBI, severe  2. H/o psychiatric disorder  PLAN:  1. C/w AED  2. NSG following  3. Home psychiatric meds resumed but at lower doses    SEDATION/ANALGESIA:  PRN analgesia    CARDIOVASCULAR:  PROBLEMS:  1. HTN  PLAN:  1. hemodynamic monitoring -  noninvasive -  continue  2. Home meds resumed    PULMONARY:  PROBLEMS:  1. acute respiratory failure -  hypoxemic  PLAN:  1. ABG  2.  CXR  3. mechanical ventilation -  continue  4. hyperinflation therapy -  continue  5. bronchopulmonary hygiene -  continue  6. bronchodilators - continue    RENAL/FLUID/ELECTROLYTE:  PROBLEMS:  1. Maintaining low to high normal sodium  PLAN:  1. barr catheter -  Continue, Continue barr catheter for managing strict I and Os in this critically ill patient. 2. Stop 3%  3. Start NaCl tabs    GI/NUTRITION:  PROBLEMS:  1. malnutrition  2. dysphagia  PLAN:  1. enteral nutrition -  continue    ID:  PROBLEMS:  1. MSSA and Ecoli tracheitis  PLAN:  1. C/w ancef    HEMATOLOGIC:  PROBLEMS:  1. acute blood loss anemia  PLAN:  1. CBC    ENDOCRINE:  PROBLEMS:  1. No active issues   PLAN:  1. No active issues       OTHER:  Hyperthermia--c/w zoll catheter--plans to liberate 4/16    PROPHYLAXIS:   Stress ulcer: Pepcid and carafate   VTE: SCDs      DISPOSITION:   Continue ICU    Prognosis poor  CC TIME:  I spent 42 min managing this patients critical issues which are a constant threat to life excluding time teaching and performing procedures.         Lance Yuen MD  4/15/2019  3:16 PM

## 2019-04-15 NOTE — PROGRESS NOTES
Neuro Science Intensive Care Unit  Critical Care Consult  Daily Progress Note 4/15/2019    Date of Admission: 4/11/19    EVENTS:   4/11/19 Presented to ED after being found down by . Intubated. Trauma consulted. Neurosurgery consulted. Taken to OR for stat craniotomy for SDH. Admitted to NSICU postoperatively. Intubated. 4/12/19 Had hypotension during night, vasopressors required. 4/13/19 Nicom applied. Fluid responsive. Levophed weaned off last evening. Zoll catheter inserted for cooling to normothermia. 4/14 Patient normothermic. Family meeting held with Dr. Guerrero Sethi. 4/15 Patient continues to require Zoll for normothermia, 2 doses of antihypertensives required, pupils unequal       PHYSICAL EXAM:    /67   Pulse 75   Temp 97.9 °F (36.6 °C)   Resp (!) 39   Ht 5' 6\" (1.676 m)   Wt 139 lb 14.4 oz (63.5 kg)   LMP 10/08/2011 (Approximate)   SpO2 100%   BMI 22.58 kg/m²     General appearance:  Comfortable. GCS:    1 - Does not open eyes   2 - Extensor response (decerebrate)  1-T - Makes no noise    Pupil size:  Left 6 mm  Right 4 mm  Pupil reaction: No  Wiggles fingers: Left No Right No  Hand grasp:   Left absent     Right absent  Wiggles toes: Left No    Right No  Plantar flexion: Left absent    Right absent    CONSTITUTIONAL: no acute distress, lying in hospital bed, no sedation  NEUROLOGIC: + cough, + Gag, posturing to stimuli, crani dressing CDI  CARDIOVASCULAR: S1 S2, regular rate, regular rhythm, no murmur/gallop/rub. Monitor: sinus  PULMONARY: no rhonchi/rales/wheezes, no use of accessory muscles, 450/12/40/5,   RENAL: barr to gravity, clear yellow urine  ABDOMEN: soft, nontender, nondistended, nontympanic, normal bowel sounds   SKIN/EXTREMITIES: no rashes/ecchymosis, no edema/clubbing, warm/dry, good capillary refill     LINES:   R sublcavian placed 4/12.     R radial Arterial placed 4/14  Zoll catheter placed 4/13   R femoral CVC placed 4/12    CONSULTS: Trauma  Palliative   Neurosurgery      ASSESSMENT/PLAN:       · Neuro:  SDH s/p crani for evacuation secondary to prior fall, Hx migraine, depression, chronic pain. Neurosurgery following, monitor neuro status, 3% with serial sodium, Keppra  · CV: No acute issues HX HTN, HLD. Monitor hemodynamics. BP goal < 150. PRN hydralazine & labetalol. Cozaar, crestor    · Pulm: Acute respiratory failure. Mechanical ventilation. AC. Monitor RR & SpO2. Pulmicort, Perforomist Daily CXR. Daily ABG. Anticipate trach  · GI: No acute issues. Gastric tube. NPO. Tube feeds. Monitor bowel function. Anticipate PEG  · Renal: No acute issues. Monitor BUN & Cr. Monitor electrolytes & replace as needed. Monitor urine output. · ID: Hyperthermia. Continue Zoll, Sputum demonstrating E Coli. Ancef day 2 with previous Unasyn   · Endocrine: No acute issues. Monitor BS. ISS. · MSK: Disuse syndrome. ROM. turn & reposition. Routine skin care  · Heme: No acute issues. Monitor CBC. Bowel regime: Colace, Chronulac, Senna, Glycolax  Pain control/Sedation: Fentanyl, Tylenol  DVT prophylaxis: SCDs. No Lovenox/heparin until ok with neurosurgery. GI prophylaxis: Pepcid, Carafate, TF  Mouth/Eye care: artificial tears. Peridex. Owens: Keep in place for critical care monitoring of fluid balance. Family update:  Will update when available   Code status:  Full  Disposition:  NSICU    Electronically signed by ANT Colon CNP on 4/15/2019 at 7:43 AM

## 2019-04-15 NOTE — PROGRESS NOTES
PO#4  Left craniotomy for Subdural Hematoma. Comatose & intubated. Breathing above the vent. Gag reflex +  Moving extremities to stimuli probably decerebrate posturing.   Probably Doll's eye reflex present  Will repeat Ct scan of brain in AM  Prognosis guarded

## 2019-04-15 NOTE — CARE COORDINATION
Met with  in hallway to offer emotional support and explain my role. Will follow and discuss transition of care planning if appropriate pending progress.

## 2019-04-15 NOTE — PROGRESS NOTES
[facility]  ATTENDING PHYSICIAN SURGERY PROGRESS NOTE     I have examined the patient, reviewed the record, and discussed the case with the APN/  Resident. I have reviewed all relevant labs and imaging data. The following summarizes my clinical findings and independent assessment. Chief Complaint   Patient presents with    Loss of Consciousness     Pt found unresponsive by son. Last seen at 2230. PT has no gag reflex, seen last week after falling has SDH       S:   4/11/19 Presented to ED after being found down by . Intubated. Trauma consulted. Neurosurgery consulted. Taken to OR for stat craniotomy for SDH. Admitted to NSICU postoperatively. Intubated  4/12/19 Hypotensive and hyperthermic overnight. On Levo. GCS 3. S/p craniotomy   4/13/19 Intravascular active temperature management catheter inserted yesterday for persistent hyperthermia. Decerebrate posturing. 4/13/19 Decerebrate posturing in all extremities    4/14 Still decerebrate posturing - intermittent Zoll for normothermia       O:  Physical Exam   Constitutional: She appears well-developed. HENT:   Head: Normocephalic. Scalp dressing in place    Eyes:   4mm non reactive    Pulmonary/Chest: Effort normal.   Abdominal: Soft. She exhibits no distension. Musculoskeletal: She exhibits no edema. Neurological:   GCS 4        Assessment   Active Problems:    SDH (subdural hematoma) (HCC)    Acute respiratory failure ([address]    Palliative care by specialist    Goals of care, counseling/discussion  Resolved Problems:    * No resolved hospital problems. *      Plan   Family meeting with Dr. Olmedo Even yesterday - waiting to see if any progress over the next few days   Management per ICU team  Bautista Place in place   On 3%   Staph aureus, GNR ( Pan sensitice)  in sputum- Abx per ICU   Prognosis poor.  Palliative on board   Spoke with  at bedside     Juan Carlos Wang MD

## 2019-04-16 NOTE — PLAN OF CARE
Problem: Risk for Impaired Skin Integrity  Goal: Tissue integrity - skin and mucous membranes  Description  Structural intactness and normal physiological function of skin and  mucous membranes.   4/16/2019 0309 by Endy Christensen RN  Outcome: Met This Shift  4/15/2019 2210 by Endy Christensen RN  Outcome: Met This Shift     Problem: Falls - Risk of:  Goal: Will remain free from falls  Description  Will remain free from falls  4/16/2019 0309 by Endy Christensen RN  Outcome: Met This Shift  4/15/2019 2210 by Endy Christensen RN  Outcome: Met This Shift  Goal: Absence of physical injury  Description  Absence of physical injury  4/16/2019 0309 by Endy Christensen RN  Outcome: Met This Shift  4/15/2019 2210 by Endy Christensen RN  Outcome: Met This Shift     Problem: Pain:  Goal: Pain level will decrease  Description  Pain level will decrease  4/16/2019 0309 by Endy Christensen RN  Outcome: Met This Shift  4/15/2019 2210 by Endy Christensen RN  Outcome: Met This Shift  Goal: Control of acute pain  Description  Control of acute pain  4/16/2019 0309 by Endy Christensen RN  Outcome: Met This Shift  4/15/2019 2210 by Endy Christensen RN  Outcome: Met This Shift     Problem: Gas Exchange - Impaired:  Goal: Levels of oxygenation will improve  Description  Levels of oxygenation will improve  Outcome: Met This Shift     Problem: Nutrition Deficit:  Goal: Ability to achieve adequate nutritional intake will improve  Description  Ability to achieve adequate nutritional intake will improve  Outcome: Met This Shift

## 2019-04-16 NOTE — PROGRESS NOTES
continue    RENAL/FLUID/ELECTROLYTE:  PROBLEMS:  1. Maintaining low to high normal sodium  PLAN:  1. barr catheter -  Continue, Continue barr catheter for managing strict I and Os in this critically ill patient. 2. c/w NaCl tabs  3. Check for CSW--Urine sodium, urine osmo, serum osmo    GI/NUTRITION:  PROBLEMS:  1. malnutrition  2. dysphagia  PLAN:  1. enteral nutrition -  continue    ID:  PROBLEMS:  1. MSSA and Ecoli tracheitis  PLAN:  1. C/w ancef    HEMATOLOGIC:  PROBLEMS:  1. acute blood loss anemia  PLAN:  1. CBC    ENDOCRINE:  PROBLEMS:  1. No active issues   PLAN:  1. No active issues       Poor prognosis    PROPHYLAXIS:   Stress ulcer: Pepcid and carafate   VTE: SCDs, heparin subq      DISPOSITION:   Continue ICU      CC TIME:  I spent 42 min managing this patients critical issues which are a constant threat to life excluding time teaching and performing procedures.         Omi Sanchez MD  4/16/2019  12:22 PM

## 2019-04-16 NOTE — PLAN OF CARE
Problem: Gas Exchange - Impaired:  Goal: Levels of oxygenation will improve  Description  Levels of oxygenation will improve  4/16/2019 0309 by Tutu Ernandez RN  Outcome: Met This Shift

## 2019-04-16 NOTE — PROGRESS NOTES
Palliative medicine    Chart and results reviewed. CT head same without improvement. Case discussed with NICU attending and RN.  at bedside and support provided. He verbalizes he is aware of the graveness of his wife's condition but he has antwan and is praying for a miracle. Palliative medicine will continue to follow and provide support.   Alejandra Espinoza PA-C

## 2019-04-16 NOTE — PROGRESS NOTES
Nutrition Assessment (Enteral Nutrition)    Type and Reason for Visit: Reassess    Nutrition Recommendations: Modify current Tube Feeding  EN recommendation: Immune Enhancing @45ml/hr to provide: 1080ml, 1620kcals, 101gm pro, 820ml water    Nutrition Assessment: Pt status remains unchanged at this time from a nutritional standpoint given continued NPO status, vent w/ need for EN. Will provide updated EN recommendations to better meet estimated needs. Malnutrition Assessment:  · Malnutrition Status: At risk for malnutrition  · Context: Acute illness or injury  · Findings of the 6 clinical characteristics of malnutrition (Minimum of 2 out of 6 clinical characteristics is required to make the diagnosis of moderate or severe Protein Calorie Malnutrition based on AND/ASPEN Guidelines):  1. Energy Intake-Less than or equal to 75% of estimated energy requirement, Greater than or equal to 5 days    2. Weight Loss-Unable to assess,    3. Fat Loss-No significant subcutaneous fat loss,    4. Muscle Loss-No significant muscle mass loss,    5. Fluid Accumulation-No significant fluid accumulation,    6.  Strength-Not measured    Nutrition Risk Level:  Moderate    Nutrition Needs:  · Estimated Daily Total Kcal: (PS3B Tmax 38.1, MV 8.15; 1565)  · Estimated Daily Protein (g):  (1.5-1.8gm/kg CBW)  Nutrition Diagnosis:   · Problem: Inadequate oral intake  · Etiology: related to Impaired respiratory function-inability to consume food     Signs and symptoms:  as evidenced by NPO status due to medical condition, Intubation, Nutrition support - EN    Objective Information:  · Nutrition-Focused Physical Findings: vent, OGT, soft abd, active BS, +3 facial edema, + I/Os   · Wound Type: Surgical Wound  · Current Nutrition Therapies:  · Oral Diet Orders: NPO   · Tube Feeding (TF) Orders:   · Feeding Route: Nasogastric  · Formula: Fluid Restricted  · Rate (ml/hr):40ml/hr    · Volume (ml/day): 960ml TV  · Duration: Continuous  · Water Flushes: 30ml Q4  · Current TF & Flush Orders Provides: 1920kcals, 80gm pro, 672ml water, 852ml total water   · Anthropometric Measures:  · Ht: 5' 6\" (167.6 cm)   · Current Body Wt: 136 lb (61.7 kg)(4/16 actual)  · Admission Body Wt: 138 lb (62.6 kg)(4/12 actual)  · Usual Body Wt: 135 lb (61.2 kg)(stated hx per EMR)  · Weight Change: MELCHOR wt changes at this time   · Ideal Body Wt: 130 lb (59 kg), % Ideal Body 104%  · BMI Classification: BMI 18.5 - 24.9 Normal Weight    Nutrition Interventions:   Continued Inpatient Monitoring, Education not appropriate at this time, Coordination of Care    Nutrition Evaluation:   · Evaluation: Goals set   · Goals:  Tolerance to EN    · Monitoring: TF Intake, TF Tolerance, Skin Integrity, I&O, Monitor Hemodynamic Status, Mental Status/Confusion, Pertinent Labs, Monitor Bowel Function, Weight      Electronically signed by Layvonne Kehr, RD, LD on 4/16/19 at 12:48 PM  Contact Number: [phone]

## 2019-04-16 NOTE — PROGRESS NOTES
4/13   R femoral CVC placed 4/12    CONSULTS:   Trauma  Palliative   Neurosurgery      ASSESSMENT/PLAN:       · Neuro:  SDH s/p crani for evacuation secondary to prior fall, Hx migraine, depression, chronic pain. Neurosurgery following, monitor neuro status,  Keppra  · CV: No acute issues HX HTN, HLD. Monitor hemodynamics. BP goal < 150. PRN hydralazine & labetalol. Cozaar, crestor    · Pulm: Acute respiratory failure. Mechanical ventilation. AC. Monitor RR & SpO2. Pulmicort, Perforomist Daily CXR. Daily ABG. Anticipate trach  · GI: No acute issues. Gastric tube. NPO. Tube feeds. Monitor bowel function. Anticipate PEG  · Renal: No acute issues. Monitor BUN & Cr. Monitor electrolytes & replace as needed. Monitor urine output. · ID: Hyperthermia - resolving. Sputum demonstrating E Coli. Ancef day 3 with previous Unasyn   · Endocrine: No acute issues. Monitor BS. ISS. · MSK: Disuse syndrome. ROM. turn & reposition. Routine skin care  · Heme: No acute issues. Monitor CBC. Bowel regime: Colace, Chronulac, Senna, Glycolax  Pain control/Sedation: Fentanyl, Tylenol  DVT prophylaxis: SCDs. No Lovenox/heparin until ok with neurosurgery. GI prophylaxis: Pepcid, Carafate, TF  Mouth/Eye care: artificial tears. Peridex. Owens: Keep in place for critical care monitoring of fluid balance. Family update:  Will update when available   Code status:  Full  Disposition:  NSICU    Electronically signed by ANT Matute CNP on 4/16/2019 at 9:14 AM

## 2019-04-16 NOTE — PLAN OF CARE
Problem: Inadequate oral food/beverage intake (NI-2.1)  Goal: Food and/or Nutrient Delivery  Description  Individualized approach for food/nutrient provision.   4/16/2019 1248 by Sue Sterling RD, DUSTY  Outcome: Met This Shift  4/16/2019 1247 by Sue Sterling RD, DUSTY  Outcome: Met This Shift

## 2019-04-16 NOTE — PLAN OF CARE
Problem: Risk for Impaired Skin Integrity  Goal: Tissue integrity - skin and mucous membranes  Description  Structural intactness and normal physiological function of skin and  mucous membranes.   4/16/2019 0730 by Rosanna Craig RN  Outcome: Met This Shift  4/16/2019 0729 by Rosanna Craig RN  Outcome: Met This Shift  4/16/2019 0309 by Ananth Malhotra RN  Outcome: Met This Shift  4/15/2019 2210 by Ananth Malhotra RN  Outcome: Met This Shift     Problem: Falls - Risk of:  Goal: Will remain free from falls  Description  Will remain free from falls  4/16/2019 0730 by Rosanna Craig RN  Outcome: Met This Shift  4/16/2019 0729 by Rosanna Craig RN  Outcome: Met This Shift    Problem: Pain:  Goal: Pain level will decrease  Description  Pain level will decrease  Outcome: Met This Shift  Outcome: Met This Shift     Problem: Gas Exchange - Impaired:  Goal: Levels of oxygenation will improve  Description  Levels of oxygenation will improve  Outcome: Met This Shift

## 2019-04-17 NOTE — PROGRESS NOTES
hypoxemic  PLAN:  1. ABG  2. CXR  3. mechanical ventilation -  continue  4. hyperinflation therapy -  continue  5. bronchopulmonary hygiene -  continue  6. bronchodilators -  continue    RENAL/FLUID/ELECTROLYTE:  PROBLEMS:  1. Cerebral salt wasting  PLAN:  1. barr catheter -  Continue, Continue barr catheter for managing strict I and Os in this critically ill patient. 2. c/w NaCl tabs  3. 3% bolus  4. Start fludrocortisone    GI/NUTRITION:  PROBLEMS:  1. malnutrition  2. dysphagia  PLAN:  1. enteral nutrition -  continue    ID:  PROBLEMS:  1. MSSA and Ecoli tracheitis  PLAN:  1. C/w ancef    HEMATOLOGIC:  PROBLEMS:  1. acute blood loss anemia  PLAN:  1. CBC    ENDOCRINE:  PROBLEMS:  1. No active issues   PLAN:  1. No active issues       Poor prognosis    PROPHYLAXIS:   Stress ulcer: Pepcid and carafate   VTE: SCDs, heparin subq      DISPOSITION:   Continue ICU      CC TIME:  I spent 38 min managing this patients critical issues which are a constant threat to life excluding time teaching and performing procedures.         Gin Chan MD  4/17/2019  3:42 PM

## 2019-04-17 NOTE — PLAN OF CARE
Problem: Risk for Impaired Skin Integrity  Goal: Tissue integrity - skin and mucous membranes  Description  Structural intactness and normal physiological function of skin and  mucous membranes. Outcome: Met This Shift     Problem: Falls - Risk of:  Goal: Will remain free from falls  Description  Will remain free from falls  Outcome: Met This Shift  Goal: Absence of physical injury  Description  Absence of physical injury  Outcome: Met This Shift     Problem: Pain:  Goal: Pain level will decrease  Description  Pain level will decrease  Outcome: Met This Shift  Goal: Control of acute pain  Description  Control of acute pain  Outcome: Met This Shift     Problem: Inadequate oral food/beverage intake (NI-2.1)  Goal: Food and/or Nutrient Delivery  Description  Individualized approach for food/nutrient provision.   4/16/2019 1248 by Choco Collado RD, DUSTY  Outcome: Met This Shift  4/16/2019 1247 by Choco Collado RD, DUSTY  Outcome: Met This Shift     Problem: Gas Exchange - Impaired:  Goal: Levels of oxygenation will improve  Description  Levels of oxygenation will improve  Outcome: Met This Shift     Problem: Nutrition Deficit:  Goal: Ability to achieve adequate nutritional intake will improve  Description  Ability to achieve adequate nutritional intake will improve  Outcome: Met This Shift

## 2019-04-17 NOTE — PROGRESS NOTES
LSW for Palliative Medicine updated with bedside RN.  initially present at bedside but had left. Pt's family continues to meet with physicians.

## 2019-04-17 NOTE — PROGRESS NOTES
PO#6  Left craniotomy for Subdural Hematoma. Comatose & intubated. Breathing above the vent. Gag reflex +  Moving extremities to stimuli probably decerebrate posturing. Probably Doll's eye reflex present. Spoke with family.   Will repeat CT scan Friday AM & make further prognostic recommendations

## 2019-04-17 NOTE — PROGRESS NOTES
[facility]uro [facility] Intensive Care Unit  Critical Care  Daily Progress Note 4/17/2019    Date of Admission:4/11/19  CC:  Gr[facility]  4/11/19 Presented to ED after being found down by . Intubated. Trauma consulted. Neurosurgery consulted. Taken to OR for stat craniotomy for SDH. Admitted to NSICU postoperatively. Intubated. 4/12/19 Had hypotension during night, vasopressors required. 4/13/19 Nicom applied. Fluid responsive. Levophed weaned off last evening. Zoll catheter inserted for cooling to normothermia. 4/14 Patient normothermic. Family meeting held with Dr. Lucy Burdick. 4/15 Patient continues to require Zoll for normothermia, 2 doses of antihypertensives required, pupils unequal   4/16 Zoll paused, Vitals stable, repeat CT this AM, small BM this AM      PHYSICAL EXAM:    BP 97/61   Pulse 86   Temp 101.5 °F (38.6 °C) (Bladder)   Resp 18   Ht 5' 6\" (1.676 m)   Wt 129 lb 11.2 oz (58.8 kg)   LMP 10/08/2011 (Approximate)   SpO2 100%   BMI 20.93 kg/m²     Intake/Output Summary (Last 24 hours) at 4/17/2019 0710  Last data filed at 4/17/2019 0700  Gross per 24 hour   Intake 1652 ml   Output 2170 ml   Net -518 ml         General appearance:  Comfortable. NEUROLOGIC:    GCS:    1 - Does not open eyes   2 - Extensor response (decerebrate)  1 - Makes no noise      Pupil size:  Left 5 mm  Right 4 mm  Pupil reaction: No     Wiggles fingers: Left No Right No  Hand grasp:   Left Does not follow commands     Right:  Does not follow commands. Wiggles toes: Does not follow commands  Plantar flexion:Does not follow commands. Cough and gag reflexes present  Crani dressing with serosanguinous drainage. CONSTITUTIONAL: No acute distress. Non acute ecchymotic areas over left side of face, neck and shoulder. CARDIOVASCULAR: S1 S2, regular rate, regular rhythm,Monitor: SR  PULMONARY:  Intubated on Assist control. No rhonchi/rales/wheezes. RENAL:  Owens to gravity, clear yellow urine. ABDOMEN: Soft, nontender, nondistended, nontympanic, normal bowel sounds. OGT  With TF  MUSCULOSKELETAL: Arms extend to noxious stimuli, no turning of wrists. . No LE edema. SKIN/EXTREMITIES: Skin warm and dry. IV ACCESS: R sublcavian D6  R radial AL D4 Zoll catheter R femoral vein. D6       ASSESSMENT/PLAN:     Active Problems:    SDH (subdural hematoma) (HCC)    Acute respiratory failure (HCC)    Palliative care by specialist    Goals of care, counseling/discussion  Resolved Problems:    * No resolved hospital problems. *          Neuro:  AMS, SDH s/p crani for removal of SDH. Hx of SDH from fall. Neurosurgery following. Monitor neuro status. Hypertonic saline. CV No acute issues. BP goal <160mmHg. Monitor hemodynamics. Pulm: Acute respiratory failure. Intubated on assist control. PF ratio 419. Monitor respiratory status. ABGs. CXR. Pulmicort. Performist.   GI: NPO. OGT. On TF. Pepcid. Monitor bowel function. Zofran. Renal: Cerebral salt wasting. Florinef. Hypertonic saline. Salt tablets. Monitor uop, renal function and electrolytes  ID: No leukocytosis. Febrile. .  APAP. Endocrine:  Hyperglycemia. Monitor bs. MSK:. No spontaneous movement. Repositioning. Heme: Anemia. Monitor CBC    Bowel regime: Colace. Senna. MOM. Lactulose. Pain control/Sedation:Fentanyl  DVT prophylaxis: SCDs. No chemical prophylaxis dt SDH  GI prophylaxis: Pepcid. TF  Glucose protocol: Follow labs  Mouth/Eye care: Tears and peridex  Owens: Keep in place for critical care monitoring of fluid balance. Consults: Trauma. Neurosurgery. Palliative care. Patient/Family update: Will update as family available    Code status:  Full      Disposition:  NSICUKaushik WrayANT-CNP  4/17/2019  7:10 AM

## 2019-04-18 NOTE — PROGRESS NOTES
[facility]  Surgical Intensive Care Unit  Daily Progress Note        MECHANISM OF INJURY:  Fall down stairs, 2 weeks PTA    INJURIES:  Patient Active Problem List   Diagnosis    Major depression, chronic    Chronic back pain    COPD [facility]ronic obstructive pulmonary disease) ([facility]    SDH (subdural hematoma) (HCC)    Acute respiratory failure ([facility]    Palliative care by specialist    Goals of care, counseling/discussion       SURGICAL/INTERVENTIONAL PROCEDURES:   4/11/19 Presented to ED after being found down by . Intubated. Trauma consulted. Neurosurgery consulted. Taken to OR for stat craniotomy for SDH. Admitted to NSICU postoperatively. Intubated  4/12/19 Hypotensive and hyperthermic overnight. On Levo. GCS 3. S/p craniotomy   4/13/19 Intravascular active temperature management catheter inserted yesterday for persistent hyperthermia. Decerebrate posturing. 4/13/19 Decerebrate posturing in all extremities    4/14 Still decerebrate posturing - intermittent Zoll for normothermia  4/15 3% discontinued  4/16 Zoll discontinued  4/17  called--he is awaiting until Friday CT scan to make decision on trach/PEG or extubation. He does not think she would want a trach/PEG; CSW--fludrocortisone started    OVERNIGHT EVENTS:   Fevers returned to 102.6 early yesterday evening, but nothing overnight      PHYSICAL EXAM:  CONSTITUTIONAL:  Intubated, no sedation  EYES:  Left pupil 4mm and right 4mm both nonreactive  LUNGS:  CTAB  CARDIOVASCULAR:  RRR  ABDOMEN:  Soft, NT, ND  MUSCULOSKELETAL:  No edema  NEUROLOGIC:  GCS 4T, + cough + gag      PROBLEMS/PLANS:    NEUROLOGIC:  PROBLEMS:  1. TBI, severe  2. H/o psychiatric disorder  PLAN:  1. C/w AED  2. NSG following  3. Home psychiatric meds resumed but at lower doses--wean weekly    SEDATION/ANALGESIA:  PRN analgesia  neurontin     CARDIOVASCULAR:  PROBLEMS:  1. HTN  PLAN:  1. hemodynamic monitoring -  noninvasive -  continue  2.  Home meds resumed    PULMONARY:  PROBLEMS:  1. acute respiratory failure -  hypoxemic  PLAN:  1. ABG  2. CXR  3. mechanical ventilation -  continue  4. hyperinflation therapy -  continue  5. bronchopulmonary hygiene -  continue  6. bronchodilators -  continue    RENAL/FLUID/ELECTROLYTE:  PROBLEMS:  1. Cerebral salt wasting  2. Acute hyponatremia--improved  PLAN:  1. barr catheter -  Continue, Continue barr catheter for managing strict I and Os in this critically ill patient. 2. c/w NaCl tabs  3. C/w fludrocortisone    GI/NUTRITION:  PROBLEMS:  1. malnutrition  2. dysphagia  PLAN:  1. enteral nutrition -  continue    ID:  PROBLEMS:  1. MSSA and Ecoli tracheitis  PLAN:  1. Ancef--completed    HEMATOLOGIC:  PROBLEMS:  1. acute blood loss anemia  PLAN:  1. CBC    ENDOCRINE:  PROBLEMS:  1. No active issues   PLAN:  1. No active issues       Poor prognosis    PROPHYLAXIS:   Stress ulcer: Pepcid and carafate   VTE: SCDs, heparin subq      DISPOSITION:   Continue ICU      CC TIME:  I spent 31 min managing this patients critical issues which are a constant threat to life excluding time teaching and performing procedures.         Renay Ziegler MD  4/18/2019  2:30 PM

## 2019-04-18 NOTE — PLAN OF CARE
Problem: Risk for Impaired Skin Integrity  Goal: Tissue integrity - skin and mucous membranes  Description  Structural intactness and normal physiological function of skin and  mucous membranes.   4/18/2019 1940 by Rishi De Anda RN  Outcome: Met This Shift     Problem: Falls - Risk of:  Goal: Will remain free from falls  Description  Will remain free from falls  4/18/2019 1940 by Rishi De Anda RN  Outcome: Met This Shift     Problem: Falls - Risk of:  Goal: Absence of physical injury  Description  Absence of physical injury  4/18/2019 1940 by Rishi De Anda RN  Outcome: Met This Shift     Problem: Gas Exchange - Impaired:  Goal: Levels of oxygenation will improve  Description  Levels of oxygenation will improve  4/18/2019 1940 by Rishi De Anda RN  Outcome: Met This Shift     Problem: Nutrition Deficit:  Goal: Ability to achieve adequate nutritional intake will improve  Description  Ability to achieve adequate nutritional intake will improve  4/18/2019 1940 by Rishi De Anda RN  Outcome: Met This Shift  4/18/2019 1818 by Dania Chandler RN  Outcome: Met This Shift  4/18/2019 1434 by Dania Chandler RN  Outcome: Met This Shift

## 2019-04-18 NOTE — PROGRESS NOTES
PCP is Dr. Bandar Noel. Office notified of admission.       Electronically signed by Onel Tenorio RN MSN APRN-NP [facility] NP  [address] 4/18/2019 2:25 PM

## 2019-04-18 NOTE — CARE COORDINATION
Met with  in room. He is aware of pt's prognosis. He states he is waiting til rpt head Ct is done tomorrow and discussion re: results with Dr Osman De La Paz to determine what \"transpires\". He states he is ready to make the \" tough decision\" if the results are poor. He states he knows that his wife would not want to live on machines. Provided  emotional support. He states he has family support from his sisters and pt's sisters. He is keeping them aware of her poor status. Will be available to  for support as needed.

## 2019-04-18 NOTE — PLAN OF CARE
Problem: Risk for Impaired Skin Integrity  Goal: Tissue integrity - skin and mucous membranes  Description  Structural intactness and normal physiological function of skin and  mucous membranes.   Outcome: Met This Shift     Problem: Falls - Risk of:  Goal: Absence of physical injury  Description  Absence of physical injury  Outcome: Met This Shift

## 2019-04-18 NOTE — PROGRESS NOTES
Neuro Science Intensive Care Unit  Critical Care  Daily Progress Note 4/18/2019    Date of Admission:4/11/19  CC:  [facility]  4/11/19 Presented to ED after being found down by . Intubated. Trauma consulted. Neurosurgery consulted. Taken to OR for stat craniotomy for SDH. Admitted to NSICU postoperatively. Intubated. 4/12/19 Had hypotension during night, vasopressors required. 4/13/19 Nicom applied. Fluid responsive. Levophed weaned off last evening. Zoll catheter inserted for cooling to normothermia. 4/14 Patient normothermic. Family meeting held with Dr. Queenie Fleming. 4/15 Patient continues to require Zoll for normothermia, 2 doses of antihypertensives required, pupils unequal   4/16 Zoll paused, Vitals stable, repeat CT this AM, small BM this AM  4/17 Removed Zoll and TLC from right femoral site. PHYSICAL EXAM:    BP 97/61   Pulse 75   Temp 98.6 °F (37 °C)   Resp 17   Ht 5' 6\" (1.676 m)   Wt 138 lb 9.6 oz (62.9 kg)   LMP 10/08/2011 (Approximate)   SpO2 100%   BMI 22.37 kg/m²     Intake/Output Summary (Last 24 hours) at 4/18/2019 0734  Last data filed at 4/18/2019 0600  Gross per 24 hour   Intake 843 ml   Output 1935 ml   Net -1092 ml         General appearance:  Comfortable. NEUROLOGIC:    GCS:    1 - Does not open eyes   2 - Extensor response (decerebrate)  1 - Makes no noise      Pupil size:  Left 4 mm  Right 4 mm  Pupil reaction: No     Wiggles fingers: Left No Right No  Hand grasp:   Left Does not follow commands     Right:  Does not follow commands. Wiggles toes: Does not follow commands  Plantar flexion:Does not follow commands. Cough and gag reflexes present  Crani dressing with serosanguinous drainage. CONSTITUTIONAL: No acute distress. Non acute ecchymotic areas over left side of face, neck and shoulder. CARDIOVASCULAR: S1 S2, regular rate, regular rhythm,Monitor: SR  PULMONARY:  Intubated on Assist control. No rhonchi/rales/wheezes.   RENAL: Owens to gravity, clear yellow urine. ABDOMEN: Soft, nontender, nondistended, nontympanic, normal bowel sounds. OGT  With TF  MUSCULOSKELETAL: Arms extend to noxious stimuli, no turning of wrists. . No LE edema. SKIN/EXTREMITIES: Skin warm and dry. IV ACCESS: R sublcavian D7  R radial AL D5    ASSESSMENT/PLAN:     Active Problems:    SDH (subdural hematoma) (HCC)    Acute respiratory failure (HCC)    Palliative care by specialist    Goals of care, counseling/discussion  Resolved Problems:    * No resolved hospital problems. *          Neuro:  AMS, SDH s/p crani for removal of SDH. Hx of SDH from fall. Neurosurgery following. Monitor neuro status. CV No acute issues. BP goal <160mmHg. Monitor hemodynamics. Pulm: Acute respiratory failure. Intubated on assist control. PF ratio 419. Monitor respiratory status. ABGs. CXR. Pulmicort. Performist.   GI: NPO. OGT. On TF. Pepcid. Monitor bowel function. Zofran. Renal: Cerebral salt wasting improved. . Florinef. Salt tablets. Monitor uop, renal function and electrolytes  ID: No leukocytosis. Afebrile. APAP. Endocrine:  Hyperglycemia. Monitor bs. MSK:. No spontaneous movement. Repositioning. Heme: Anemia. Monitor CBC    Bowel regime: Colace. Senna. MOM. Lactulose. Pain control/Sedation:Fentanyl  DVT prophylaxis: SCDs. No chemical prophylaxis dt SDH  GI prophylaxis: Pepcid. TF  Glucose protocol: Follow labs  Mouth/Eye care: Tears and peridex  Owens: Keep in place for critical care monitoring of fluid balance. Consults: Trauma. Neurosurgery. Palliative care. Patient/Family update: Will update as family available    Code status:  Full      Disposition:  NSICUKaushik WrayANT-CNP  4/18/2019  7:34 AM

## 2019-04-19 NOTE — PROGRESS NOTES
[facility]  Surgical Intensive Care Unit  Daily Progress Note        MECHANISM OF INJURY:  Fall down stairs, 2 weeks PTA    INJURIES:  Patient Active Problem List   Diagnosis    Major depression, chronic    Chronic back pain    [facility] obstructive [facility] disease) ([facility] [address]    SDH (subdural hematoma) (HCC)    Acute respiratory failure ([facility] [address]    Palliative care by specialist    Goals of care, counseling/discussion       SURGICAL/INTERVENTIONAL PROCEDURES:   4/11/19 Presented to ED after being found down by . Intubated. Trauma consulted. Neurosurgery consulted. Taken to OR for stat craniotomy for SDH. Admitted to NSICU postoperatively. Intubated  4/12/19 Hypotensive and hyperthermic overnight. On Levo. GCS 3. S/p craniotomy   4/13/19 Intravascular active temperature management catheter inserted yesterday for persistent hyperthermia. Decerebrate posturing. 4/13/19 Decerebrate posturing in all extremities    4/14 Still decerebrate posturing - intermittent Zoll for normothermia  4/15 3% discontinued  4/16 Zoll discontinued  4/17  called--he is awaiting until Friday CT scan to make decision on trach/PEG or extubation. He does not think she would want a trach/PEG; CSW--fludrocortisone started    OVERNIGHT EVENTS:   Fevers returned to 102.6 early yesterday evening, but nothing overnight      PHYSICAL EXAM:  CONSTITUTIONAL:  Intubated, no sedation  EYES:  Left pupil 4mm and right 4mm both nonreactive  LUNGS:  CTAB  CARDIOVASCULAR:  RRR  ABDOMEN:  Soft, NT, ND  MUSCULOSKELETAL:  No edema  NEUROLOGIC:  GCS 4T, + cough + gag      PROBLEMS/PLANS:    NEUROLOGIC:  PROBLEMS:  1. TBI, severe  2. H/o psychiatric disorder  PLAN:  1. C/w AED  2. NSG following  3. Home psychiatric meds resumed but at lower doses--wean weekly    SEDATION/ANALGESIA:  PRN analgesia  neurontin     CARDIOVASCULAR:  PROBLEMS:  1. HTN  PLAN:  1. hemodynamic monitoring -  noninvasive -  continue  2.  Home meds resumed    PULMONARY:  PROBLEMS:  1. acute respiratory failure -  hypoxemic  PLAN:  1. ABG  2. CXR  3. mechanical ventilation -  continue  4. hyperinflation therapy -  continue  5. bronchopulmonary hygiene -  continue  6. bronchodilators -  continue    RENAL/FLUID/ELECTROLYTE:  PROBLEMS:  1. Cerebral salt wasting  2. Acute hyponatremia--improved  PLAN:  1. barr catheter -  Continue, Continue barr catheter for managing strict I and Os in this critically ill patient. 2. c/w NaCl tabs  3. C/w fludrocortisone    GI/NUTRITION:  PROBLEMS:  1. malnutrition  2. dysphagia  PLAN:  1. enteral nutrition -  continue    ID:  PROBLEMS:  1. MSSA and Ecoli tracheitis  PLAN:  1. Ancef--completed    HEMATOLOGIC:  PROBLEMS:  1. acute blood loss anemia  PLAN:  1. CBC    ENDOCRINE:  PROBLEMS:  1. No active issues   PLAN:  1. No active issues       Poor prognosis    PROPHYLAXIS:   Stress ulcer: Pepcid and carafate   VTE: SCDs, heparin subq      DISPOSITION:   Continue ICU      CC TIME:  I spent 31 min managing this patients critical issues which are a constant threat to life excluding time teaching and performing procedures.       Family planning for withdrawal of care--[employer] to approach      Stella Steinberg MD  4/19/2019  11:56 AM

## 2019-04-19 NOTE — PROGRESS NOTES
Dr Rabia Carter in room to discuss CT results. Family questions answered.  plans on extubating patient. Will let this RN know when ready. Mohinder Rousseau, notified and is headed up to patient room.

## 2019-04-19 NOTE — PROGRESS NOTES
Neuro Science Intensive Care Unit  Critical Care  Daily Progress Note 4/19/2019    Date of Admission: 04/11/2019    CC:  Follow for SDH. HOSPITAL COURSE/OVERNIGHT EVENTS:    04/11  Presented to ED after being found down by . Intubated. Trauma consulted. Neurosurgery consulted. Taken to OR for stat craniotomy for SDH. Admitted to NSICU postoperatively. Intubated. 04/12   Had hypotension during night, vasopressors required. 04/13   Nicom applied. Fluid responsive. Levophed weaned off last evening. Zoll catheter inserted for cooling to normothermia.    04/14   Patient normothermic. Family meeting held with Dr. Lucy Burdick.   04/15   Patient continues to require Zoll for normothermia, 2 doses of antihypertensives required, pupils unequal   04/16   Zoll paused, Vitals stable, repeat CT this AM, small BM this AM  04/17  Removed Zoll and TLC from right femoral site. 04/19  T max 99.9 F. Remains on ventilator. No new issues overnight. PHYSICAL EXAM:    BP 97/61   Pulse 73   Temp 100.2 °F (37.9 °C) (Bladder)   Resp 19   Ht 5' 6\" (1.676 m)   Wt 126 lb 6.4 oz (57.3 kg)   LMP 10/08/2011 (Approximate)   SpO2 100%   BMI 20.40 kg/m²     Intake/Output Summary (Last 24 hours) at 4/19/2019 0948  Last data filed at 4/19/2019 0800  Gross per 24 hour   Intake 1782 ml   Output 1895 ml   Net -113 ml     General appearance:  Comfortable. NEUROLOGIC:   RASS Score:  - 4  GCS:    1 - Does not open eyes   3 - Flexor response (decorticate)  1 - Makes no noise     Corneal reflex present. Gag reflex absent  Cough reflex present. Pupil size:  Left 3 mm  Right 4 mm  Pupil reaction: No    Wiggles fingers: Left No Right No  Hand grasp:   Left none     Right none  Wiggles toes: Left No    Right No  Plantar flexion: Left none    Right none  Left facial swelling & large ecchymosis. Left cheek edematous. Crani dressing CDI. CONSTITUTIONAL: No acute distress, lying in hospital bed.     CARDIOVASCULAR: S1 S2, hydralzine & labetalol. Statin. Losartan. Pulm: Acute respiratory failure - Intubated. Mechanical ventilation:  VT:400 ml. Mode: AC. Rate:12 bpm.  FiO2: 40%. Peep 5 cm. .Monitor RR & SpO2. PRN Albuterol. Performist. .  Daily CXR & ABG. Pulmonary hygiene. GI: Inability to eat. BMI 21  -  Monitor bowel function. Gastric tube. Tube feedings:  Immune enhancing at 45 ml per hour with daily protein modular supplement. Zofran. Bowel regime. Renal:  Cerebral salt wasting - Monitor Na level, BUN & Cr. Monitor electrolytes & replace as needed. Monitor I & O. Owens. Florinef. ID: No acute issues - Monitor for sepsis. Endocrine: No acute issues - Monitor BS.   MSK:  Deconditioned. Hx of OA - ROM. Turn & reposition. PRAFOs. Monitor for skin breakdown. Heme: No acute issues  - Monitor CBC. Bowel regime: Lactulose. Glycolax. Senna. Pain control/Sedation:  Tylenol-scheduled. DVT prophylaxis: SCDs. No Lovenox/heparin due to SDH    GI prophylaxis: Pepcid. TF. Mouth/Eye care:  Artificial tears. Peridex. Owens: Keep in place for critical care monitoring of fluid balance. Ancillary consults:    Neurosurgery  Palliative Care. Medicine. Patient/Family update: Will update when available. Code status:  Full    Disposition:  NSICU.     Electronically signed by Vickie Keen RN MSN APRN-NP [facility] NP  CCNS CCRN 4/19/2019 11:32 AM

## 2019-04-19 NOTE — PROGRESS NOTES
PO#7  Left craniotomy for Subdural Hematoma. Comatose & intubated. Breathing above the vent. Gag reflex +  Moving extremities to stimuli probably decerebrate posturing. No significant improvement over the last 4 days. Spoke with family. CT scan done today reviewed. No significant improvement. Spoke with family. Poor prognosis expected. Family is leaning towards Hospice.

## 2019-04-19 NOTE — PROGRESS NOTES
contacted per [facility]; information provided. [facility] notified that they need to contact the  for further instructions as to the care of the patient.

## 2019-04-20 NOTE — PROGRESS NOTES
Neuro Science Intensive Care Unit  Critical Care  Daily Progress Note 4/20/2019    Date of Admission: 04/11/2019    CC:  Follow for SDH. HOSPITAL COURSE/OVERNIGHT EVENTS:    04/11  Presented to ED after being found down by . Intubated. Trauma consulted. Neurosurgery consulted. Taken to OR for stat craniotomy for SDH. Admitted to NSICU postoperatively. Intubated. 04/12   Had hypotension during night, vasopressors required. 04/13   Nicom applied. Fluid responsive. Levophed weaned off last evening. Zoll catheter inserted for cooling to normothermia.    04/14   Patient normothermic. Family meeting held with Dr. Monta Carrel.   04/15   Patient continues to require Zoll for normothermia, 2 doses of antihypertensives required, pupils unequal   04/16   Zoll paused, Vitals stable, repeat CT this AM, small BM this AM  04/17  Removed Zoll and TLC from right femoral site. 04/19  T max 99.9 F. Remains on ventilator. No new issues overnight. Family requested organ donation. Required fluid bolus for urine output. 04/20 No new issues. PHYSICAL EXAM:    /66   Pulse 74   Temp 98.9 °F (37.2 °C) (Core)   Resp 20   Ht 5' 6\" (1.676 m)   Wt 137 lb 11.2 oz (62.5 kg)   LMP 10/08/2011 (Approximate)   SpO2 100%   BMI 22.23 kg/m²     Intake/Output Summary (Last 24 hours) at 4/20/2019 1025  Last data filed at 4/20/2019 0800  Gross per 24 hour   Intake 1917 ml   Output 2065 ml   Net -148 ml     General appearance:  Comfortable. NEUROLOGIC:   RASS Score:  - 4  GCS:  5T  1 - Does not open eyes   3 - Flexor response (decorticate)  1 - Makes no noise     Corneal reflex present. Gag reflex absent  Cough reflex present. Pupil size:  Left 3 mm  Right 4 mm  Pupil reaction: No    Wiggles fingers: Left No Right No  Hand grasp:   Left none     Right none  Wiggles toes: Left No    Right No  Plantar flexion: Left none    Right none  Left facial swelling & large ecchymosis. Left cheek edematous.     Crani incision CDI.      CONSTITUTIONAL: No acute distress, lying in hospital bed. CARDIOVASCULAR: S1 S2, regular rate, regular rhythm, no murmur/gallop/rub. Monitor: NSR  PULMONARY: Bilaterally clear. No rhonchi/rales/wheezes, no use of accessory muscles. Intubated. Mechanical ventilation:  VT:400 ml. Mode: AC. Rate:12 bpm.  FiO2: 40%. Peep 5 cm. . PF ratio: 345. Minimal secretions. RENAL: Owens to gravity, clear yellow urine. Fluid balance for previous 24 hours:  - 148  ml. ABDOMEN: Soft, nontender, nondistended, nontympanic, normal bowel sounds. OGT. Tube feedings:  Immune enhancing at 45 ml per hour with daily protein modular supplement. MUSCULOSKELETAL:  Decorticate posturing with pain in all extremities. Right hand swollen. SKIN/EXTREMITIES: No rashes/ecchymosis, no edema/clubbing, warm/dry, good capillary refill. LINES:   Left radial  arterial line. Day 7. Right subclavian  Day 9. Recent Labs     04/20/19  0015 04/20/19  0630 04/20/19  0805   WBC 10.8 11.3 10.6   HGB 7.1* 6.9* 6.8*   HCT 23.2* 22.5* 22.2*   MCV 92.1 91.8 92.1   * 479* 481*       Recent Labs     04/20/19  0400 04/20/19  0630 04/20/19  0805    146 144   K 3.8 3.8 3.8   CO2 28 27 28   PHOS 4.6* 4.4 4.2   BUN 14 13 13   CREATININE 0.4* 0.4* 0.5     Recent Labs     04/19/19  1400 04/20/19  0015 04/20/19  0630 04/20/19  0805   PROT 6.1* 5.7* 5.8* 5.5*   INR 1.1 1.2  --  1.2     ASSESSMENT/PLAN:       Active Problems:    SDH (subdural hematoma) (HCC)    Acute respiratory failure ([facility] [address]    Palliative care by specialist    Goals of care, counseling/discussion  Resolved Problems:    * No resolved hospital problems. *    Neuro:  AMS.  SDH. S/P Craniotomy for SDH removal. Hx of SDH due to fall, depression & chronic back pain - Monitor neuro status. Neurosurgery following. Keppra for seizure prophylaxis. Lexapro. Gabapentin. Seroquel. CV: No acute issues - Monitor hemodynamics.   BP goal systolic less

## 2019-04-20 NOTE — PROGRESS NOTES
PO#8  Left craniotomy for Subdural Hematoma. Comatose & intubated. Breathing above the vent. Gag reflex +  Moving extremities to stimuli probably decerebrate posturing. No significant improvement over the last 5 days. Spoke with family. Family is leaning towards Hospice.   Family considering organ donation

## 2019-04-20 NOTE — PROCEDURES
Arterial Line Placement Procedure Note                     Indication: Need for serial blood work    Consent: Unable to be obtained due to patient's condition. Procedure: The skin over the right femoral artery was prepped with betadine and draped in a sterile fashion. Local anesthesia was obtained by infiltration using 1% Lidocaine without epinephrine. A arterial line catheter was then inserted, using a modified Seldinger technique, into the vessel. The transducer set was then attached and securely fastened to the skin with sutures. Waveforms on the monitor were observed and found to be adequate. The patient had good distal perfusion after the procedure. The site was then dressed in a sterile fashion. The patient tolerated the procedure well.      Complications: None

## 2019-04-20 NOTE — PROGRESS NOTES
Patients MAP < 65, addressed with [employer], they suggest to 1L bolus, made intensivist aware, orders placed.

## 2019-04-20 NOTE — PROGRESS NOTES
[facility]  Surgical Intensive Care Unit  Daily Progress Note        MECHANISM OF INJURY:  Fall down stairs, 2 weeks PTA    INJURIES:  Patient Active Problem List   Diagnosis    Major depression, chronic    Chronic back pain    COPD [facility] obstructive pulmonary disease) ([facility] [address]    SDH (subdural hematoma) (HCC)    Acute respiratory failure ([facility] [address]    Palliative care by specialist    Goals of care, counseling/discussion       SURGICAL/INTERVENTIONAL PROCEDURES:   4/11/19 Presented to ED after being found down by . Intubated. Trauma consulted. Neurosurgery consulted. Taken to OR for stat craniotomy for SDH. Admitted to NSICU postoperatively. Intubated  4/12/19 Hypotensive and hyperthermic overnight. On Levo. GCS 3. S/p craniotomy   4/13/19 Intravascular active temperature management catheter inserted yesterday for persistent hyperthermia. Decerebrate posturing. 4/13/19 Decerebrate posturing in all extremities    4/14 Still decerebrate posturing - intermittent Zoll for normothermia  4/15 3% discontinued  4/16 Zoll discontinued  4/17  called--he is awaiting until Friday CT scan to make decision on trach/PEG or extubation. He does not think she would want a trach/PEG; CSW--fludrocortisone started  4/19 [employer] following patient,  notified patient is Hep C positive    OVERNIGHT EVENTS:   Blood draws q4h now patient Hgb low      PHYSICAL EXAM:  CONSTITUTIONAL:  Intubated, no sedation  EYES:  Left pupil 4mm and right 4mm both nonreactive  LUNGS:  CTAB  CARDIOVASCULAR:  RRR  ABDOMEN:  Soft, NT, ND  MUSCULOSKELETAL:  No edema  NEUROLOGIC:  GCS 4T, + cough + gag      PROBLEMS/PLANS:    NEUROLOGIC:  PROBLEMS:  1. TBI, severe  2. H/o psychiatric disorder  PLAN:  1. C/w AED  2. NSG following  3.  Home psychiatric meds resumed but at lower doses--wean weekly    SEDATION/ANALGESIA:  PRN analgesia  neurontin     CARDIOVASCULAR:  PROBLEMS:  1. HTN  PLAN:  1. hemodynamic monitoring - noninvasive -  continue  2. Home meds resumed    PULMONARY:  PROBLEMS:  1. acute respiratory failure -  hypoxemic  PLAN:  1. ABG  2. CXR  3. mechanical ventilation -  continue  4. hyperinflation therapy -  continue  5. bronchopulmonary hygiene -  continue  6. bronchodilators -  continue    RENAL/FLUID/ELECTROLYTE:  PROBLEMS:  1. Cerebral salt wasting  2. Acute hyponatremia--improved  PLAN:  1. barr catheter -  Continue, Continue barr catheter for managing strict I and Os in this critically ill patient. 2. c/w NaCl tabs  3. C/w fludrocortisone    GI/NUTRITION:  PROBLEMS:  1. malnutrition  2. dysphagia  PLAN:  1. enteral nutrition -  continue    ID:  PROBLEMS:  1. MSSA and Ecoli tracheitis  PLAN:  1. Ancef--completed    HEMATOLOGIC:  PROBLEMS:  1. acute blood loss anemia  PLAN:  1. CBC  2. Transfuse 1 unit PRBC    ENDOCRINE:  PROBLEMS:  1. No active issues   PLAN:  1. No active issues       Poor prognosis    PROPHYLAXIS:   Stress ulcer: Pepcid and carafate   VTE: SCDs, heparin subq      DISPOSITION:   Continue ICU      CC TIME:  I spent 31 min managing this patients critical issues which are a constant threat to life excluding time teaching and performing procedures.       Family planning for withdrawal of care--[employer] following      Agustin Adams MD  4/20/2019  2:39 PM

## 2019-04-21 NOTE — PLAN OF CARE
Problem: Risk for Impaired Skin Integrity  Goal: Tissue integrity - skin and mucous membranes  Description  Structural intactness and normal physiological function of skin and  mucous membranes.   Outcome: Met This Shift     Problem: Falls - Risk of:  Goal: Will remain free from falls  Description  Will remain free from falls  4/20/2019 2201 by Rohan Jones RN  Outcome: Met This Shift     Problem: Falls - Risk of:  Goal: Absence of physical injury  Description  Absence of physical injury  Outcome: Met This Shift     Problem: Pain:  Goal: Control of acute pain  Description  Control of acute pain  4/20/2019 2201 by Rohan Jones RN  Outcome: Met This Shift     Problem: Gas Exchange - Impaired:  Goal: Levels of oxygenation will improve  Description  Levels of oxygenation will improve  Outcome: Met This Shift

## 2019-04-21 NOTE — PROGRESS NOTES
[facility] Intensive [facility]  Critical Care  Daily Progress Note 4/21/2019    Date of Admission: 04/11/2019    CC:  Follow for SDH. HOSPITAL COURSE/OVERNIGHT EVENTS:    04/11  Presented to ED after being found down by . Intubated. Trauma consulted. Neurosurgery consulted. Taken to OR for stat craniotomy for SDH. Admitted to NSICU postoperatively. Intubated. 04/12   Had hypotension during night, vasopressors required. 04/13   Nicom applied. Fluid responsive. Levophed weaned off last evening. Zoll catheter inserted for cooling to normothermia.    04/14   Patient normothermic. Family meeting held with Dr. Chelsy Toledo.   04/15   Patient continues to require Zoll for normothermia, 2 doses of antihypertensives required, pupils unequal   04/16   Zoll paused, Vitals stable, repeat CT this AM, small BM this AM  04/17  Removed Zoll and TLC from right femoral site. 04/19  T max 99.9 F. Remains on ventilator. No new issues overnight. Family requested organ donation. Required fluid bolus for urine output. 04/20 No new issues. 04/21 No new issues overnight. To go to surgery for donation after cardiac death. PHYSICAL EXAM:    BP (!) 127/58   Pulse 73   Temp 99.7 °F (37.6 °C) (Core)   Resp 24   Ht 5' 6\" (1.676 m)   Wt 135 lb 4.8 oz (61.4 kg)   LMP 10/08/2011 (Approximate)   SpO2 100%   BMI 21.84 kg/m²     Intake/Output Summary (Last 24 hours) at 4/21/2019 0859  Last data filed at 4/21/2019 0700  Gross per 24 hour   Intake 1030 ml   Output 1572 ml   Net -542 ml     General appearance:  Comfortable. NEUROLOGIC:   RASS Score:  - 4  GCS:  5T  1 - Does not open eyes   3 - Flexor response (decorticate)  1 - Makes no noise     Corneal reflex present. Gag reflex absent  Cough reflex present.       Pupil size:  Left 3 mm  Right 4 mm  Pupil reaction: No    Wiggles fingers: Left No Right No  Hand grasp:   Left none     Right none  Wiggles toes: Left No    Right No  Plantar flexion: Left none    Right none  Left facial swelling & large ecchymosis. Left cheek edematous. Crani incision CDI. CONSTITUTIONAL: No acute distress, lying in hospital bed. CARDIOVASCULAR: S1 S2, regular rate, regular rhythm, no murmur/gallop/rub. Monitor: NSR  PULMONARY: Bilaterally clear. No rhonchi/rales/wheezes, no use of accessory muscles. Intubated. Mechanical ventilation:  VT:400 ml. Mode: AC. Rate:12 bpm.  FiO2: 40%. Peep 5 cm. . PF ratio: 322. Minimal secretions. RENAL: Owens to gravity, clear yellow urine. Fluid balance for previous 24 hours:  - 542 ml. ABDOMEN: Soft, nontender, nondistended, nontympanic, normal bowel sounds. OGT. NPO.    MUSCULOSKELETAL:  Decorticate posturing with pain in all extremities. Right hand swollen. SKIN/EXTREMITIES: No rashes/ecchymosis, no edema/clubbing, warm/dry, good capillary refill. LINES:   Right subclavian  Day 10. Right femoral arterial line. Day 2. Good curve. Recent Labs     04/20/19  0805 04/20/19  1650 04/21/19  0515   WBC 10.6 11.8* 11.0   HGB 6.8* 7.9* 7.9*   HCT 22.2* 25.1* 24.6*   MCV 92.1 90.0 88.8   * 523* 530*       Recent Labs     04/20/19  0630 04/20/19  0805 04/20/19  1650 04/21/19  0515    144 145 147*   K 3.8 3.8 3.8 3.8   CO2 27 28 27 26   PHOS 4.4 4.2  --  4.1   BUN 13 13 13 12   CREATININE 0.4* 0.5 0.4* 0.5     Recent Labs     04/19/19  1400 04/20/19  0015  04/20/19  0805 04/20/19  1650 04/21/19  0515   PROT 6.1* 5.7*   < > 5.5* 5.7* 5.6*   INR 1.1 1.2  --  1.2  --   --     < > = values in this interval not displayed. ASSESSMENT/PLAN:       Active Problems:    SDH (subdural hematoma) (HCC)    Acute respiratory failure (HCC)    Palliative care by specialist    Goals of care, counseling/discussion  Resolved Problems:    * No resolved hospital problems. *    Neuro:  AMS.  SDH. S/P Craniotomy for SDH removal. Hx of SDH due to fall, depression & chronic back pain - Monitor neuro status. Neurosurgery following.   Keppra for seizure prophylaxis. Lexapro. Gabapentin. Seroquel. CV: No acute issues - Monitor hemodynamics. BP goal systolic less than 639 mm Hg. PRN hydralzine & labetalol. Statin. Losartan. Pulm: Acute respiratory failure - Intubated. Mechanical ventilation:  VT:400 ml. Mode: AC. Rate:12 bpm.  FiO2: 40%. Peep 5 cm. .Monitor RR & SpO2. PRN Albuterol. Performist. .  Daily CXR & ABG. Pulmonary hygiene. GI: Inability to eat. BMI 21  -  Monitor bowel function. Gastric tube. Tube feedings:  Immune enhancing at 45 ml per hour with daily protein modular supplement. Zofran. Bowel regime. Renal:  Cerebral salt wasting - Monitor Na level, BUN & Cr. Monitor electrolytes & replace as needed. Monitor I & O. Owens. Florinef. ID: No acute issues - Monitor for sepsis. Endocrine: No acute issues - Monitor BS.   MSK:  Deconditioned. Hx of OA - ROM. Turn & reposition. PRAFOs. Monitor for skin breakdown. Heme: Acute blood loss anemia  - Monitor CBC. Transfuse 1 u PRBC. Bowel regime: Lactulose. Glycolax. Senna. Pain control/Sedation:  Tylenol-scheduled. DVT prophylaxis: SCDs. No Lovenox/heparin due to SDH    GI prophylaxis: Pepcid. TF. Mouth/Eye care:  Artificial tears. Peridex. Owens: Keep in place for critical care monitoring of fluid balance. Ancillary consults:    Neurosurgery  Palliative Care. Medicine. Patient/Family update:  FAmily in room. Code status:  Full    Disposition: To surgery.       Electronically signed by Lorenza Kay RN MSN APRN-NP [facility] NP  CCNS CCRN 4/21/2019 8:59 AM

## 2019-04-21 NOTE — PROGRESS NOTES
Patient given heparin per [facility] protocol at 10:05. After 5 minutes had passed, the patient was terminally extubated to room air at 10:11.  1 mg of Ativan, 4 mg of Morphine and robinal were given after extubation. Another 1 mg Ativan and 2 mg of Morphine were given at 10:35 for tachycardia and hyptension, which resolved. 2 mg of Morphine was given at 11:45, follow by 2 mg of Versed at 11:50. The allotted two hour time period passed without patient going into PEA or asystole, which ended at 12:11  She was transferred to hospice from OR.     Electronically signed by Joi Ybarra MD on 4/21/2019 at 12:28 PM

## 2019-04-22 NOTE — PROGRESS NOTES
Liaison Informational Visit Note      Referral received from [employer]      Patient Name: Kayden Torres   :  1960  MRN:  [id number]    Admit date:  2019      Hospital Admitting Physician:  Juan Carlos Wang MD   PCP:  Vish Mata MD      Primary Insurance: Payor: Michael Eben /  /  /    Karen Cleo:  unknown    Emergency Contact:      Contact/Relation:   /         Phone:       Contact/Relation:   /     Phone:     Advance Directive  Advance directives received No  Patient has NOT completed an advance directive   Discussed with: Family member  DPOA-HC Name-Relation:    Phone:       Terminal Diagnosis SDH      Current Hospital Problem List:   Patient Active Problem List   Diagnosis Code    Major depression, chronic F34.1    Chronic back pain M54.9, G89.29    COPD (chronic obstructive pulmonary disease) ([facility] J44.9    SDH (subdural hematoma) ([facility] S06.5X9A    Acute respiratory failure ([facility] J96.00    Palliative care by specialist Z51.5    Goals of care, counseling/discussion Z71.89    Encounter for palliative care Z51.5       Code Status Order: DNR-CC     Past Medical History:        Diagnosis Date    Asthma     Chronic back pain     Endometriosis     GERD (gastroesophageal reflux disease)     On  meds    Headache(784.0)     IBS (irritable bowel syndrome)     Migraine     Neck pain     Osteoarthritis      Past Surgical History:        Procedure Laterality Date    COLONOSCOPY      COLONOSCOPY      CRANIOTOMY Left 2019    FULL LEFT CRANIOTOMY FOR SUBDURAL HEMATOMA EVACUATION performed by Blanca White MD at [address] [place] [address]      ENDOMETRIAL ABLATION      FRACTURE SURGERY Left 2013    ORIF left wrist    MOUTH SURGERY      multiple    MOUTH SURGERY      ORGAN PROCUREMENT N/A 2019    ORGAN PROCUREMENT AFTER CARDIAC DEATH performed [facility] OR    SIGMOIDOSCOPY      WRIST FRACTURE SURGERY Left     metal plate for colles fracture       Allergies:  Latex    Family Goal: comfort care    Meeting held with  and sister in law    Discharge Plan:  Discharge Disposition; unknown at this time  Facility Name: N/A    Referral received. Chart reviewed. Visit made to unit. Update received from Álvaro Rogers, bedside RN. Met with  and sister in law at the bedside. Explained hospice philosophy and reviewed hospice benefit. Discussed hospice house for short term symptom management. Once symptoms are managed patient would be discharged to a routine level of care either home or ECF with hospice. Explained room and board would be private pay at the facility. Discussed roles and frequency visits of skilled nursing, personal care team, SW,  and non medical volunteers. Patient's  expressed that patient was supposed to be an organ donor however patient did not  within the time frame so now patient unable to donate her organs. Discussed symptom management for patient's labored breathing. Patient's  stated he agreed to receive hospice information, he's not sure that patient will need hospice care. Explained that this id the next step when patient's are no longer receiving any further aggressive treatment and are focusing on comfort care only.  asked about financial payment. He stated as long as patient remains in the hospital, [address] [place] [address] pays for her care. He stated he signed papers. Explained that since patient is unable to donate her organs, patient's insurance is likely to continue to pay for her hospital stay. Again discussed hospice house vs in patient hospital hospice care. Explained criteria needs such as pain, SOB, restless, agitation, N/V. Explained patient appears to have labored breathing and on a morphine infusion. Patient's  does not want to make a decision right now to elect hospice care.  He would like to check his insurance and discuss with his sister what will be the best option

## 2019-04-22 NOTE — PROGRESS NOTES
Palliative Medicine   Progress Note  Clinical Nurse Specialist      Hospital Day: 12    Recommendations:  1. Psychosocial support of patient and family: met with  and pt's sister;  Ailyn Harris @ [phone] ; mother Eli Olivares @ [phone]  2. Assist with goals of care: comfort only; hospice care  3. Hospice consulted- choiced; wants [facility]  4. Code status: [address]  5. Anticipatory guidance: focus on comfort at this time  6. Symptom Management:   Comfort meds ordered:    IV morphine 2 mg/hr drip placed with orders to titrate as needed   IV ativan 1 mg q 15 min for dyspnea/distress   Has robinul IV 0.2 mg q 4 hrs prn    Chief Complaint: AMS    HPI :  Migel Dhillon is a 60 y.o. female with significant past medical history of IBS, migraine, GERD, chronic back pain, endometriosis, OA who presented to the ED from home after being found unresponsive by her . Patient recently admitted to the [facility] ED then transfer to [facility] 4/3- 4/6, after a reported fall down stairs and a SDH 2.5 mm shift, facial fracture. She was then admitted to inpatient psych 4/6 - 4/10 due to suicidal ideation and was discharged home. The morning of the 11th, the patient's  came downstairs to find her unresponsive. She was admitted with worsening SDH with increasing shift. She had an emergent craniotomy 4/11/19 (left frontotemporoparietal). Pt was being followed by [facility] as DCD candidate but pt continued with heartbeat. The allotted two hour time period passed without patient going into PEA or asystole, which ended at 12:11 yesterday in OR. Palliative Medicine continues to follow to assist pt/family with establishing goals of care and complex decision making. Palliative Care Encounter:   Currently Ruba Farrar is laying in bed on oxygen per n/c 2L; SPO2 93%. She is unresponsive. She is breathing in the 30s' 40s at present.   Added IV morphine drip to be started at 2 mg /hr and increased ativan to 1 mg q 15 min for further comfort; has robinul for secretions. Extremities are warm; no mottling noted. Met with  Jorge Caruso; discussed hospice as next step; was choiced; he is in agreement with [facility]; orders placed. Met with pt's sister at bedside; updated/ support offered. Will follow along. Symptom Assessment:  Symptom Medications Number Doses in Past 24 hrs   Pain Tylenol po 650 mg q 4 hrs prn  Added Morphine IV 2 mg/hr drip added 0       Nausea/vomiting Zofran IV 4 mg q 6 hrs prn 0   Bowel Regime/constipation  Date of Last BM: none documented     Anxiety/agitation Ativan IV 1mg 15 min prn    SOB Robinul IV 0.2 mg q 4 hrs prn 3   Pruritus     Appetite     Sleep     Performance Status           Physical Exam:  Vitals:    /66   Pulse 126   Temp 100.3 °F (37.9 °C) (Temporal)   Resp (!) 48   Ht 5' 6\" (1.676 m)   Wt 135 lb 4.8 oz (61.4 kg)   LMP 10/08/2011 (Approximate)   SpO2 (!) 88%   BMI 21.84 kg/m²   · General Appearance: unresponsive; rapid breathing; no congestion  · Eyes: equal and round  · Neck: supple  · Lungs: CTA bilaterally  · Heart: ST; Regular rate and rhythm, S1 and S2 normal, no murmur, rub or gallop heard at this time. · Abdomen: Soft, non-tender, bowel sounds active all four quadrants  · Extremities:  Extremities warm; no mottling noted; Pulses equal bilaterally; pedal pulses 2+, no edema  · Skin: warm and dry  · Neurologic: unresponsive; not following commands       ALLERGIES:Latex      Impression:  Active Problems:    SDH (subdural hematoma) (HCC)    Acute respiratory failure (HCC)    Palliative care by specialist    Goals of care, counseling/discussion  Resolved Problems:    * No resolved hospital problems. *        Assessment :   La Nena Hussein a 60 y. o.female with fall and SDH; s/p crani; non responsive; went to OR for organ recovery for DCD but pt's did not go into asystole or PEA. She remains unresponsive with increased work of breathing at present.    is now in favor of hospice care. Code DNRCC. Comfort meds were added. Met with  and sister at [facility]. Spoke with [facility] and . Maricruz Gaona  MSN, APRN-CNS, [facility] 4/22/2019 11:37 AM    Time in minutes spent: 25 min    More than 50% of this interaction was related to counseling or information given r/t disease process; plan of care; and code status.     Discussed patient and the plan of care with the other IDT members of the Palliative Care Team as well as patient, family, and staff nurse.        Thank you for allowing us to work with you in the care of this patient.

## 2019-04-22 NOTE — CARE COORDINATION
Spoke to Amalia from [facility] and they now want a ref made to Hospice I spoke to the patients  and he chose [facility] of the [facility] ref made.  I will follow Thanks Katya-Williams

## 2019-04-22 NOTE — PROGRESS NOTES
LSW met with pts family to provide emotional support as they discuss and make plans to honor pts wishes for  end of life care, including hospice. Reassurance and reflective listening provided.

## 2019-04-22 NOTE — PROGRESS NOTES
Went into patient's room and patient was not breathing no vital signs present  Second nurse came into room and could not get any vital signs  Patient pronounced dead at [address] member in room
